# Patient Record
Sex: MALE | Race: WHITE | Employment: OTHER | URBAN - METROPOLITAN AREA
[De-identification: names, ages, dates, MRNs, and addresses within clinical notes are randomized per-mention and may not be internally consistent; named-entity substitution may affect disease eponyms.]

---

## 2021-12-17 ENCOUNTER — APPOINTMENT (OUTPATIENT)
Dept: GENERAL RADIOLOGY | Age: 46
DRG: 720 | End: 2021-12-17
Attending: EMERGENCY MEDICINE
Payer: MEDICAID

## 2021-12-17 ENCOUNTER — HOSPITAL ENCOUNTER (INPATIENT)
Age: 46
LOS: 24 days | Discharge: HOME HEALTH | DRG: 720 | End: 2022-01-10
Attending: STUDENT IN AN ORGANIZED HEALTH CARE EDUCATION/TRAINING PROGRAM | Admitting: HOSPITALIST
Payer: MEDICAID

## 2021-12-17 ENCOUNTER — APPOINTMENT (OUTPATIENT)
Dept: CT IMAGING | Age: 46
DRG: 720 | End: 2021-12-17
Attending: STUDENT IN AN ORGANIZED HEALTH CARE EDUCATION/TRAINING PROGRAM
Payer: MEDICAID

## 2021-12-17 DIAGNOSIS — R74.8 ELEVATED ALKALINE PHOSPHATASE LEVEL: ICD-10-CM

## 2021-12-17 DIAGNOSIS — J69.0 ASPIRATION PNEUMONIA, UNSPECIFIED ASPIRATION PNEUMONIA TYPE, UNSPECIFIED LATERALITY, UNSPECIFIED PART OF LUNG (HCC): ICD-10-CM

## 2021-12-17 DIAGNOSIS — A41.9 SEPSIS, DUE TO UNSPECIFIED ORGANISM, UNSPECIFIED WHETHER ACUTE ORGAN DYSFUNCTION PRESENT (HCC): Primary | ICD-10-CM

## 2021-12-17 DIAGNOSIS — M79.89 LEG SWELLING: ICD-10-CM

## 2021-12-17 DIAGNOSIS — R13.10 DYSPHAGIA, UNSPECIFIED TYPE: ICD-10-CM

## 2021-12-17 DIAGNOSIS — I82.412 DEEP VEIN THROMBOSIS (DVT) OF FEMORAL VEIN OF LEFT LOWER EXTREMITY, UNSPECIFIED CHRONICITY (HCC): ICD-10-CM

## 2021-12-17 DIAGNOSIS — J18.9 PNEUMONIA DUE TO INFECTIOUS ORGANISM, UNSPECIFIED LATERALITY, UNSPECIFIED PART OF LUNG: ICD-10-CM

## 2021-12-17 LAB
ALBUMIN SERPL-MCNC: 3.6 G/DL (ref 3.5–5)
ALBUMIN/GLOB SERPL: 0.8 {RATIO} (ref 1.1–2.2)
ALP SERPL-CCNC: 456 U/L (ref 45–117)
ALT SERPL-CCNC: 22 U/L (ref 12–78)
ANION GAP SERPL CALC-SCNC: 9 MMOL/L (ref 5–15)
APPEARANCE UR: CLEAR
AST SERPL W P-5'-P-CCNC: 28 U/L (ref 15–37)
ATRIAL RATE: 108 BPM
BACTERIA URNS QL MICRO: NEGATIVE /HPF
BASOPHILS # BLD: 0.1 K/UL (ref 0–0.1)
BASOPHILS NFR BLD: 1 % (ref 0–1)
BILIRUB SERPL-MCNC: 0.3 MG/DL (ref 0.2–1)
BILIRUB UR QL: NEGATIVE
BNP SERPL-MCNC: 31 PG/ML
BUN SERPL-MCNC: 11 MG/DL (ref 6–20)
BUN/CREAT SERPL: 13 (ref 12–20)
CA-I BLD-MCNC: 8.8 MG/DL (ref 8.5–10.1)
CALCULATED P AXIS, ECG09: 5 DEGREES
CALCULATED R AXIS, ECG10: 134 DEGREES
CALCULATED T AXIS, ECG11: 6 DEGREES
CHLORIDE SERPL-SCNC: 106 MMOL/L (ref 97–108)
CO2 SERPL-SCNC: 24 MMOL/L (ref 21–32)
COLOR UR: YELLOW
COVID-19 RAPID TEST, COVR: NOT DETECTED
CREAT SERPL-MCNC: 0.84 MG/DL (ref 0.7–1.3)
DIAGNOSIS, 93000: NORMAL
DIFFERENTIAL METHOD BLD: ABNORMAL
EOSINOPHIL # BLD: 0.2 K/UL (ref 0–0.4)
EOSINOPHIL NFR BLD: 2 % (ref 0–7)
ERYTHROCYTE [DISTWIDTH] IN BLOOD BY AUTOMATED COUNT: 13.7 % (ref 11.5–14.5)
GLOBULIN SER CALC-MCNC: 4.6 G/DL (ref 2–4)
GLUCOSE SERPL-MCNC: 104 MG/DL (ref 65–100)
GLUCOSE UR STRIP.AUTO-MCNC: NEGATIVE MG/DL
HCT VFR BLD AUTO: 39.7 % (ref 36.6–50.3)
HGB BLD-MCNC: 12.7 G/DL (ref 12.1–17)
HGB UR QL STRIP: NEGATIVE
IMM GRANULOCYTES # BLD AUTO: 0 K/UL (ref 0–0.04)
IMM GRANULOCYTES NFR BLD AUTO: 0 % (ref 0–0.5)
KETONES UR QL STRIP.AUTO: 80 MG/DL
LACTATE SERPL-SCNC: 1.8 MMOL/L (ref 0.4–2)
LEUKOCYTE ESTERASE UR QL STRIP.AUTO: NEGATIVE
LIPASE SERPL-CCNC: 138 U/L (ref 73–393)
LYMPHOCYTES # BLD: 0.9 K/UL (ref 0.8–3.5)
LYMPHOCYTES NFR BLD: 9 % (ref 12–49)
MCH RBC QN AUTO: 30.4 PG (ref 26–34)
MCHC RBC AUTO-ENTMCNC: 32 G/DL (ref 30–36.5)
MCV RBC AUTO: 95 FL (ref 80–99)
MONOCYTES # BLD: 0.8 K/UL (ref 0–1)
MONOCYTES NFR BLD: 7 % (ref 5–13)
MUCOUS THREADS URNS QL MICRO: ABNORMAL /LPF
NEUTS SEG # BLD: 8.3 K/UL (ref 1.8–8)
NEUTS SEG NFR BLD: 81 % (ref 32–75)
NITRITE UR QL STRIP.AUTO: NEGATIVE
NRBC # BLD: 0 K/UL (ref 0–0.01)
NRBC BLD-RTO: 0 PER 100 WBC
P-R INTERVAL, ECG05: 142 MS
PH UR STRIP: 7 [PH] (ref 5–8)
PLATELET # BLD AUTO: 351 K/UL (ref 150–400)
PMV BLD AUTO: 9.8 FL (ref 8.9–12.9)
POTASSIUM SERPL-SCNC: 3.9 MMOL/L (ref 3.5–5.1)
PROCALCITONIN SERPL-MCNC: <0.05 NG/ML
PROT SERPL-MCNC: 8.2 G/DL (ref 6.4–8.2)
PROT UR STRIP-MCNC: 100 MG/DL
Q-T INTERVAL, ECG07: 324 MS
QRS DURATION, ECG06: 88 MS
QTC CALCULATION (BEZET), ECG08: 434 MS
RBC # BLD AUTO: 4.18 M/UL (ref 4.1–5.7)
RBC #/AREA URNS HPF: ABNORMAL /HPF (ref 0–5)
SARS-COV-2, COV2: NORMAL
SODIUM SERPL-SCNC: 139 MMOL/L (ref 136–145)
SP GR UR REFRACTOMETRY: >1.03 (ref 1–1.03)
SPECIMEN SOURCE: NORMAL
TROPONIN-HIGH SENSITIVITY: 7 NG/L (ref 0–76)
UA: UC IF INDICATED,UAUC: ABNORMAL
UROBILINOGEN UR QL STRIP.AUTO: 4 EU/DL (ref 0.1–1)
VENTRICULAR RATE, ECG03: 108 BPM
WBC # BLD AUTO: 10.3 K/UL (ref 4.1–11.1)
WBC URNS QL MICRO: ABNORMAL /HPF (ref 0–4)

## 2021-12-17 PROCEDURE — 71045 X-RAY EXAM CHEST 1 VIEW: CPT

## 2021-12-17 PROCEDURE — 85025 COMPLETE CBC W/AUTO DIFF WBC: CPT

## 2021-12-17 PROCEDURE — 96375 TX/PRO/DX INJ NEW DRUG ADDON: CPT

## 2021-12-17 PROCEDURE — 65270000029 HC RM PRIVATE

## 2021-12-17 PROCEDURE — 74177 CT ABD & PELVIS W/CONTRAST: CPT

## 2021-12-17 PROCEDURE — 84484 ASSAY OF TROPONIN QUANT: CPT

## 2021-12-17 PROCEDURE — 74011000258 HC RX REV CODE- 258: Performed by: HOSPITALIST

## 2021-12-17 PROCEDURE — 74011250636 HC RX REV CODE- 250/636: Performed by: STUDENT IN AN ORGANIZED HEALTH CARE EDUCATION/TRAINING PROGRAM

## 2021-12-17 PROCEDURE — 99285 EMERGENCY DEPT VISIT HI MDM: CPT

## 2021-12-17 PROCEDURE — 96374 THER/PROPH/DIAG INJ IV PUSH: CPT

## 2021-12-17 PROCEDURE — 93005 ELECTROCARDIOGRAM TRACING: CPT

## 2021-12-17 PROCEDURE — 94761 N-INVAS EAR/PLS OXIMETRY MLT: CPT

## 2021-12-17 PROCEDURE — 74011000250 HC RX REV CODE- 250: Performed by: STUDENT IN AN ORGANIZED HEALTH CARE EDUCATION/TRAINING PROGRAM

## 2021-12-17 PROCEDURE — 81001 URINALYSIS AUTO W/SCOPE: CPT

## 2021-12-17 PROCEDURE — 84145 PROCALCITONIN (PCT): CPT

## 2021-12-17 PROCEDURE — 83880 ASSAY OF NATRIURETIC PEPTIDE: CPT

## 2021-12-17 PROCEDURE — 74018 RADEX ABDOMEN 1 VIEW: CPT

## 2021-12-17 PROCEDURE — 74011250636 HC RX REV CODE- 250/636: Performed by: EMERGENCY MEDICINE

## 2021-12-17 PROCEDURE — 83605 ASSAY OF LACTIC ACID: CPT

## 2021-12-17 PROCEDURE — 83690 ASSAY OF LIPASE: CPT

## 2021-12-17 PROCEDURE — 87635 SARS-COV-2 COVID-19 AMP PRB: CPT

## 2021-12-17 PROCEDURE — 36415 COLL VENOUS BLD VENIPUNCTURE: CPT

## 2021-12-17 PROCEDURE — 74011000636 HC RX REV CODE- 636: Performed by: STUDENT IN AN ORGANIZED HEALTH CARE EDUCATION/TRAINING PROGRAM

## 2021-12-17 PROCEDURE — 80053 COMPREHEN METABOLIC PANEL: CPT

## 2021-12-17 PROCEDURE — 74011250636 HC RX REV CODE- 250/636: Performed by: HOSPITALIST

## 2021-12-17 RX ORDER — PSYLLIUM HUSK 3.4 G
1 POWDER IN PACKET (EA) ORAL DAILY
COMMUNITY

## 2021-12-17 RX ORDER — ACETAMINOPHEN 325 MG/1
650 TABLET ORAL
Status: DISCONTINUED | OUTPATIENT
Start: 2021-12-17 | End: 2022-01-10 | Stop reason: HOSPADM

## 2021-12-17 RX ORDER — KETOTIFEN FUMARATE 0.35 MG/ML
1 SOLUTION/ DROPS OPHTHALMIC 2 TIMES DAILY
Status: DISCONTINUED | OUTPATIENT
Start: 2021-12-18 | End: 2022-01-10 | Stop reason: HOSPADM

## 2021-12-17 RX ORDER — FOLIC ACID 1 MG/1
1 TABLET ORAL DAILY
COMMUNITY

## 2021-12-17 RX ORDER — SODIUM CHLORIDE 0.9 % (FLUSH) 0.9 %
5-40 SYRINGE (ML) INJECTION EVERY 8 HOURS
Status: DISCONTINUED | OUTPATIENT
Start: 2021-12-17 | End: 2021-12-25

## 2021-12-17 RX ORDER — ENOXAPARIN SODIUM 100 MG/ML
40 INJECTION SUBCUTANEOUS EVERY 24 HOURS
Status: DISCONTINUED | OUTPATIENT
Start: 2021-12-17 | End: 2021-12-20

## 2021-12-17 RX ORDER — LACTULOSE 10 G/15ML
10 SOLUTION ORAL; RECTAL DAILY
COMMUNITY

## 2021-12-17 RX ORDER — SODIUM CHLORIDE 9 MG/ML
1000 INJECTION, SOLUTION INTRAVENOUS CONTINUOUS
Status: DISCONTINUED | OUTPATIENT
Start: 2021-12-17 | End: 2021-12-29

## 2021-12-17 RX ORDER — FOLIC ACID 1 MG/1
1 TABLET ORAL DAILY
Status: DISCONTINUED | OUTPATIENT
Start: 2021-12-18 | End: 2022-01-10 | Stop reason: HOSPADM

## 2021-12-17 RX ORDER — ALBUTEROL SULFATE 90 UG/1
2 AEROSOL, METERED RESPIRATORY (INHALATION)
Status: DISCONTINUED | OUTPATIENT
Start: 2021-12-17 | End: 2022-01-10 | Stop reason: HOSPADM

## 2021-12-17 RX ORDER — PHENYTOIN SODIUM 100 MG/1
230 CAPSULE, EXTENDED RELEASE ORAL DAILY
COMMUNITY

## 2021-12-17 RX ORDER — CETIRIZINE HCL 10 MG
10 TABLET ORAL DAILY
Status: DISCONTINUED | OUTPATIENT
Start: 2021-12-18 | End: 2022-01-10 | Stop reason: HOSPADM

## 2021-12-17 RX ORDER — LEVETIRACETAM 500 MG/1
1000 TABLET ORAL 2 TIMES DAILY
Status: DISCONTINUED | OUTPATIENT
Start: 2021-12-17 | End: 2021-12-18

## 2021-12-17 RX ORDER — LAMOTRIGINE 100 MG/1
300 TABLET ORAL 2 TIMES DAILY
Status: DISCONTINUED | OUTPATIENT
Start: 2021-12-18 | End: 2022-01-10 | Stop reason: HOSPADM

## 2021-12-17 RX ORDER — LEVETIRACETAM 1000 MG/1
1000 TABLET ORAL 2 TIMES DAILY
COMMUNITY

## 2021-12-17 RX ORDER — LAMOTRIGINE 150 MG/1
300 TABLET ORAL 2 TIMES DAILY
COMMUNITY

## 2021-12-17 RX ORDER — SODIUM CHLORIDE 9 MG/ML
125 INJECTION, SOLUTION INTRAVENOUS CONTINUOUS
Status: DISPENSED | OUTPATIENT
Start: 2021-12-17 | End: 2021-12-18

## 2021-12-17 RX ORDER — KETOTIFEN FUMARATE 0.35 MG/ML
1 SOLUTION/ DROPS OPHTHALMIC 2 TIMES DAILY
COMMUNITY

## 2021-12-17 RX ORDER — LORAZEPAM 2 MG/ML
1 INJECTION INTRAMUSCULAR
Status: COMPLETED | OUTPATIENT
Start: 2021-12-17 | End: 2021-12-17

## 2021-12-17 RX ORDER — CETIRIZINE HCL 10 MG
10 TABLET ORAL DAILY
COMMUNITY

## 2021-12-17 RX ORDER — SODIUM CHLORIDE 0.9 % (FLUSH) 0.9 %
5-40 SYRINGE (ML) INJECTION AS NEEDED
Status: DISCONTINUED | OUTPATIENT
Start: 2021-12-17 | End: 2022-01-01

## 2021-12-17 RX ORDER — ONDANSETRON 2 MG/ML
4 INJECTION INTRAMUSCULAR; INTRAVENOUS ONCE
Status: COMPLETED | OUTPATIENT
Start: 2021-12-17 | End: 2021-12-17

## 2021-12-17 RX ADMIN — SODIUM CHLORIDE 1000 ML: 9 INJECTION, SOLUTION INTRAVENOUS at 19:52

## 2021-12-17 RX ADMIN — PIPERACILLIN AND TAZOBACTAM 3.38 G: 3; .375 INJECTION, POWDER, LYOPHILIZED, FOR SOLUTION INTRAVENOUS at 21:32

## 2021-12-17 RX ADMIN — CEFTRIAXONE SODIUM 1 G: 1 INJECTION, POWDER, FOR SOLUTION INTRAMUSCULAR; INTRAVENOUS at 19:35

## 2021-12-17 RX ADMIN — SODIUM CHLORIDE 125 ML/HR: 9 INJECTION, SOLUTION INTRAVENOUS at 21:32

## 2021-12-17 RX ADMIN — IOPAMIDOL 100 ML: 755 INJECTION, SOLUTION INTRAVENOUS at 17:11

## 2021-12-17 RX ADMIN — Medication 10 ML: at 21:54

## 2021-12-17 RX ADMIN — ONDANSETRON 4 MG: 2 INJECTION INTRAMUSCULAR; INTRAVENOUS at 11:05

## 2021-12-17 RX ADMIN — SODIUM CHLORIDE 1000 ML: 9 INJECTION, SOLUTION INTRAVENOUS at 11:06

## 2021-12-17 RX ADMIN — ENOXAPARIN SODIUM 40 MG: 100 INJECTION SUBCUTANEOUS at 21:35

## 2021-12-17 RX ADMIN — LORAZEPAM 1 MG: 2 INJECTION INTRAMUSCULAR; INTRAVENOUS at 16:07

## 2021-12-17 RX ADMIN — AZITHROMYCIN MONOHYDRATE 500 MG: 500 INJECTION, POWDER, LYOPHILIZED, FOR SOLUTION INTRAVENOUS at 19:51

## 2021-12-17 RX ADMIN — SODIUM CHLORIDE 1000 ML: 9 INJECTION, SOLUTION INTRAVENOUS at 19:53

## 2021-12-17 NOTE — ED NOTES
Pt's  Maisha Davis [phone]] and care giver William Vogel [phone]] are his primary care givers. Cesilia Troncoso brought pt's VPS. Writer taped it to the head of bed, in a bag, to keep safe.

## 2021-12-17 NOTE — ED TRIAGE NOTES
Not eating and drinking x 4 days, also no bm x 4 days, per ems pt is \"basically at baseline\" but usually able to speak some, contracted

## 2021-12-17 NOTE — ED PROVIDER NOTES
EMERGENCY DEPARTMENT HISTORY AND PHYSICAL EXAM      Date: 12/17/2021  Patient Name: Jalil Rios      History of Presenting Illness     Chief Complaint   Patient presents with    Lethargy       History Provided By: Caregiver    HPI: Jalil Rios, 55 y.o. male with PMH of seizure disorder who comes from a group home with concerns for vomiting. Patient was transferred recently to this group home and they report over the last 4 days he has not been eating and drinking. He has not been having bowel movement this morning which he had a bowel movement after getting an enema. Reportedly per staff, patient vomited several times. Per staff, baseline function and is a few words and may follow simple commands. Has some contractures of extremities distally at baseline. There are no other complaints, changes, or physical findings at this time. PCP: Unknown, Provider, MD        Past History     Past Medical History:  Seizure disorder    Past Surgical History:  None    Family History:  No family history on file. Social History:  Social History     Tobacco Use    Smoking status: Not on file    Smokeless tobacco: Not on file   Substance Use Topics    Alcohol use: Not on file    Drug use: Not on file       Allergies:  No Known Allergies      Review of Systems     Review of Systems   Unable to perform ROS: Psychiatric disorder       Physical Exam     Physical Exam  Vitals and nursing note reviewed. Constitutional:       General: He is not in acute distress. Appearance: He is not toxic-appearing. HENT:      Head: Normocephalic and atraumatic. Nose: No congestion or rhinorrhea. Mouth/Throat:      Mouth: Mucous membranes are dry. Pharynx: Oropharynx is clear. Eyes:      Extraocular Movements: Extraocular movements intact. Conjunctiva/sclera: Conjunctivae normal.   Cardiovascular:      Rate and Rhythm: Regular rhythm. Tachycardia present.    Pulmonary:      Effort: Pulmonary effort is normal.      Breath sounds: Rhonchi present. Abdominal:      Palpations: Abdomen is soft. Tenderness: There is no abdominal tenderness. Skin:     General: Skin is warm and dry. Neurological:      Mental Status: He is alert. Mental status is at baseline. Lab and Diagnostic Study Results     Labs -     Recent Results (from the past 12 hour(s))   EKG, 12 LEAD, INITIAL    Collection Time: 12/17/21 10:55 AM   Result Value Ref Range    Ventricular Rate 108 BPM    Atrial Rate 108 BPM    P-R Interval 142 ms    QRS Duration 88 ms    Q-T Interval 324 ms    QTC Calculation (Bezet) 434 ms    Calculated P Axis 5 degrees    Calculated R Axis 134 degrees    Calculated T Axis 6 degrees    Diagnosis       Sinus tachycardia  Right axis deviation  Nonspecific anterior T wave changes  Abnormal ECG  No previous ECGs available  Confirmed by Sujatha Farley MD, Kathy Gaines ([id number]) on 12/17/2021 12:20:34 PM     CBC WITH AUTOMATED DIFF    Collection Time: 12/17/21 10:58 AM   Result Value Ref Range    WBC 10.3 4.1 - 11.1 K/uL    RBC 4.18 4.10 - 5.70 M/uL    HGB 12.7 12.1 - 17.0 g/dL    HCT 39.7 36.6 - 50.3 %    MCV 95.0 80.0 - 99.0 FL    MCH 30.4 26.0 - 34.0 PG    MCHC 32.0 30.0 - 36.5 g/dL    RDW 13.7 11.5 - 14.5 %    PLATELET 357 985 - 221 K/uL    MPV 9.8 8.9 - 12.9 FL    NRBC 0.0 0.0  WBC    ABSOLUTE NRBC 0.00 0.00 - 0.01 K/uL    NEUTROPHILS 81 (H) 32 - 75 %    LYMPHOCYTES 9 (L) 12 - 49 %    MONOCYTES 7 5 - 13 %    EOSINOPHILS 2 0 - 7 %    BASOPHILS 1 0 - 1 %    IMMATURE GRANULOCYTES 0 0 - 0.5 %    ABS. NEUTROPHILS 8.3 (H) 1.8 - 8.0 K/UL    ABS. LYMPHOCYTES 0.9 0.8 - 3.5 K/UL    ABS. MONOCYTES 0.8 0.0 - 1.0 K/UL    ABS. EOSINOPHILS 0.2 0.0 - 0.4 K/UL    ABS. BASOPHILS 0.1 0.0 - 0.1 K/UL    ABS. IMM.  GRANS. 0.0 0.00 - 0.04 K/UL    DF AUTOMATED     LACTIC ACID    Collection Time: 12/17/21 10:58 AM   Result Value Ref Range    Lactic acid 1.8 0.4 - 2.0 mmol/L   PROCALCITONIN    Collection Time: 12/17/21 11:30 AM   Result Value Ref Range    Procalcitonin <0.05 (H) 0 ng/mL   TROPONIN-HIGH SENSITIVITY    Collection Time: 12/17/21 11:30 AM   Result Value Ref Range    Troponin-High Sensitivity 7 0 - 76 ng/L   LIPASE    Collection Time: 12/17/21 12:10 PM   Result Value Ref Range    Lipase 138 73 - 105 U/L   METABOLIC PANEL, COMPREHENSIVE    Collection Time: 12/17/21 12:10 PM   Result Value Ref Range    Sodium 139 136 - 145 mmol/L    Potassium 3.9 3.5 - 5.1 mmol/L    Chloride 106 97 - 108 mmol/L    CO2 24 21 - 32 mmol/L    Anion gap 9 5 - 15 mmol/L    Glucose 104 (H) 65 - 100 mg/dL    BUN 11 6 - 20 mg/dL    Creatinine 0.84 0.70 - 1.30 mg/dL    BUN/Creatinine ratio 13 12 - 20      GFR est AA >60 >60 ml/min/1.73m2    GFR est non-AA >60 >60 ml/min/1.73m2    Calcium 8.8 8.5 - 10.1 mg/dL    Bilirubin, total 0.3 0.2 - 1.0 mg/dL    AST (SGOT) 28 15 - 37 U/L    ALT (SGPT) 22 12 - 78 U/L    Alk.  phosphatase 456 (H) 45 - 117 U/L    Protein, total 8.2 6.4 - 8.2 g/dL    Albumin 3.6 3.5 - 5.0 g/dL    Globulin 4.6 (H) 2.0 - 4.0 g/dL    A-G Ratio 0.8 (L) 1.1 - 2.2     NT-PRO BNP    Collection Time: 12/17/21 12:10 PM   Result Value Ref Range    NT pro-BNP 31 <125 pg/mL   URINALYSIS W/ REFLEX CULTURE    Collection Time: 12/17/21  3:18 PM    Specimen: Urine   Result Value Ref Range    Color Yellow      Appearance Clear Clear      Specific gravity >1.030 (H) 1.003 - 1.030    pH (UA) 7.0 5.0 - 8.0      Protein 100 (A) Negative mg/dL    Glucose Negative Negative mg/dL    Ketone 80 (A) Negative mg/dL    Bilirubin Negative Negative      Blood Negative Negative      Urobilinogen 4.0 (H) 0.1 - 1.0 EU/dL    Nitrites Negative Negative      Leukocyte Esterase Negative Negative      UA:UC IF INDICATED Culture not indicated by UA result Culture not indicated by UA result      WBC 0-4 0 - 4 /hpf    RBC 0-5 0 - 5 /hpf    Bacteria Negative Negative /hpf    Mucus Trace /lpf   SARS-COV-2    Collection Time: 12/17/21  4:20 PM   Result Value Ref Range    SARS-CoV-2 Please find results under separate order     COVID-19 RAPID TEST    Collection Time: 12/17/21  4:20 PM   Result Value Ref Range    Specimen source Nasopharyngeal      COVID-19 rapid test Not Detected Not Detected         Radiologic Studies -   [unfilled]  CT Results  (Last 48 hours)               12/17/21 1722  CT ABD PELV W CONT Final result    Impression:      1. Small focus of intraluminal gas within the urinary bladder. Please correlate   for recent instrumentation or other causes of intraluminal gas, including an   infectious process. 2. Somewhat reticular nodular opacities in the lung bases, suboptimally   evaluated due to respiratory motion. Some of these have what appears to be a   tree and bud morphology, suggesting either chronic aspiration or an infectious   or inflammatory small airways disease. Otherwise a fibrotic process should be   considered. This would be better characterized with dedicated chest CT as   clinically warranted. 3. Underdistention versus circumferential wall thickening of the distal   esophagus. 4. Probable left trochanteric bursitis with sterility of the fluid   indeterminate. 5. Probable myositis ossificans surrounding the left hip. Please correlate for   history of prior trauma. 6. Enlarged lymph nodes in the upper abdomen, nonspecific. Comparison with   outside imaging would be helpful, if available, to determine longer term   stability. Otherwise short interval follow-up in 3 months is recommended. Narrative:  Exam: CT ABD PELV W CONT       TECHNIQUE: Multiple transaxial CT images of the abdomen and pelvis were obtained   following the uneventful administration of 100 mL Isovue 370 intravenous   contrast. Coronal and sagittal reformatted images were provided.        Dose reduction: All CT scans at this facility are performed using dose reduction   optimization techniques as appropriate to a performed exam including the   following: Automated exposure control, adjustments of the mA and/or kV according   to patient size, or use of iterative reconstruction technique. COMPARISON: Chest and abdominal radiographs performed the same date       HISTORY: Abdominal pain       FINDINGS:       Lower thorax: The lung bases are somewhat suboptimally evaluated due to the   degree of respiratory motion. Within exam limitations there are reticular and   somewhat nodular opacities within the lung bases, some of which could have a   tree-in-bud morphology. Under distention versus wall thickening of the distal   esophagus. Abdomen and pelvis:   Liver: Subcentimeter well-circumscribed hypodensities are too small to further   characterize. Gallbladder: Unremarkable. Biliary system: Nondilated. Pancreas: Unremarkable. Spleen: Unremarkable. Adrenal glands: Normal.   Kidneys and ureters: The kidneys enhance symmetrically. There is a   well-circumscribed subcentimeter hypodensity in the lower pole of the left   kidney that is too small to further characterize. No hydronephrosis or   hydroureter. Urinary bladder: Decompressed, limiting further evaluation. There is a small   focus of intraluminal gas within the urinary bladder. Please correlate for   recent instrumentation or potentially infectious process. Reproductive organs: Unremarkable. Bowel: No findings of bowel obstruction. The appendix appears normal.       Peritoneum: No pneumoperitoneum. No free fluid. Lymph nodes: Prominent nonspecific lymph nodes adjacent to the esophageal hiatus   in the upper abdomen measuring up to 11 mm (axial series 201, image 47, for   example). Aorta and other vessels: The aorta is within normal limits. The systemic and   portal venous systems are opacified. Proximal splanchnic vessels appear patent. Bones: No acute or aggressive osseous abnormalities are evident. Superficial soft tissues:  There is a fluid collection lateral to the left hip   adjacent to the greater trochanter measuring up to approximately 4.7 x 1.6 x 9.5   cm extending cephalad into the gluteus musculature. It has peripheral   hyperdensity and could represent trochanteric bursitis with sterility   indeterminate. This is either a bilobed communicating fluid collection or 2   adjacent small fluid collections. Otherwise there is what appears to be myositis   ossificans surrounding the left hip. CXR Results  (Last 48 hours)               12/17/21 1150  XR CHEST SNGL V Final result    Impression:  Patchy opacities within the lungs, nonspecific, but leading differential   considerations include infection or pulmonary edema. Superimposed acute disease   on an underlying chronic interstitial process is also possible. Radiographic   follow-up is recommended to exclude other causes of opacification. Narrative:  Examination: XR CHEST SNGL V        History: weakness       Comparison: None available. FINDINGS:       Single frontal portable view of the chest. Somewhat suboptimal evaluation   related to patient positioning and portable technique. Lung volumes are   symmetric. Hazy and reticular opacities in the lungs, most pronounced in the   left mid and lower lung as well as the right lower lung. No radiographically   evident pneumothorax. Small layering effusion on the left could contribute to   the above described opacification. The cardiac silhouette is within normal   limits for technique. Generator pack overlies and obscures portions of the left   chest. No acute osseous abnormalities are evident. Please note that portions of   the lung apices are obscured by overlying structures. Gaseous distention of   bowel in the upper abdomen is better evaluated on the concurrently performed   abdominal radiograph. Medical Decision Making and ED Course   - I am the first and primary provider for this patient AND AM THE PRIMARY PROVIDER OF RECORD.     - I reviewed the vital signs, available nursing notes, past medical history, past surgical history, family history and social history. - Initial assessment performed. The patients presenting problems have been discussed, and the staff are in agreement with the care plan formulated and outlined with them. I have encouraged them to ask questions as they arise throughout their visit. Vital Signs-Reviewed the patient's vital signs. Patient Vitals for the past 24 hrs:   Temp Pulse Resp BP SpO2   12/17/21 1927  (!) 112 20 111/75 97 %   12/17/21 1846 100.2 °F (37.9 °C) (!) 114 25     12/17/21 1614 (!) 103.1 °F (39.5 °C)       12/17/21 1106     94 %   12/17/21 1043  (!) 105 16 103/68        Records Reviewed: Nursing Notes    Provider Notes (Medical Decision Making):     Patient is presenting from group home with decreased activity, decreased eating and couple of episodes of vomiting. In the ED, patient was noted to be tachycardic. His exam was showed mild rhonchi while possible transmitted upper airway sounds. Not be tachycardic and dry. Thus, it appears rehydrated and water. Did CBC, chemistry, urinalysis, and x-ray. Showed some concerns for pneumonia on x-ray. Given his vomiting, I did obtain CT which did not show an acute finding. Patient spiked a fever in the ED and became more tachycardic after IV fluids. Thus, will consider sepsis secondary to pneumonia patient will need to be admitted to the hospital for treatment of sepsis. Disposition     Disposition: Condition stable  Admitted to Floor Medical Floor the case was discussed with the admitting physician Dr. Zuluaga Falling    Admitted    Diagnosis     Clinical Impression:   1. Sepsis, due to unspecified organism, unspecified whether acute organ dysfunction present ([facility] [address]    2. Pneumonia due to infectious organism, unspecified laterality, unspecified part of lung        Attestations:     Jaya Rogel MD    Please note that this dictation was completed with [employer] computer voice recognition software. Quite often unanticipated grammatical, syntax, homophones, and other interpretive errors are inadvertently transcribed by the computer software. Please disregard these errors. Please excuse any errors that have escaped final proofreading. Thank you.

## 2021-12-18 LAB
ALBUMIN SERPL-MCNC: 2.6 G/DL (ref 3.5–5)
ALBUMIN/GLOB SERPL: 0.7 {RATIO} (ref 1.1–2.2)
ALP SERPL-CCNC: 306 U/L (ref 45–117)
ALT SERPL-CCNC: 18 U/L (ref 12–78)
ANION GAP SERPL CALC-SCNC: 6 MMOL/L (ref 5–15)
AST SERPL W P-5'-P-CCNC: 29 U/L (ref 15–37)
BASOPHILS # BLD: 0.1 K/UL (ref 0–0.1)
BASOPHILS NFR BLD: 1 % (ref 0–1)
BILIRUB SERPL-MCNC: 0.3 MG/DL (ref 0.2–1)
BUN SERPL-MCNC: 11 MG/DL (ref 6–20)
BUN/CREAT SERPL: 13 (ref 12–20)
CA-I BLD-MCNC: 7.9 MG/DL (ref 8.5–10.1)
CHLORIDE SERPL-SCNC: 109 MMOL/L (ref 97–108)
CO2 SERPL-SCNC: 25 MMOL/L (ref 21–32)
CREAT SERPL-MCNC: 0.83 MG/DL (ref 0.7–1.3)
DIFFERENTIAL METHOD BLD: ABNORMAL
EOSINOPHIL # BLD: 0.1 K/UL (ref 0–0.4)
EOSINOPHIL NFR BLD: 1 % (ref 0–7)
ERYTHROCYTE [DISTWIDTH] IN BLOOD BY AUTOMATED COUNT: 13.2 % (ref 11.5–14.5)
GLOBULIN SER CALC-MCNC: 3.6 G/DL (ref 2–4)
GLUCOSE BLD STRIP.AUTO-MCNC: 124 MG/DL (ref 65–117)
GLUCOSE SERPL-MCNC: 119 MG/DL (ref 65–100)
HCT VFR BLD AUTO: 33.8 % (ref 36.6–50.3)
HGB BLD-MCNC: 10.3 G/DL (ref 12.1–17)
IMM GRANULOCYTES # BLD AUTO: 0 K/UL (ref 0–0.04)
IMM GRANULOCYTES NFR BLD AUTO: 0 % (ref 0–0.5)
LYMPHOCYTES # BLD: 1.2 K/UL (ref 0.8–3.5)
LYMPHOCYTES NFR BLD: 11 % (ref 12–49)
MCH RBC QN AUTO: 30.2 PG (ref 26–34)
MCHC RBC AUTO-ENTMCNC: 30.5 G/DL (ref 30–36.5)
MCV RBC AUTO: 99.1 FL (ref 80–99)
MONOCYTES # BLD: 1.2 K/UL (ref 0–1)
MONOCYTES NFR BLD: 10 % (ref 5–13)
NEUTS SEG # BLD: 8.9 K/UL (ref 1.8–8)
NEUTS SEG NFR BLD: 77 % (ref 32–75)
NRBC # BLD: 0 K/UL (ref 0–0.01)
NRBC BLD-RTO: 0 PER 100 WBC
PERFORMED BY, TECHID: ABNORMAL
PLATELET # BLD AUTO: 265 K/UL (ref 150–400)
PMV BLD AUTO: 9.8 FL (ref 8.9–12.9)
POTASSIUM SERPL-SCNC: 3.5 MMOL/L (ref 3.5–5.1)
PROT SERPL-MCNC: 6.2 G/DL (ref 6.4–8.2)
RBC # BLD AUTO: 3.41 M/UL (ref 4.1–5.7)
SODIUM SERPL-SCNC: 140 MMOL/L (ref 136–145)
WBC # BLD AUTO: 11.5 K/UL (ref 4.1–11.1)

## 2021-12-18 PROCEDURE — 74011250637 HC RX REV CODE- 250/637: Performed by: HOSPITALIST

## 2021-12-18 PROCEDURE — 74011000250 HC RX REV CODE- 250: Performed by: HOSPITALIST

## 2021-12-18 PROCEDURE — 36415 COLL VENOUS BLD VENIPUNCTURE: CPT

## 2021-12-18 PROCEDURE — 74011250636 HC RX REV CODE- 250/636: Performed by: HOSPITALIST

## 2021-12-18 PROCEDURE — 74011250636 HC RX REV CODE- 250/636: Performed by: INTERNAL MEDICINE

## 2021-12-18 PROCEDURE — 77010033678 HC OXYGEN DAILY

## 2021-12-18 PROCEDURE — 80053 COMPREHEN METABOLIC PANEL: CPT

## 2021-12-18 PROCEDURE — 92526 ORAL FUNCTION THERAPY: CPT

## 2021-12-18 PROCEDURE — 99222 1ST HOSP IP/OBS MODERATE 55: CPT | Performed by: INTERNAL MEDICINE

## 2021-12-18 PROCEDURE — 82962 GLUCOSE BLOOD TEST: CPT

## 2021-12-18 PROCEDURE — 92610 EVALUATE SWALLOWING FUNCTION: CPT

## 2021-12-18 PROCEDURE — 65610000006 HC RM INTENSIVE CARE

## 2021-12-18 PROCEDURE — 74011000258 HC RX REV CODE- 258: Performed by: HOSPITALIST

## 2021-12-18 PROCEDURE — 87040 BLOOD CULTURE FOR BACTERIA: CPT

## 2021-12-18 PROCEDURE — 85025 COMPLETE CBC W/AUTO DIFF WBC: CPT

## 2021-12-18 RX ORDER — ONDANSETRON 2 MG/ML
4 INJECTION INTRAMUSCULAR; INTRAVENOUS
Status: DISCONTINUED | OUTPATIENT
Start: 2021-12-18 | End: 2022-01-10 | Stop reason: HOSPADM

## 2021-12-18 RX ORDER — PHENYTOIN 125 MG/5ML
100 SUSPENSION ORAL DAILY
Status: DISCONTINUED | OUTPATIENT
Start: 2021-12-18 | End: 2022-01-02

## 2021-12-18 RX ORDER — LEVETIRACETAM 10 MG/ML
1000 INJECTION INTRAVASCULAR 2 TIMES DAILY
Status: DISCONTINUED | OUTPATIENT
Start: 2021-12-18 | End: 2022-01-07

## 2021-12-18 RX ORDER — ACETAMINOPHEN 650 MG/1
650 SUPPOSITORY RECTAL
Status: DISCONTINUED | OUTPATIENT
Start: 2021-12-18 | End: 2022-01-10 | Stop reason: HOSPADM

## 2021-12-18 RX ORDER — SODIUM CHLORIDE, SODIUM LACTATE, POTASSIUM CHLORIDE, CALCIUM CHLORIDE 600; 310; 30; 20 MG/100ML; MG/100ML; MG/100ML; MG/100ML
125 INJECTION, SOLUTION INTRAVENOUS CONTINUOUS
Status: DISPENSED | OUTPATIENT
Start: 2021-12-18 | End: 2021-12-18

## 2021-12-18 RX ORDER — PHENYTOIN 125 MG/5ML
130 SUSPENSION ORAL
Status: DISCONTINUED | OUTPATIENT
Start: 2021-12-18 | End: 2022-01-10 | Stop reason: HOSPADM

## 2021-12-18 RX ORDER — NOREPINEPHRINE BITARTRATE/D5W 8 MG/250ML
.5-16 PLASTIC BAG, INJECTION (ML) INTRAVENOUS
Status: DISCONTINUED | OUTPATIENT
Start: 2021-12-18 | End: 2021-12-22

## 2021-12-18 RX ADMIN — PHENYTOIN 130 MG: 125 SUSPENSION ORAL at 21:18

## 2021-12-18 RX ADMIN — FOLIC ACID 1 MG: 1 TABLET ORAL at 08:35

## 2021-12-18 RX ADMIN — Medication 10 ML: at 22:00

## 2021-12-18 RX ADMIN — CETIRIZINE HYDROCHLORIDE 10 MG: 10 TABLET, FILM COATED ORAL at 08:57

## 2021-12-18 RX ADMIN — KETOTIFEN FUMARATE 1 DROP: 0.35 SOLUTION/ DROPS OPHTHALMIC at 10:58

## 2021-12-18 RX ADMIN — PIPERACILLIN AND TAZOBACTAM 3.38 G: 3; .375 INJECTION, POWDER, LYOPHILIZED, FOR SOLUTION INTRAVENOUS at 13:18

## 2021-12-18 RX ADMIN — PIPERACILLIN AND TAZOBACTAM 3.38 G: 3; .375 INJECTION, POWDER, LYOPHILIZED, FOR SOLUTION INTRAVENOUS at 21:14

## 2021-12-18 RX ADMIN — ONDANSETRON 4 MG: 2 INJECTION INTRAMUSCULAR; INTRAVENOUS at 01:59

## 2021-12-18 RX ADMIN — SODIUM CHLORIDE, POTASSIUM CHLORIDE, SODIUM LACTATE AND CALCIUM CHLORIDE 125 ML/HR: 600; 310; 30; 20 INJECTION, SOLUTION INTRAVENOUS at 02:07

## 2021-12-18 RX ADMIN — SODIUM CHLORIDE, POTASSIUM CHLORIDE, SODIUM LACTATE AND CALCIUM CHLORIDE 125 ML/HR: 600; 310; 30; 20 INJECTION, SOLUTION INTRAVENOUS at 06:26

## 2021-12-18 RX ADMIN — LAMOTRIGINE 300 MG: 100 TABLET ORAL at 08:35

## 2021-12-18 RX ADMIN — VANCOMYCIN HYDROCHLORIDE 1000 MG: 1 INJECTION, POWDER, LYOPHILIZED, FOR SOLUTION INTRAVENOUS at 23:44

## 2021-12-18 RX ADMIN — ACETAMINOPHEN 650 MG: 650 SUPPOSITORY RECTAL at 01:59

## 2021-12-18 RX ADMIN — ACETAMINOPHEN 650 MG: 650 SUPPOSITORY RECTAL at 15:45

## 2021-12-18 RX ADMIN — ENOXAPARIN SODIUM 40 MG: 100 INJECTION SUBCUTANEOUS at 21:30

## 2021-12-18 RX ADMIN — VANCOMYCIN HYDROCHLORIDE 1000 MG: 1 INJECTION, POWDER, LYOPHILIZED, FOR SOLUTION INTRAVENOUS at 11:09

## 2021-12-18 RX ADMIN — PHENYTOIN 100 MG: 125 SUSPENSION ORAL at 11:09

## 2021-12-18 RX ADMIN — PSYLLIUM HUSK 1 PACKET: 3.4 POWDER ORAL at 11:17

## 2021-12-18 RX ADMIN — PIPERACILLIN AND TAZOBACTAM 3.38 G: 3; .375 INJECTION, POWDER, LYOPHILIZED, FOR SOLUTION INTRAVENOUS at 06:26

## 2021-12-18 RX ADMIN — KETOTIFEN FUMARATE 1 DROP: 0.35 SOLUTION/ DROPS OPHTHALMIC at 21:13

## 2021-12-18 RX ADMIN — LAMOTRIGINE 300 MG: 100 TABLET ORAL at 21:20

## 2021-12-18 RX ADMIN — LEVETIRACETAM 1000 MG: 10 INJECTION, SOLUTION INTRAVENOUS at 08:51

## 2021-12-18 RX ADMIN — LEVETIRACETAM 1000 MG: 10 INJECTION, SOLUTION INTRAVENOUS at 21:09

## 2021-12-18 NOTE — CONSULTS
Infectious Disease Consult Note    Reason for Consult: Sepsis due to aspiration PNA   Date of Consultation: December 18, 2021  Date of Admission: 12/17/2021  Referring Physician: Hospitalist     HPI: 48-y.o WM, admitted with sepsis due to aspiration PNA for which ID has been consulted. He is a long-term resident of a group home, has underlying intellectual disability, quadriplegia w contracted ext and seizure d/o. Per caregivers he was not eating and drinking 4 days PTP. He is unable to provide any history, history obtained from chart documentations. He was noted to be febrile on assessment the ED with T max of 103.1, was also tachycardic and hypotensive. He is maintaining O2 sats on 3 L. COVID Ag test is neg. Admission CXR revealed patchy opacities in both. CT abdo/pel negative for an acute intra-abdominal process revealed probable left hip myositis and intra-abdominal adenopathy. UA is unremarkable,  Today's labs showed leukocytosis 11.5, up from 10.3 on initial labs, Procal <0.05, . He is on Zosyn monotherapy, s/p Azithromycin in the ED. Review of Systems: Unobtainable, decreased responsiveness     Past Medical History:  Past Medical History:   Diagnosis Date    Intellectual disability     Quadriplegia, unspecified (HCC)     Flexion contractures upper and lower extremities    Seizure disorder [facility])        Past surgical history   History reviewed. No pertinent surgical history. Social History   Social History     Tobacco Use    Smoking status: Never Smoker    Smokeless tobacco: Never Used   Substance Use Topics    Alcohol use: Never    Drug use: Never        Family history   Family History   Family history unknown: Yes          Allergies:  No Known Allergies      Medications:  No current facility-administered medications on file prior to encounter.      Current Outpatient Medications on File Prior to Encounter   Medication Sig Dispense Refill    cetirizine (ZYRTEC) 10 mg tablet Take 10 mg by mouth daily.  phenytoin ER (Dilantin) 100 mg ER capsule Take 230 mg by mouth daily. Schedule at noon      folic acid (FOLVITE) 1 mg tablet Take 1 mg by mouth daily.  ketotifen (ZADITOR) 0.025 % (0.035 %) ophthalmic solution Administer 1 Drop to both eyes two (2) times a day.  lactulose (CHRONULAC) 10 gram/15 mL solution Take 10 g by mouth daily.  lamoTRIgine (LaMICtal) 150 mg tablet Take 300 mg by mouth two (2) times a day.  levETIRAcetam 1,000 mg tablet Take 1,000 mg by mouth two (2) times a day.  psyllium husk-aspartame (Natural Fiber Supplemnt,asprt,) 3.4 gram pwpk packet Take 1 Packet by mouth daily.              Physical Exam:    Vitals:   Patient Vitals for the past 24 hrs:   Temp Pulse Resp BP SpO2   12/18/21 0700 99 °F (37.2 °C)       12/18/21 0610 99.4 °F (37.4 °C) 89 22 (!) 91/54 97 %   12/18/21 0538  (!) 110 21 93/62 96 %   12/18/21 0418 (!) 101.7 °F (38.7 °C)   101/60 94 %   12/18/21 0115 (!) 102.5 °F (39.2 °C) (!) 106 22 99/60 94 %   12/17/21 2217 99 °F (37.2 °C) (!) 105  106/61 94 %   12/17/21 2117     92 %   12/17/21 1927  (!) 112 20 111/75 97 %   12/17/21 1846 100.2 °F (37.9 °C) (!) 114 25     12/17/21 1614 (!) 103.1 °F (39.5 °C)       12/17/21 1106     94 %   12/17/21 1043  (!) 105 16 103/68    ·   · GEN: NAD, Blank stare   · HEENT: NCAT, PERRLA  · HEART: S1, S2+, RRR, No murmur   · Lungs: Decreased at the bases, no wheeze/rhonchi   · Abdomen: soft, ND, NT, +BS   · Genitourinary: no genital discharge, no bueno  · Extremities: Contracted exts   · Skin: no chronic wounds or ulcers       Labs:   Recent Labs     12/18/21  0503 12/17/21  1058   WBC 11.5* 10.3   HGB 10.3* 12.7   HCT 33.8* 39.7    351     Recent Labs     12/18/21  0503 12/17/21  1210   BUN 11 11   CREA 0.83 0.84       Microbiology Data: None     Imaging:   CXR 12/17: Patchy opacities within the lungs, nonspecific, but leading differential  considerations include infection or pulmonary edema. Superimposed acute disease  on an underlying chronic interstitial process is also possible. Radiographic  follow-up is recommended to exclude other causes of opacification. Assessment / Plan:     1. Sepsis due to aspiration PNA w b/l patchy infiltrates on CXR       Febrile, tachycardic and hypotensive on presentation, not on pressor support       Mild leukocytosis of 11.5, COVID Ag test neg, UA unremarkable       No chronic wounds or ulcers appreciated       Continue on Zosyn, Vanc added, check blood Cx       Routine labs in the morning     2. Poor oral intake for a few days prior to presentation       No acute process on CT abdo/pel, adenopathy noted, unclear significance       Benign abdominal exam     3. Elevated ALP: sig unclear, trending down on todays labs     4.  Intellectual disability, quadriplegia w contracted ext      Cara Up MD     12/18/2021

## 2021-12-18 NOTE — ED NOTES
Bedside report given to Song Gabriel RN. ER tech instructed to feed patient soft foods like apple sauce. Song Gabriel shown pt  s vms that is taped to bed. Reymundo Hay from [facility] also aware it is taped to bed. Care givers went home.

## 2021-12-18 NOTE — PROGRESS NOTES
Progress Note    Patient: Nancy Arias MRN: [id number]  SSN: [id number]    YOB: 1975  Age: 55 y.o. Sex: male      Admit Date: 12/17/2021    LOS: 1 day     Subjective:     55 y.o. male with flexion contractures and bedridden status requiring total care but with oral intake with soft diet, intellectual developmental delay, seizure disorder brought to the emergency room by the caregivers from the group home as patient was not eating and drinking for 4 days. He did not also have any bowel movement, was given enema with not much success. He started having vomiting, several times but unable to get more information from them. He is found with a temperature on arrival to the emergency room and hypertension tachycardia meeting sepsis criteria. Chest x-ray with suspected aspiration pneumonia, CT of the abdomen pelvis done was showing some nonspecific abnormalities but no significant intra-abdominal pathology. Spoke to mother and discussed plan with her    Review of Systems:  Unable to deteremine s/t mental status      Objective:     Vitals:    12/18/21 1000 12/18/21 1100 12/18/21 1200 12/18/21 1300   BP: 94/62 109/78 110/79 (!) 117/59   Pulse: 99 98 92 98   Resp: 20 17 22 25   Temp:  99.6 °F (37.6 °C)     SpO2: 93% 92% 98% 96%   Weight:       Height:            Intake and Output:  Current Shift: No intake/output data recorded. Last three shifts: No intake/output data recorded. Physical Exam:   General : Open eyes to the commands but unable to stay awake. Well-built,  HEENT : PERRLA, dry oral mucosa, atraumatic normocephalic, Normal ear and nose. Neck : Supple, no JVD, no masses noted, no carotid bruit. Lungs : Breath sounds with moderate air entry bilaterally, rhonchi noted, not using accessory muscles to breathe. CVS : Rhythm rate regular, S1+, S2+, tachycardia noted. No murmur or gallop. Abdomen : Soft, nontender, no  bowel sounds active.   Extremities : Upper extremities wrist flexion contractures, lower extremities difficult to move. .  Musculoskeletal : Increased muscle tone power with contracture abnormalities. Skin : Dry, poor skin turgor. no pathological rash. Lymphatic : No cervical lymphadenopathy. Neurological : Unable to evaluate   psychiatric :  unable to evaluate.         Lab/Data Review:  Recent Results (from the past 24 hour(s))   URINALYSIS W/ REFLEX CULTURE    Collection Time: 12/17/21  3:18 PM    Specimen: Urine   Result Value Ref Range    Color Yellow      Appearance Clear Clear      Specific gravity >1.030 (H) 1.003 - 1.030    pH (UA) 7.0 5.0 - 8.0      Protein 100 (A) Negative mg/dL    Glucose Negative Negative mg/dL    Ketone 80 (A) Negative mg/dL    Bilirubin Negative Negative      Blood Negative Negative      Urobilinogen 4.0 (H) 0.1 - 1.0 EU/dL    Nitrites Negative Negative      Leukocyte Esterase Negative Negative      UA:UC IF INDICATED Culture not indicated by UA result Culture not indicated by UA result      WBC 0-4 0 - 4 /hpf    RBC 0-5 0 - 5 /hpf    Bacteria Negative Negative /hpf    Mucus Trace /lpf   SARS-COV-2    Collection Time: 12/17/21  4:20 PM   Result Value Ref Range    SARS-CoV-2 Please find results under separate order     COVID-19 RAPID TEST    Collection Time: 12/17/21  4:20 PM   Result Value Ref Range    Specimen source Nasopharyngeal      COVID-19 rapid test Not Detected Not Detected     METABOLIC PANEL, COMPREHENSIVE    Collection Time: 12/18/21  5:03 AM   Result Value Ref Range    Sodium 140 136 - 145 mmol/L    Potassium 3.5 3.5 - 5.1 mmol/L    Chloride 109 (H) 97 - 108 mmol/L    CO2 25 21 - 32 mmol/L    Anion gap 6 5 - 15 mmol/L    Glucose 119 (H) 65 - 100 mg/dL    BUN 11 6 - 20 mg/dL    Creatinine 0.83 0.70 - 1.30 mg/dL    BUN/Creatinine ratio 13 12 - 20      GFR est AA >60 >60 ml/min/1.73m2    GFR est non-AA >60 >60 ml/min/1.73m2    Calcium 7.9 (L) 8.5 - 10.1 mg/dL    Bilirubin, total 0.3 0.2 - 1.0 mg/dL    AST (SGOT) 29 15 - 37 U/L ALT (SGPT) 18 12 - 78 U/L    Alk. phosphatase 306 (H) 45 - 117 U/L    Protein, total 6.2 (L) 6.4 - 8.2 g/dL    Albumin 2.6 (L) 3.5 - 5.0 g/dL    Globulin 3.6 2.0 - 4.0 g/dL    A-G Ratio 0.7 (L) 1.1 - 2.2     CBC WITH AUTOMATED DIFF    Collection Time: 12/18/21  5:03 AM   Result Value Ref Range    WBC 11.5 (H) 4.1 - 11.1 K/uL    RBC 3.41 (L) 4.10 - 5.70 M/uL    HGB 10.3 (L) 12.1 - 17.0 g/dL    HCT 33.8 (L) 36.6 - 50.3 %    MCV 99.1 (H) 80.0 - 99.0 FL    MCH 30.2 26.0 - 34.0 PG    MCHC 30.5 30.0 - 36.5 g/dL    RDW 13.2 11.5 - 14.5 %    PLATELET 509 222 - 896 K/uL    MPV 9.8 8.9 - 12.9 FL    NRBC 0.0 0.0  WBC    ABSOLUTE NRBC 0.00 0.00 - 0.01 K/uL    NEUTROPHILS 77 (H) 32 - 75 %    LYMPHOCYTES 11 (L) 12 - 49 %    MONOCYTES 10 5 - 13 %    EOSINOPHILS 1 0 - 7 %    BASOPHILS 1 0 - 1 %    IMMATURE GRANULOCYTES 0 0 - 0.5 %    ABS. NEUTROPHILS 8.9 (H) 1.8 - 8.0 K/UL    ABS. LYMPHOCYTES 1.2 0.8 - 3.5 K/UL    ABS. MONOCYTES 1.2 (H) 0.0 - 1.0 K/UL    ABS. EOSINOPHILS 0.1 0.0 - 0.4 K/UL    ABS. BASOPHILS 0.1 0.0 - 0.1 K/UL    ABS. IMM. GRANS. 0.0 0.00 - 0.04 K/UL    DF AUTOMATED           Assessment and plan:      (1) acute hypoxic respiratory failure: 3L NC.  Wean to keep saturation     (2) sepsis: IVF, vanc and zosyn    (3) Aspiration pneumonia : speech, follow speech recommendations    (4) seizure disorder: resume home dose, most liekly had seizure    (5) intellectual disability and quadriplegia: complicates all aspects ofc are    DVT ppx: lovenox     DISPO: will eventually go home once room air and seen by neuro    Spoke to mother    Signed By: Melba Delacruz MD     December 18, 2021

## 2021-12-18 NOTE — PROGRESS NOTES
Problem: Dysphagia (Adult)  Goal: *Acute Goals and Plan of Care (Insert Text)  Description: Speech Therapy Goals  Initiated 12/18/2021  -Patient will participate in modified barium swallow study within 5 day(s), if medically necessary. [ ] Not met  [ ]  MET   [ ] Progressing  [ ] Catherineclyde Tompkins  -Patient will tolerate clear liquid diet with nectar thick consistency without signs/symptoms of aspiration given minimal cues within 3 day(s). [ ] Not met  [ ]  MET   [ ] Progressing  [ ] Catherinezoraidaa Ruse  -Patient will demonstrate understanding of swallow safety precautions and aspiration precautions, diet recs with moderate cues within 5  day(s). [ ] Not met  [ ]  MET   [ ] Progressing  [ ] Discontinue   Outcome: Not Progressing Towards Goal    SPEECH [address] EVALUATION  Patient: Cole Richardson (49 y.o. male)  Date: 12/18/2021  Primary Diagnosis: Aspiration pneumonia ([facility] [address] [J69.0]        Precautions: Aspiration Precaution       ASSESSMENT :  Based on the objective data described below, the patient presents with mild oropharyngeal dysphagia. The patient was positioned upright, NC 2L, with noted contractures of the body. He is nonverbal, but alert and responsive to name and focusing attention to the SLP and food and liquid items. The nurse reported that the client recently moved from a [address] [place] [address] in [place], [place] to a new [address] [place] [address] in [address] this past Tuesday. Client's diet has been Easy to Chew (chopped) diet with extra gravy for moisture and thin liquids via straw. However, since he moved, staff reports the client has been refusing to eat. The patient noted to have full dentition. Due to the patients contractures, use of a straw is the best option to provide liquids. Breakfast was at bedside, but he refused to open his mouth. The SLP provided iced toothette within the cheeks and ice chips to stimulate the lips for possible PO intake/assist with opening mouth.  The SLP offerred ice chips, applesauce, ice water, and nectar orange juice. He refused to open his mouth for ice chips and applesauce, but accepted liquids via straw. Noted cough response from ice water. However, accepted 4oz nectar OJ via straw without difficulty. Good HLE via digital palpation. He maintained 96 O2. Unable to assess patient's ability to [facility] or manipulate a bolus. Nurse was present for a portion of the evaluation/treatment because she had to get blood from the patient for labwork. Patient will benefit from skilled intervention to address the above impairments. Patients rehabilitation potential is considered to be Good     PLAN :  Recommendations and Planned Interventions:  Downgrade diet to mildly thick clear liquid diet via straw. Staff to feed the client all meals. SLP to follow and trial upgraded PO textures. Frequency/Duration: Patient will be followed by speech-language pathology daily to address goals. Discharge Recommendations: To Be Determined     SUBJECTIVE:   Patient nonverbal, but alert and aware of surroundings. OBJECTIVE:     CXR Results  (Last 48 hours)                 12/17/21 1150  XR CHEST SNGL V Final result    Impression:  Patchy opacities within the lungs, nonspecific, but leading differential   considerations include infection or pulmonary edema. Superimposed acute disease   on an underlying chronic interstitial process is also possible. Radiographic   follow-up is recommended to exclude other causes of opacification. Narrative:  Examination: XR CHEST SNGL V        History: weakness       Comparison: None available. FINDINGS:       Single frontal portable view of the chest. Somewhat suboptimal evaluation   related to patient positioning and portable technique. Lung volumes are   symmetric. Hazy and reticular opacities in the lungs, most pronounced in the   left mid and lower lung as well as the right lower lung. No radiographically   evident pneumothorax. Small layering effusion on the left could contribute to   the above described opacification. The cardiac silhouette is within normal   limits for technique. Generator pack overlies and obscures portions of the left   chest. No acute osseous abnormalities are evident. Please note that portions of   the lung apices are obscured by overlying structures. Gaseous distention of   bowel in the upper abdomen is better evaluated on the concurrently performed   abdominal radiograph. CT Results  (Last 48 hours)                 12/17/21 1722  CT ABD PELV W CONT Final result    Impression:      1. Small focus of intraluminal gas within the urinary bladder. Please correlate   for recent instrumentation or other causes of intraluminal gas, including an   infectious process. 2. Somewhat reticular nodular opacities in the lung bases, suboptimally   evaluated due to respiratory motion. Some of these have what appears to be a   tree and bud morphology, suggesting either chronic aspiration or an infectious   or inflammatory small airways disease. Otherwise a fibrotic process should be   considered. This would be better characterized with dedicated chest CT as   clinically warranted. 3. Underdistention versus circumferential wall thickening of the distal   esophagus. 4. Probable left trochanteric bursitis with sterility of the fluid   indeterminate. 5. Probable myositis ossificans surrounding the left hip. Please correlate for   history of prior trauma. 6. Enlarged lymph nodes in the upper abdomen, nonspecific. Comparison with   outside imaging would be helpful, if available, to determine longer term   stability. Otherwise short interval follow-up in 3 months is recommended.        Narrative:  Exam: CT ABD PELV W CONT       TECHNIQUE: Multiple transaxial CT images of the abdomen and pelvis were obtained   following the uneventful administration of 100 mL Isovue 370 intravenous   contrast. Coronal and sagittal reformatted images were provided. Dose reduction: All CT scans at this facility are performed using dose reduction   optimization techniques as appropriate to a performed exam including the   following: Automated exposure control, adjustments of the mA and/or kV according   to patient size, or use of iterative reconstruction technique. COMPARISON: Chest and abdominal radiographs performed the same date       HISTORY: Abdominal pain       FINDINGS:       Lower thorax: The lung bases are somewhat suboptimally evaluated due to the   degree of respiratory motion. Within exam limitations there are reticular and   somewhat nodular opacities within the lung bases, some of which could have a   tree-in-bud morphology. Under distention versus wall thickening of the distal   esophagus. Abdomen and pelvis:   Liver: Subcentimeter well-circumscribed hypodensities are too small to further   characterize. Gallbladder: Unremarkable. Biliary system: Nondilated. Pancreas: Unremarkable. Spleen: Unremarkable. Adrenal glands: Normal.   Kidneys and ureters: The kidneys enhance symmetrically. There is a   well-circumscribed subcentimeter hypodensity in the lower pole of the left   kidney that is too small to further characterize. No hydronephrosis or   hydroureter. Urinary bladder: Decompressed, limiting further evaluation. There is a small   focus of intraluminal gas within the urinary bladder. Please correlate for   recent instrumentation or potentially infectious process. Reproductive organs: Unremarkable. Bowel: No findings of bowel obstruction. The appendix appears normal.       Peritoneum: No pneumoperitoneum. No free fluid. Lymph nodes: Prominent nonspecific lymph nodes adjacent to the esophageal hiatus   in the upper abdomen measuring up to 11 mm (axial series 201, image 47, for   example). Aorta and other vessels: The aorta is within normal limits.  The systemic and   portal venous systems are opacified. Proximal splanchnic vessels appear patent. Bones: No acute or aggressive osseous abnormalities are evident. Superficial soft tissues: There is a fluid collection lateral to the left hip   adjacent to the greater trochanter measuring up to approximately 4.7 x 1.6 x 9.5   cm extending cephalad into the gluteus musculature. It has peripheral   hyperdensity and could represent trochanteric bursitis with sterility   indeterminate. This is either a bilobed communicating fluid collection or 2   adjacent small fluid collections. Otherwise there is what appears to be myositis   ossificans surrounding the left hip. Past Medical History:   Diagnosis Date    Intellectual disability     Quadriplegia, unspecified (HCC)     Flexion contractures upper and lower extremities    Seizure disorder ([facility] [address]    History reviewed. No pertinent surgical history. Prior Level of Function/Home Situation: Dependent for all ADLs/IADLs  Home Situation  Home Environment: Group home  One/Two Story Residence: Other (Comment)  Living Alone: No  Support Systems: Adult Group Home,Parent(s)  Patient Expects to be Discharged to[de-identified] Group home  Current DME Used/Available at Home: Other (comment) (unknown)  Diet prior to admission: Easy to Chew/thin   Current Diet:  Mildly thick clear liquids   Cognitive and Communication Status:  Neurologic State: Alert  Orientation Level: Unable to verbalize  Cognition: No command following        Pain:  Pain Scale 1: Numeric (0 - 10)  Pain Intensity 1: 0       After treatment:   Patient left in no apparent distress in bed, Call bell within reach, and Nursing notified    COMMUNICATION/EDUCATION:   Patient was educated regarding purpose of SLP assessment, POC, diet recs and sw safety precautions. Patient demonstrated Vladislaven Kraft understanding as evidenced by facial expressions and nonverbal cues.     The patient's plan of care including recommendations, planned interventions, and recommended diet changes were discussed with: Registered nurse and Physician. Patient is unable to participate in goal setting and plan of care.     Thank you for this referral.  Olive Laboy M.S. CCC-SLP

## 2021-12-18 NOTE — PROGRESS NOTES
Pt noted tachycardic,febrile,increased oxygyen demand. S/s persist despite tx per MD orders to include,fluids,tylenol,abx. Message left for  ID consult Dr. Elyse Lockhart. Pt transferred to the ICU at this time. Report given to Kenyatta Cantu. Also pt's only belonging;VPS was given to receiving nurse. Failed attempt x2 to notify care takers. Unable to leave a VM.

## 2021-12-18 NOTE — CONSULTS
Neurology Consult Note    HPI  Mr. Horacio Otero is a 55 y.o.  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 Pressure review, patient lives in a group home where his caretakers reported that he had not been eating or drinking like he usually does for a few days and has not been having any bowel movements. He reportedly has also had some vomiting as well. In the ED, patient was febrile, hypertensive, tachycardic and met sepsis criteria for which IV antibiotics were started. Chest x-ray revealed likely aspiration pneumonia. Neurology consulted for encephalopathy and possible seizure activity that may have brought on patient's current condition. No seizure activity was witnessed at his group home where during his hospitalization however. Home AEDs: Levetiracetam 1000 BID, Phenytoin 100/130, Lamotrigine 300 BID    Patient lethargic but awakens with minor vocal stimulation. Patient does not produce speak but does moan and doesn't follow any central peripheral commands. Patient has flexion contractures in the bilateral upper extremities and bilateral lower extremities. Patient seems to have a VNS generator in the left upper chest.      IMPRESSION  Mr. Horacio Otero is a 55 y.o.  male with the above history presented with decreased PO intake, lack of bowel movements and vomiting. In the ED, patient was found to be septic with chest x-ray concerning for aspiration pneumonia. Patient was started on IV antibiotics and admitted. Neurology consulted for possible seizure activity that may have initially brought on all of his symptoms. No seizure activity was witnessed at his group home where during his hospitalization however. Etiology of patient's presentation may have indeed resulted from an unwitnessed seizure episode leading to aspiration pneumonia and subsequent sepsis.   However, given patient's medical history he likely has degree of intractable epilepsy so will hold off on changing his seizure medications at the current time. Will consider increasing seizure medications if patient is seen to have any breakthrough seizure activity on an inpatient basis. We will continue with home AEDs for now. Patient also has VNS in place. RECOMMENDATIONS        Possible Breakthrough Seizure  Intractable Epilepsy W/VNS In Place  Intellectual Disability  Quadriplegia  Associated: Sepsis d/t Aspiration PNA  -Q6Hr NeuroChecks  -Seizure Precautions  -Seizure Prophylaxis: Levetiracetam 1000 BID, Phenytoin 100/130, Lamotrigine 300 BID  --> We will consider adjusting AEDs if patient has witnessed seizures in the hospital  -STAT IV Lorazepam 2 mg with any clinical seizure activity lasting > 3 minutes and contact Neurology for further recommendations  -Management of metabolic/infectious derangements to referring teams       Review of Systems  Unable to fully ascertain d/t mental status    Physical/Neurological Exam  Cardiovascular: tachycardic at times  Pulmonary: On supplemental oxygen  Skin: warm and dry      Patient lethargic but awakens with minor vocal stimulation; does not follow any central peripheral commands  Patient moans in response to vocal and physical stimulation  Unable to test for language  Pupils react to light bilaterally; EOM Intact   Blink to threat inconsistent bilaterally  No facial droop   Motor: At least 2/5 throughout to pain stimulation  Flexion contractures throughout in the bilateral upper and lower extremities  No abnormal movements   Nor increased tone throughout    Past Medical History:   Diagnosis Date    Intellectual disability     Quadriplegia, unspecified (HCC)     Flexion contractures upper and lower extremities    Seizure disorder ([facility] [address]       History reviewed. No pertinent surgical history.   No Known Allergies  Family History   Family history unknown: Yes        Social Connections:  Frequency of Communication with Friends and Family: Not on file    Frequency of Social Gatherings with Friends and Family: Not on file    Attends Yazdanism Services: Not on file    Active Member of Clubs or Organizations: Not on file    Attends Club or Organization Meetings: Not on file    Marital Status: Not on file         Medications  Current Outpatient Medications   Medication Instructions    cetirizine (ZYRTEC) 10 mg, Oral, DAILY    folic acid (FOLVITE) 1 mg, Oral, DAILY    ketotifen (ZADITOR) 0.025 % (0.035 %) ophthalmic solution 1 Drop, Both Eyes, 2 TIMES DAILY    lactulose (CHRONULAC) 10 g, Oral, DAILY    lamoTRIgine (LAMICTAL) 300 mg, Oral, 2 TIMES DAILY    levETIRAcetam 1,000 mg, Oral, 2 TIMES DAILY    phenytoin ER (DILANTIN) 230 mg, Oral, DAILY, Schedule at noon     psyllium husk-aspartame (Natural Fiber Supplemnt,asprt,) 3.4 gram pwpk packet 1 Packet, Oral, DAILY       Current Facility-Administered Medications   Medication Dose Route Frequency    levETIRAcetam in saline (iso-os) (KEPPRA) infusion 1,000 mg  1,000 mg IntraVENous BID    acetaminophen (TYLENOL) suppository 650 mg  650 mg Rectal Q4H PRN    ondansetron (ZOFRAN) injection 4 mg  4 mg IntraVENous Q6H PRN    NOREPINephrine (LEVOPHED) 8 mg in 5% dextrose 250mL (32 mcg/mL) infusion  0.5-16 mcg/min IntraVENous TITRATE    Vancomycin - Pharmacy to Dose   Other Rx Dosing/Monitoring    vancomycin (VANCOCIN) 1,000 mg in 0.9% sodium chloride 250 mL (VIAL-MATE)  1,000 mg IntraVENous Q12H    phenytoin (DILANTIN) 100 mg/4 mL oral suspension 100 mg  100 mg Oral DAILY    phenytoin (DILANTIN) 100 mg/4 mL oral suspension 130 mg  130 mg Oral QHS    [START ON 12/20/2021] Vancomycin - Please draw trough prior to dose 12/20 @ 1100   Other ONCE    0.9% sodium chloride infusion 1,000 mL  1,000 mL IntraVENous CONTINUOUS    0.9% sodium chloride infusion 1,000 mL  1,000 mL IntraVENous CONTINUOUS    cetirizine (ZYRTEC) tablet 10 mg  10 mg Oral DAILY    folic acid (FOLVITE) tablet 1 mg  1 mg Oral DAILY    ketotifen (ZADITOR) 0.025 % (0.035 %) ophthalmic solution 1 Drop  1 Drop Both Eyes BID    lactulose (CHRONULAC) 10 gram/15 mL solution 15 mL  10 g Oral DAILY    lamoTRIgine (LaMICtal) tablet 300 mg  300 mg Oral BID    psyllium husk-aspartame (METAMUCIL FIBER) packet 1 Packet  1 Packet Oral DAILY    sodium chloride (NS) flush 5-40 mL  5-40 mL IntraVENous Q8H    sodium chloride (NS) flush 5-40 mL  5-40 mL IntraVENous PRN    acetaminophen (TYLENOL) tablet 650 mg  650 mg Oral Q4H PRN    albuterol (PROVENTIL HFA, VENTOLIN HFA, PROAIR HFA) inhaler 2 Puff  2 Puff Inhalation Q4H PRN    enoxaparin (LOVENOX) injection 40 mg  40 mg SubCUTAneous Q24H    piperacillin-tazobactam (ZOSYN) 3.375 g in 0.9% sodium chloride (MBP/ADV) 100 mL MBP  3.375 g IntraVENous Q8H        Objective  Temp:  [99 °F (37.2 °C)-103.1 °F (39.5 °C)]   Pulse (Heart Rate):  []   BP: ()/(50-79)   Resp Rate:  [16-25]   O2 Sat (%):  [90 %-98 %]   Weight:  [59 kg (130 lb)]     Intake/Output Summary (Last 24 hours) at 12/18/2021 1801  Last data filed at 12/18/2021 1530  Gross per 24 hour   Intake    Output 300 ml   Net -300 ml     Wt Readings from Last 3 Encounters:   12/17/21 59 kg (130 lb)        Labs  Recent Results (from the past 24 hour(s))   METABOLIC PANEL, COMPREHENSIVE    Collection Time: 12/18/21  5:03 AM   Result Value Ref Range    Sodium 140 136 - 145 mmol/L    Potassium 3.5 3.5 - 5.1 mmol/L    Chloride 109 (H) 97 - 108 mmol/L    CO2 25 21 - 32 mmol/L    Anion gap 6 5 - 15 mmol/L    Glucose 119 (H) 65 - 100 mg/dL    BUN 11 6 - 20 mg/dL    Creatinine 0.83 0.70 - 1.30 mg/dL    BUN/Creatinine ratio 13 12 - 20      GFR est AA >60 >60 ml/min/1.73m2    GFR est non-AA >60 >60 ml/min/1.73m2    Calcium 7.9 (L) 8.5 - 10.1 mg/dL    Bilirubin, total 0.3 0.2 - 1.0 mg/dL    AST (SGOT) 29 15 - 37 U/L    ALT (SGPT) 18 12 - 78 U/L    Alk.  phosphatase 306 (H) 45 - 117 U/L    Protein, total 6.2 (L) 6.4 - 8.2 g/dL    Albumin 2.6 (L) 3.5 - 5.0 g/dL    Globulin 3.6 2.0 - 4.0 g/dL    A-G Ratio 0.7 (L) 1.1 - 2.2     CBC WITH AUTOMATED DIFF    Collection Time: 12/18/21  5:03 AM   Result Value Ref Range    WBC 11.5 (H) 4.1 - 11.1 K/uL    RBC 3.41 (L) 4.10 - 5.70 M/uL    HGB 10.3 (L) 12.1 - 17.0 g/dL    HCT 33.8 (L) 36.6 - 50.3 %    MCV 99.1 (H) 80.0 - 99.0 FL    MCH 30.2 26.0 - 34.0 PG    MCHC 30.5 30.0 - 36.5 g/dL    RDW 13.2 11.5 - 14.5 %    PLATELET 098 348 - 489 K/uL    MPV 9.8 8.9 - 12.9 FL    NRBC 0.0 0.0  WBC    ABSOLUTE NRBC 0.00 0.00 - 0.01 K/uL    NEUTROPHILS 77 (H) 32 - 75 %    LYMPHOCYTES 11 (L) 12 - 49 %    MONOCYTES 10 5 - 13 %    EOSINOPHILS 1 0 - 7 %    BASOPHILS 1 0 - 1 %    IMMATURE GRANULOCYTES 0 0 - 0.5 %    ABS. NEUTROPHILS 8.9 (H) 1.8 - 8.0 K/UL    ABS. LYMPHOCYTES 1.2 0.8 - 3.5 K/UL    ABS. MONOCYTES 1.2 (H) 0.0 - 1.0 K/UL    ABS. EOSINOPHILS 0.1 0.0 - 0.4 K/UL    ABS. BASOPHILS 0.1 0.0 - 0.1 K/UL    ABS. IMM. GRANS. 0.0 0.00 - 0.04 K/UL    DF AUTOMATED            Significant Diagnostic Studies  All images independently visualized    CT ABD PELV W CONT   Final Result      1. Small focus of intraluminal gas within the urinary bladder. Please correlate   for recent instrumentation or other causes of intraluminal gas, including an   infectious process. 2. Somewhat reticular nodular opacities in the lung bases, suboptimally   evaluated due to respiratory motion. Some of these have what appears to be a   tree and bud morphology, suggesting either chronic aspiration or an infectious   or inflammatory small airways disease. Otherwise a fibrotic process should be   considered. This would be better characterized with dedicated chest CT as   clinically warranted. 3. Underdistention versus circumferential wall thickening of the distal   esophagus. 4. Probable left trochanteric bursitis with sterility of the fluid   indeterminate.    5. Probable myositis ossificans surrounding the left hip. Please correlate for   history of prior trauma. 6. Enlarged lymph nodes in the upper abdomen, nonspecific. Comparison with   outside imaging would be helpful, if available, to determine longer term   stability. Otherwise short interval follow-up in 3 months is recommended. XR CHEST SNGL V   Final Result   Patchy opacities within the lungs, nonspecific, but leading differential   considerations include infection or pulmonary edema. Superimposed acute disease   on an underlying chronic interstitial process is also possible. Radiographic   follow-up is recommended to exclude other causes of opacification. XR ABD (KUB)   Final Result   Mildly prominent gas-filled loops of bowel within the central abdomen with   overall nonobstructive bowel gas pattern. This document has been prepared by the Dragon voice recognition system, typographical errors may have occurred.  Attempts have been made to correct errors, however inadvertent errors may persist.

## 2021-12-18 NOTE — H&P
History and Physical              Subjective :   Chief Complaint : No oral intake for 4 days, Found with temperature and suspected aspiration pneumonia with work-up    Source of information : From the records, patient unable to provide any information. History of present illness:   55 y.o. male with flexion contractures and bedridden status requiring total care but with oral intake with soft diet, intellectual developmental delay, seizure disorder brought to the emergency room by the caregivers from the group home as patient was not eating and drinking for 4 days. He did not also have any bowel movement, was given enema with not much success. He started having vomiting, several times but unable to get more information from them. He is found with a temperature on arrival to the emergency room and hypertension tachycardia meeting sepsis criteria. Chest x-ray with suspected aspiration pneumonia, CT of the abdomen pelvis done was showing some nonspecific abnormalities but no significant intra-abdominal pathology. Past Medical History:   Diagnosis Date    Intellectual disability     Quadriplegia, unspecified (HCC)     Flexion contractures upper and lower extremities    Seizure disorder ([facility] [address]      History reviewed. No pertinent surgical history. Family History   Family history unknown: Yes      Social History     Tobacco Use    Smoking status: Never Smoker    Smokeless tobacco: Never Used   Substance Use Topics    Alcohol use: Never       Prior to Admission medications    Medication Sig Start Date End Date Taking? Authorizing Provider   cetirizine (ZYRTEC) 10 mg tablet Take 10 mg by mouth daily. Yes Provider, Historical   phenytoin ER (Dilantin) 100 mg ER capsule Take 230 mg by mouth daily. Schedule at noon   Yes Provider, Historical   folic acid (FOLVITE) 1 mg tablet Take 1 mg by mouth daily.    Yes Provider, Historical   ketotifen (ZADITOR) 0.025 % (0.035 %) ophthalmic solution Administer 1 Drop to both eyes two (2) times a day. Yes Provider, Historical   lactulose (CHRONULAC) 10 gram/15 mL solution Take 10 g by mouth daily. Yes Provider, Historical   lamoTRIgine (LaMICtal) 150 mg tablet Take 300 mg by mouth two (2) times a day. Yes Provider, Historical   levETIRAcetam 1,000 mg tablet Take 1,000 mg by mouth two (2) times a day. Yes Provider, Historical   psyllium husk-aspartame (Natural Fiber Supplemnt,asprt,) 3.4 gram pwpk packet Take 1 Packet by mouth daily. Yes Provider, Historical     No Known Allergies          Review of Systems: Unable to get any information from the patient      Vitals:     Patient Vitals for the past 12 hrs:   Temp Pulse Resp BP SpO2   12/17/21 1927  (!) 112 20 111/75 97 %   12/17/21 1846 100.2 °F (37.9 °C) (!) 114 25     12/17/21 1614 (!) 103.1 °F (39.5 °C)       12/17/21 1106     94 %   12/17/21 1043  (!) 105 16 103/68        Physical Exam:   General : Open eyes to the commands but unable to stay awake. Well-built,  HEENT : PERRLA, dry oral mucosa, atraumatic normocephalic, Normal ear and nose. Neck : Supple, no JVD, no masses noted, no carotid bruit. Lungs : Breath sounds with moderate air entry bilaterally, rhonchi noted, not using accessory muscles to breathe. CVS : Rhythm rate regular, S1+, S2+, tachycardia noted. No murmur or gallop. Abdomen : Soft, nontender, no  bowel sounds active. Extremities : Upper extremities wrist flexion contractures, lower extremities difficult to move. .  Musculoskeletal : Increased muscle tone power with contracture abnormalities. Skin : Dry, poor skin turgor. no pathological rash. Lymphatic : No cervical lymphadenopathy. Neurological : Unable to evaluate   psychiatric :  unable to evaluate.          Data Review:   Recent Results (from the past 24 hour(s))   EKG, 12 LEAD, INITIAL    Collection Time: 12/17/21 10:55 AM   Result Value Ref Range    Ventricular Rate 108 BPM    Atrial Rate 108 BPM    P-R Interval 142 ms QRS Duration 88 ms    Q-T Interval 324 ms    QTC Calculation (Bezet) 434 ms    Calculated P Axis 5 degrees    Calculated R Axis 134 degrees    Calculated T Axis 6 degrees    Diagnosis       Sinus tachycardia  Right axis deviation  Nonspecific anterior T wave changes  Abnormal ECG  No previous ECGs available  Confirmed by Eva Brooks MD, Slim Barry ([id number]) on 12/17/2021 12:20:34 PM     CBC WITH AUTOMATED DIFF    Collection Time: 12/17/21 10:58 AM   Result Value Ref Range    WBC 10.3 4.1 - 11.1 K/uL    RBC 4.18 4.10 - 5.70 M/uL    HGB 12.7 12.1 - 17.0 g/dL    HCT 39.7 36.6 - 50.3 %    MCV 95.0 80.0 - 99.0 FL    MCH 30.4 26.0 - 34.0 PG    MCHC 32.0 30.0 - 36.5 g/dL    RDW 13.7 11.5 - 14.5 %    PLATELET 879 643 - 663 K/uL    MPV 9.8 8.9 - 12.9 FL    NRBC 0.0 0.0  WBC    ABSOLUTE NRBC 0.00 0.00 - 0.01 K/uL    NEUTROPHILS 81 (H) 32 - 75 %    LYMPHOCYTES 9 (L) 12 - 49 %    MONOCYTES 7 5 - 13 %    EOSINOPHILS 2 0 - 7 %    BASOPHILS 1 0 - 1 %    IMMATURE GRANULOCYTES 0 0 - 0.5 %    ABS. NEUTROPHILS 8.3 (H) 1.8 - 8.0 K/UL    ABS. LYMPHOCYTES 0.9 0.8 - 3.5 K/UL    ABS. MONOCYTES 0.8 0.0 - 1.0 K/UL    ABS. EOSINOPHILS 0.2 0.0 - 0.4 K/UL    ABS. BASOPHILS 0.1 0.0 - 0.1 K/UL    ABS. IMM.  GRANS. 0.0 0.00 - 0.04 K/UL    DF AUTOMATED     LACTIC ACID    Collection Time: 12/17/21 10:58 AM   Result Value Ref Range    Lactic acid 1.8 0.4 - 2.0 mmol/L   PROCALCITONIN    Collection Time: 12/17/21 11:30 AM   Result Value Ref Range    Procalcitonin <0.05 (H) 0 ng/mL   TROPONIN-HIGH SENSITIVITY    Collection Time: 12/17/21 11:30 AM   Result Value Ref Range    Troponin-High Sensitivity 7 0 - 76 ng/L   LIPASE    Collection Time: 12/17/21 12:10 PM   Result Value Ref Range    Lipase 138 73 - 209 U/L   METABOLIC PANEL, COMPREHENSIVE    Collection Time: 12/17/21 12:10 PM   Result Value Ref Range    Sodium 139 136 - 145 mmol/L    Potassium 3.9 3.5 - 5.1 mmol/L    Chloride 106 97 - 108 mmol/L    CO2 24 21 - 32 mmol/L    Anion gap 9 5 - 15 mmol/L Glucose 104 (H) 65 - 100 mg/dL    BUN 11 6 - 20 mg/dL    Creatinine 0.84 0.70 - 1.30 mg/dL    BUN/Creatinine ratio 13 12 - 20      GFR est AA >60 >60 ml/min/1.73m2    GFR est non-AA >60 >60 ml/min/1.73m2    Calcium 8.8 8.5 - 10.1 mg/dL    Bilirubin, total 0.3 0.2 - 1.0 mg/dL    AST (SGOT) 28 15 - 37 U/L    ALT (SGPT) 22 12 - 78 U/L    Alk. phosphatase 456 (H) 45 - 117 U/L    Protein, total 8.2 6.4 - 8.2 g/dL    Albumin 3.6 3.5 - 5.0 g/dL    Globulin 4.6 (H) 2.0 - 4.0 g/dL    A-G Ratio 0.8 (L) 1.1 - 2.2     NT-PRO BNP    Collection Time: 12/17/21 12:10 PM   Result Value Ref Range    NT pro-BNP 31 <125 pg/mL   URINALYSIS W/ REFLEX CULTURE    Collection Time: 12/17/21  3:18 PM    Specimen: Urine   Result Value Ref Range    Color Yellow      Appearance Clear Clear      Specific gravity >1.030 (H) 1.003 - 1.030    pH (UA) 7.0 5.0 - 8.0      Protein 100 (A) Negative mg/dL    Glucose Negative Negative mg/dL    Ketone 80 (A) Negative mg/dL    Bilirubin Negative Negative      Blood Negative Negative      Urobilinogen 4.0 (H) 0.1 - 1.0 EU/dL    Nitrites Negative Negative      Leukocyte Esterase Negative Negative      UA:UC IF INDICATED Culture not indicated by UA result Culture not indicated by UA result      WBC 0-4 0 - 4 /hpf    RBC 0-5 0 - 5 /hpf    Bacteria Negative Negative /hpf    Mucus Trace /lpf   SARS-COV-2    Collection Time: 12/17/21  4:20 PM   Result Value Ref Range    SARS-CoV-2 Please find results under separate order     COVID-19 RAPID TEST    Collection Time: 12/17/21  4:20 PM   Result Value Ref Range    Specimen source Nasopharyngeal      COVID-19 rapid test Not Detected Not Detected         Radiologic Studies :   CT Results  (Last 48 hours)               12/17/21 1722  CT ABD PELV W CONT Final result    Impression:      1. Small focus of intraluminal gas within the urinary bladder. Please correlate   for recent instrumentation or other causes of intraluminal gas, including an   infectious process.    2. Somewhat reticular nodular opacities in the lung bases, suboptimally   evaluated due to respiratory motion. Some of these have what appears to be a   tree and bud morphology, suggesting either chronic aspiration or an infectious   or inflammatory small airways disease. Otherwise a fibrotic process should be   considered. This would be better characterized with dedicated chest CT as   clinically warranted. 3. Underdistention versus circumferential wall thickening of the distal   esophagus. 4. Probable left trochanteric bursitis with sterility of the fluid   indeterminate. 5. Probable myositis ossificans surrounding the left hip. Please correlate for   history of prior trauma. 6. Enlarged lymph nodes in the upper abdomen, nonspecific. Comparison with   outside imaging would be helpful, if available, to determine longer term   stability. Otherwise short interval follow-up in 3 months is recommended. Narrative:  Exam: CT ABD PELV W CONT       TECHNIQUE: Multiple transaxial CT images of the abdomen and pelvis were obtained   following the uneventful administration of 100 mL Isovue 370 intravenous   contrast. Coronal and sagittal reformatted images were provided. Dose reduction: All CT scans at this facility are performed using dose reduction   optimization techniques as appropriate to a performed exam including the   following: Automated exposure control, adjustments of the mA and/or kV according   to patient size, or use of iterative reconstruction technique. COMPARISON: Chest and abdominal radiographs performed the same date       HISTORY: Abdominal pain       FINDINGS:       Lower thorax: The lung bases are somewhat suboptimally evaluated due to the   degree of respiratory motion. Within exam limitations there are reticular and   somewhat nodular opacities within the lung bases, some of which could have a   tree-in-bud morphology.  Under distention versus wall thickening of the distal esophagus. Abdomen and pelvis:   Liver: Subcentimeter well-circumscribed hypodensities are too small to further   characterize. Gallbladder: Unremarkable. Biliary system: Nondilated. Pancreas: Unremarkable. Spleen: Unremarkable. Adrenal glands: Normal.   Kidneys and ureters: The kidneys enhance symmetrically. There is a   well-circumscribed subcentimeter hypodensity in the lower pole of the left   kidney that is too small to further characterize. No hydronephrosis or   hydroureter. Urinary bladder: Decompressed, limiting further evaluation. There is a small   focus of intraluminal gas within the urinary bladder. Please correlate for   recent instrumentation or potentially infectious process. Reproductive organs: Unremarkable. Bowel: No findings of bowel obstruction. The appendix appears normal.       Peritoneum: No pneumoperitoneum. No free fluid. Lymph nodes: Prominent nonspecific lymph nodes adjacent to the esophageal hiatus   in the upper abdomen measuring up to 11 mm (axial series 201, image 47, for   example). Aorta and other vessels: The aorta is within normal limits. The systemic and   portal venous systems are opacified. Proximal splanchnic vessels appear patent. Bones: No acute or aggressive osseous abnormalities are evident. Superficial soft tissues: There is a fluid collection lateral to the left hip   adjacent to the greater trochanter measuring up to approximately 4.7 x 1.6 x 9.5   cm extending cephalad into the gluteus musculature. It has peripheral   hyperdensity and could represent trochanteric bursitis with sterility   indeterminate. This is either a bilobed communicating fluid collection or 2   adjacent small fluid collections. Otherwise there is what appears to be myositis   ossificans surrounding the left hip.                CXR Results  (Last 48 hours)               12/17/21 1150  XR CHEST SNGL V Final result    Impression:  Patchy opacities within the lungs, nonspecific, but leading differential   considerations include infection or pulmonary edema. Superimposed acute disease   on an underlying chronic interstitial process is also possible. Radiographic   follow-up is recommended to exclude other causes of opacification. Narrative:  Examination: XR CHEST SNGL V        History: weakness       Comparison: None available. FINDINGS:       Single frontal portable view of the chest. Somewhat suboptimal evaluation   related to patient positioning and portable technique. Lung volumes are   symmetric. Hazy and reticular opacities in the lungs, most pronounced in the   left mid and lower lung as well as the right lower lung. No radiographically   evident pneumothorax. Small layering effusion on the left could contribute to   the above described opacification. The cardiac silhouette is within normal   limits for technique. Generator pack overlies and obscures portions of the left   chest. No acute osseous abnormalities are evident. Please note that portions of   the lung apices are obscured by overlying structures. Gaseous distention of   bowel in the upper abdomen is better evaluated on the concurrently performed   abdominal radiograph. Assessment and Plan :     Sepsis syndrome: Most likely source is pulmonary suspected aspiration pneumonia    Aspiration pneumonia: This patient needed a soft diet, with vomiting I am suspecting more of aspiration rather than community-acquired and patient is from institution. We will keep him on Zosyn and follow-up closely    Seizure disorder: On medications but unable to take by mouth at this time. What ever we can change to IV we will do that and monitor closely    Intellectual disability severe with developmental delay, patient at baseline follows simple commands sometimes and nonverbal status.     Quadriplegia secondary to muscular skeletal deformities needing total care    Admitted to medical telemetry, full CODE STATUS, home medications reviewed from the [facility]. Has power of  on the file, but no advance medical directives and patient cannot make decisions. As per the information he is a full [facility] STATUS from the [facility]. CC : Unknown, Provider, MD  Signed By: Jaime Johns MD     December 17, 2021      This dictation was done by dragon, computer voice recognition software. Often unanticipated grammatical, syntax, [place] phones and other interpretive errors are inadvertently transcribed. Please excuse errors that have escaped final proofreading.

## 2021-12-18 NOTE — PROGRESS NOTES
Day #1 of Vancomycin  Consult provided for this 55 y.o. male for indication of suspected aspiration pneumonia, sepsis.   Antibiotic regimen:  Vancomycin + Zosyn  Concomitant nephrotoxic drugs: NE  Frequency of BMP: Not scheduled    Recent Labs     21  0503 21  1210 21  1058   WBC 11.5*  --  10.3   CREA 0.83 0.84  --    BUN 11 11  --      Est CrCl: ~75-80 ml/min  Temp (24hrs), Av.7 °F (38.2 °C), Min:99 °F (37.2 °C), Max:103.1 °F (39.5 °C)    Cultures:   None    MRSA Swab: Pending    Target range: AUC/STANLEY 400-600    Assessment/Plan:   Febrile, tachycardic, hypotensive, tachypnea  Will initiate therapy at 1000 mg q12h for a predicted AUC of 534  Will check a trough prior to dose  @ 1100  Tentative antimicrobial stop date  (7 day duration)

## 2021-12-18 NOTE — PROGRESS NOTES
Reason for Admission:  Aspiration PNA                     RUR Score:      10%               Plan for utilizing home health:      From a group home, [address] [place] [address]    PCP: First and Last name:  Unknown, Provider, MD     Name of Practice:    Are you a current patient: Yes/No: Patient does not have PCP At this time   Approximate date of last visit:    Can you participate in a virtual visit with your PCP: no                    Current Advanced Directive/Advance Care Plan: Full Code      Healthcare Decision Maker: Mother, Cristal Barclay, [phone]  Click here to [address] including selection of the [address] Relationship (ie \"Primary\")                             Transition of Care Plan:                  Return to group home    CM spoke with patient's mother, Ms. Josue Altman, via phone to complete DCP assessment. Ms. Josue Altman explained that patient has recently moved to a group home in the area, [address] [place] [address], address on chart confirmed, from a group home in [place]. Ms. Josue Altman lives in [place] and wanted patient closer to her. Patient is total care and receives all his care from the group home. Patient currently does not have an established PCP since he recently moved to new group home this week, however Ms. Josue Altman states that there is an appointment for one, however she does not have the information at this time. CM requested group home contact, she stated that Ms. Priscila Davis or Ms. Andradelio Sada would be the best contact but she does not have their number available to her at this time however she would provide when able. CM continues to follow, will obtain [facility] information and update [facility] when able.

## 2021-12-19 LAB
GLUCOSE BLD STRIP.AUTO-MCNC: 111 MG/DL (ref 65–117)
GLUCOSE BLD STRIP.AUTO-MCNC: 119 MG/DL (ref 65–117)
GLUCOSE BLD STRIP.AUTO-MCNC: 126 MG/DL (ref 65–117)
GLUCOSE BLD STRIP.AUTO-MCNC: 86 MG/DL (ref 65–117)
GLUCOSE BLD STRIP.AUTO-MCNC: 98 MG/DL (ref 65–117)
PERFORMED BY, TECHID: ABNORMAL
PERFORMED BY, TECHID: ABNORMAL
PERFORMED BY, TECHID: NORMAL

## 2021-12-19 PROCEDURE — 77010033678 HC OXYGEN DAILY

## 2021-12-19 PROCEDURE — 65610000006 HC RM INTENSIVE CARE

## 2021-12-19 PROCEDURE — 82962 GLUCOSE BLOOD TEST: CPT

## 2021-12-19 PROCEDURE — 74011250637 HC RX REV CODE- 250/637: Performed by: HOSPITALIST

## 2021-12-19 PROCEDURE — 74011250636 HC RX REV CODE- 250/636: Performed by: HOSPITALIST

## 2021-12-19 PROCEDURE — 74011250636 HC RX REV CODE- 250/636: Performed by: INTERNAL MEDICINE

## 2021-12-19 PROCEDURE — 99232 SBSQ HOSP IP/OBS MODERATE 35: CPT | Performed by: INTERNAL MEDICINE

## 2021-12-19 PROCEDURE — 74011000250 HC RX REV CODE- 250: Performed by: HOSPITALIST

## 2021-12-19 PROCEDURE — 92526 ORAL FUNCTION THERAPY: CPT

## 2021-12-19 PROCEDURE — 74011000258 HC RX REV CODE- 258: Performed by: HOSPITALIST

## 2021-12-19 RX ORDER — SODIUM CHLORIDE 9 MG/ML
75 INJECTION, SOLUTION INTRAVENOUS CONTINUOUS
Status: DISCONTINUED | OUTPATIENT
Start: 2021-12-19 | End: 2022-01-07

## 2021-12-19 RX ADMIN — PIPERACILLIN AND TAZOBACTAM 3.38 G: 3; .375 INJECTION, POWDER, LYOPHILIZED, FOR SOLUTION INTRAVENOUS at 21:09

## 2021-12-19 RX ADMIN — LACTULOSE 15 ML: 10 SOLUTION ORAL at 11:18

## 2021-12-19 RX ADMIN — LEVETIRACETAM 1000 MG: 10 INJECTION, SOLUTION INTRAVENOUS at 09:46

## 2021-12-19 RX ADMIN — Medication 10 ML: at 13:47

## 2021-12-19 RX ADMIN — SODIUM CHLORIDE 1000 ML: 9 INJECTION, SOLUTION INTRAVENOUS at 08:55

## 2021-12-19 RX ADMIN — LAMOTRIGINE 300 MG: 100 TABLET ORAL at 21:56

## 2021-12-19 RX ADMIN — LEVETIRACETAM 1000 MG: 10 INJECTION, SOLUTION INTRAVENOUS at 21:16

## 2021-12-19 RX ADMIN — PIPERACILLIN AND TAZOBACTAM 3.38 G: 3; .375 INJECTION, POWDER, LYOPHILIZED, FOR SOLUTION INTRAVENOUS at 13:47

## 2021-12-19 RX ADMIN — SODIUM CHLORIDE 75 ML/HR: 9 INJECTION, SOLUTION INTRAVENOUS at 10:26

## 2021-12-19 RX ADMIN — ACETAMINOPHEN 650 MG: 325 TABLET ORAL at 21:55

## 2021-12-19 RX ADMIN — ACETAMINOPHEN 650 MG: 650 SUPPOSITORY RECTAL at 16:02

## 2021-12-19 RX ADMIN — Medication 5 ML: at 21:10

## 2021-12-19 RX ADMIN — KETOTIFEN FUMARATE 1 DROP: 0.35 SOLUTION/ DROPS OPHTHALMIC at 10:29

## 2021-12-19 RX ADMIN — Medication 6 MCG/MIN: at 08:54

## 2021-12-19 RX ADMIN — ONDANSETRON 4 MG: 2 INJECTION INTRAMUSCULAR; INTRAVENOUS at 21:17

## 2021-12-19 RX ADMIN — SODIUM CHLORIDE 75 ML/HR: 9 INJECTION, SOLUTION INTRAVENOUS at 21:10

## 2021-12-19 RX ADMIN — PSYLLIUM HUSK 1 PACKET: 3.4 POWDER ORAL at 09:51

## 2021-12-19 RX ADMIN — KETOTIFEN FUMARATE 1 DROP: 0.35 SOLUTION/ DROPS OPHTHALMIC at 21:54

## 2021-12-19 RX ADMIN — VANCOMYCIN HYDROCHLORIDE 1000 MG: 1 INJECTION, POWDER, LYOPHILIZED, FOR SOLUTION INTRAVENOUS at 10:29

## 2021-12-19 RX ADMIN — SODIUM CHLORIDE 500 ML: 9 INJECTION, SOLUTION INTRAVENOUS at 18:00

## 2021-12-19 RX ADMIN — CETIRIZINE HYDROCHLORIDE 10 MG: 10 TABLET, FILM COATED ORAL at 09:51

## 2021-12-19 RX ADMIN — FOLIC ACID 1 MG: 1 TABLET ORAL at 09:51

## 2021-12-19 RX ADMIN — ENOXAPARIN SODIUM 40 MG: 100 INJECTION SUBCUTANEOUS at 21:21

## 2021-12-19 RX ADMIN — LAMOTRIGINE 300 MG: 100 TABLET ORAL at 09:51

## 2021-12-19 RX ADMIN — PHENYTOIN 100 MG: 125 SUSPENSION ORAL at 09:51

## 2021-12-19 NOTE — PROGRESS NOTES
Progress Note    Patient: Eugene Cox MRN: [id number]  SSN: [id number]    YOB: 1975  Age: 55 y.o. Sex: male      Admit Date: 12/17/2021    LOS: 2 days     Subjective:     55 y.o. male with flexion contractures and bedridden status requiring total care but with oral intake with soft diet, intellectual developmental delay, seizure disorder brought to the emergency room by the caregivers from the group home as patient was not eating and drinking for 4 days. He did not also have any bowel movement, was given enema with not much success. He started having vomiting, several times but unable to get more information from them. He is found with a temperature on arrival to the emergency room and hypertension tachycardia meeting sepsis criteria. Chest x-ray with suspected aspiration pneumonia, CT of the abdomen pelvis done was showing some nonspecific abnormalities but no significant intra-abdominal pathology. Spoke to mother and discussed plan with her  Had a seizure episode      Review of Systems:  Unable to deteremine s/t mental status      Objective:     Vitals:    12/19/21 1100 12/19/21 1200 12/19/21 1300 12/19/21 1400   BP: 98/60 (!) 96/57 (!) 83/57 (!) 93/56   Pulse: 87 86 95 94   Resp:       Temp:  99 °F (37.2 °C)     SpO2:       Weight:       Height:            Intake and Output:  Current Shift: 12/19 0701 - 12/19 1900  In: 2214.5 [I.V.:2214.5]  Out: 1800 [Urine:1800]  Last three shifts: 12/17 1901 - 12/19 0700  In: 540 [P.O.:540]  Out: 300 [Urine:300]      Physical Exam:   General : Open eyes to the commands but unable to stay awake. Well-built,  HEENT : PERRLA, dry oral mucosa, atraumatic normocephalic, Normal ear and nose. Neck : Supple, no JVD, no masses noted, no carotid bruit. Lungs : Breath sounds with moderate air entry bilaterally, rhonchi noted, not using accessory muscles to breathe. CVS : Rhythm rate regular, S1+, S2+, tachycardia noted.   No murmur or gallop. Abdomen : Soft, nontender, no  bowel sounds active. Extremities : Upper extremities wrist flexion contractures, lower extremities difficult to move. .  Musculoskeletal : Increased muscle tone power with contracture abnormalities. Skin : Dry, poor skin turgor. no pathological rash. Lymphatic : No cervical lymphadenopathy. Neurological : Unable to evaluate   psychiatric :  unable to evaluate. Lab/Data Review:  Recent Results (from the past 24 hour(s))   GLUCOSE, POC    Collection Time: 12/18/21  9:40 PM   Result Value Ref Range    Glucose (POC) 124 (H) 65 - 117 mg/dL    Performed by [address] [place] [address], POC    Collection Time: 12/19/21  6:15 AM   Result Value Ref Range    Glucose (POC) 119 (H) 65 - 117 mg/dL    Performed by Hailee Braun [address], POC    Collection Time: 12/19/21  7:38 AM   Result Value Ref Range    Glucose (POC) 111 65 - 117 mg/dL    Performed by Jaylin Jones    GLUCOSE, POC    Collection Time: 12/19/21 11:18 AM   Result Value Ref Range    Glucose (POC) 98 65 - 117 mg/dL    Performed by Jaylin Jones          Assessment and plan:      (1) acute hypoxic respiratory failure: 3L NC.  Wean to keep saturation     (2) septic shock: on levophed, weaned : IVF, vanc and zosyn    (3) Aspiration pneumonia : speech, follow speech recommendations    (4) seizure disorder: resume home dose, most liekly had seizure, another epoisode while inpatient,.     (5) intellectual disability and quadriplegia: complicates all aspects ofc are    DVT ppx: lovenox     DISPO: will eventually go home once room air and seen by neuro    Spoke to mother    Signed By: Audi Mock MD     December 19, 2021

## 2021-12-19 NOTE — PROGRESS NOTES
Seizure noted at 1000 lasting 10-15  seconds, Keppra running at time of seizure, vitals stable, mother at bedside. 1010:  Resting comfortable in bed, responding to verbal stimulus, no distress, vitals stable.  No seizure activity noted

## 2021-12-19 NOTE — PROGRESS NOTES
Infectious Disease Progress Note               Subjective:   Stable, more alert, nonverbal, mother at bedside, concerned about leg swelling, notes a h/o DVT   Remains afebrile and off pressor support   Objective:   Physical Exam:     Visit Vitals  BP (!) 93/56   Pulse 92   Temp 99 °F (37.2 °C)   Resp 21   Ht 5' (1.524 m)   Wt 130 lb (59 kg)   SpO2 96%   BMI 25.39 kg/m²    O2 Flow Rate (L/min): 3 l/min O2 Device: Nasal cannula    Temp (24hrs), Av.6 °F (37.6 °C), Min:99 °F (37.2 °C), Max:100.2 °F (37.9 °C)     07 - 1900  In: 2214.5 [I.V.:2214.5]  Out: 1800 [Urine:1800]   1901 -  0700  In: 540 [P.O.:540]  Out: 300 [Urine:300]    General: NAD, alert, non-verbal   HEENT: ALEJO, Moist mucosa   Lungs: CTA b/l, decreased at the bases, no wheeze/rhonchi   Heart: S1S2+, RRR, no murmur  Abdo: Soft, NT, ND, +BS   Exts: Contracted exts, some leg swelling   Skin: No wounds, No rashes or lesions    Data Review:       Recent Days:  Recent Labs     21  0503 21  1058   WBC 11.5* 10.3   HGB 10.3* 12.7   HCT 33.8* 39.7    351     Recent Labs     21  0503 21  1210   BUN 11 11   CREA 0.83 0.84       No results found for: CRPQN, CRP, CRPM       Microbiology     Results     Procedure Component Value Units Date/Time    CULTURE, BLOOD [[id number]] Collected: 21 1100    Order Status: Completed Specimen: Blood Updated: 21 1338     Special Requests: No Special Requests        Culture result: No growth after 22 hours       MRSA SCREEN - PCR (NASAL) [[id number]]     Order Status: Sent     CULTURE, SPUTUM/BRONCH/OTH [[id number]]     Order Status: Sent Specimen: Sputum     COVID-19 RAPID TEST [[id number]] Collected: 21 1620    Order Status: Completed Specimen: Nasopharyngeal Updated: 21 1740     Specimen source Nasopharyngeal        COVID-19 rapid test Not Detected        Comment: Rapid Abbott ID Now   Rapid NAAT:  The specimen is NEGATIVE for SARS-CoV-2, the novel coronavirus associated with COVID-19. Negative results should be treated as presumptive and, if inconsistent with clinical signs and symptoms or necessary for patient management, should be tested with an alternative molecular assay. Negative results do not preclude SARS-CoV-2 infection and should not be used as the sole basis for patient management decisions. This test has been authorized by the FDA under   an Emergency Use Authorization (EUA) for use by authorized laboratories. Fact sheet for Healthcare Providers: ConventionUpdate.co.nz Fact sheet for Patients: ConventionUpdate.co.nz   Methodology: Isothermal Nucleic Acid Amplification                  Diagnostics   CXR Results  (Last 48 hours)    None             Assessment/Plan     1. Sepsis due to aspiration PNA w b/l patchy infiltrates on CXR       Afebrile, routine labs not done today       Blood Cx from 12/18 is pending       Continue on empiric Vanc and Zosyn       Routine labs and CXR in the morning      2. Poor oral intake for a few days PTP: Being followed by speech therapist      3. H/o seizure d/o, started at 10 months old per mother at bedside      No witnessed seizures since admit, anti-epileptics adjusted by neurology      4. Intellectual disability, quadriplegic w contracted ext    5.  B/l leg swelling, mother concerned, reports a h/o DVT, venous doppler ordered per request     Venkat Forte MD    12/19/2021

## 2021-12-19 NOTE — PROGRESS NOTES
Multiple attempts made to 5th floor to call report, unable to give report at this time, nurse not returning call and or no answer; Will continue to try.

## 2021-12-20 ENCOUNTER — APPOINTMENT (OUTPATIENT)
Dept: NON INVASIVE DIAGNOSTICS | Age: 46
DRG: 720 | End: 2021-12-20
Attending: INTERNAL MEDICINE
Payer: MEDICAID

## 2021-12-20 ENCOUNTER — APPOINTMENT (OUTPATIENT)
Dept: GENERAL RADIOLOGY | Age: 46
DRG: 720 | End: 2021-12-20
Attending: INTERNAL MEDICINE
Payer: MEDICAID

## 2021-12-20 LAB
BASOPHILS # BLD: 0 K/UL (ref 0–0.1)
BASOPHILS NFR BLD: 1 % (ref 0–1)
CRP SERPL-MCNC: 6.44 MG/DL (ref 0–0.6)
DATE LAST DOSE: ABNORMAL
DATE LAST DOSE: NORMAL
DIFFERENTIAL METHOD BLD: ABNORMAL
EOSINOPHIL # BLD: 0.6 K/UL (ref 0–0.4)
EOSINOPHIL NFR BLD: 9 % (ref 0–7)
ERYTHROCYTE [DISTWIDTH] IN BLOOD BY AUTOMATED COUNT: 12.8 % (ref 11.5–14.5)
GLUCOSE BLD STRIP.AUTO-MCNC: 112 MG/DL (ref 65–117)
GLUCOSE BLD STRIP.AUTO-MCNC: 86 MG/DL (ref 65–117)
GLUCOSE BLD STRIP.AUTO-MCNC: 93 MG/DL (ref 65–117)
HCT VFR BLD AUTO: 31.1 % (ref 36.6–50.3)
HGB BLD-MCNC: 10 G/DL (ref 12.1–17)
IMM GRANULOCYTES # BLD AUTO: 0 K/UL (ref 0–0.04)
IMM GRANULOCYTES NFR BLD AUTO: 0 % (ref 0–0.5)
LYMPHOCYTES # BLD: 1.5 K/UL (ref 0.8–3.5)
LYMPHOCYTES NFR BLD: 22 % (ref 12–49)
MCH RBC QN AUTO: 31 PG (ref 26–34)
MCHC RBC AUTO-ENTMCNC: 32.2 G/DL (ref 30–36.5)
MCV RBC AUTO: 96.3 FL (ref 80–99)
MONOCYTES # BLD: 0.6 K/UL (ref 0–1)
MONOCYTES NFR BLD: 9 % (ref 5–13)
NEUTS SEG # BLD: 3.9 K/UL (ref 1.8–8)
NEUTS SEG NFR BLD: 59 % (ref 32–75)
NRBC # BLD: 0 K/UL (ref 0–0.01)
NRBC BLD-RTO: 0 PER 100 WBC
PERFORMED BY, TECHID: NORMAL
PHENYTOIN SERPL-MCNC: 5.6 UG/ML (ref 10–20)
PLATELET # BLD AUTO: 265 K/UL (ref 150–400)
PMV BLD AUTO: 9.6 FL (ref 8.9–12.9)
RBC # BLD AUTO: 3.23 M/UL (ref 4.1–5.7)
REPORTED DOSE,DOSE: ABNORMAL UNITS
REPORTED DOSE,DOSE: NORMAL UNITS
VANCOMYCIN SERPL-MCNC: 11 UG/ML
WBC # BLD AUTO: 6.6 K/UL (ref 4.1–11.1)

## 2021-12-20 PROCEDURE — 74011250636 HC RX REV CODE- 250/636: Performed by: STUDENT IN AN ORGANIZED HEALTH CARE EDUCATION/TRAINING PROGRAM

## 2021-12-20 PROCEDURE — 74011250637 HC RX REV CODE- 250/637: Performed by: HOSPITALIST

## 2021-12-20 PROCEDURE — 65270000029 HC RM PRIVATE

## 2021-12-20 PROCEDURE — 74011000258 HC RX REV CODE- 258: Performed by: HOSPITALIST

## 2021-12-20 PROCEDURE — 74011250636 HC RX REV CODE- 250/636: Performed by: HOSPITALIST

## 2021-12-20 PROCEDURE — 80202 ASSAY OF VANCOMYCIN: CPT

## 2021-12-20 PROCEDURE — 92526 ORAL FUNCTION THERAPY: CPT

## 2021-12-20 PROCEDURE — 85025 COMPLETE CBC W/AUTO DIFF WBC: CPT

## 2021-12-20 PROCEDURE — 86140 C-REACTIVE PROTEIN: CPT

## 2021-12-20 PROCEDURE — 82962 GLUCOSE BLOOD TEST: CPT

## 2021-12-20 PROCEDURE — 80185 ASSAY OF PHENYTOIN TOTAL: CPT

## 2021-12-20 PROCEDURE — 36415 COLL VENOUS BLD VENIPUNCTURE: CPT

## 2021-12-20 PROCEDURE — 93970 EXTREMITY STUDY: CPT

## 2021-12-20 PROCEDURE — 71045 X-RAY EXAM CHEST 1 VIEW: CPT

## 2021-12-20 PROCEDURE — 74011250636 HC RX REV CODE- 250/636: Performed by: INTERNAL MEDICINE

## 2021-12-20 PROCEDURE — 99232 SBSQ HOSP IP/OBS MODERATE 35: CPT | Performed by: INTERNAL MEDICINE

## 2021-12-20 RX ORDER — ENOXAPARIN SODIUM 100 MG/ML
60 INJECTION SUBCUTANEOUS EVERY 12 HOURS
Status: DISCONTINUED | OUTPATIENT
Start: 2021-12-20 | End: 2021-12-20

## 2021-12-20 RX ADMIN — PHENYTOIN 100 MG: 125 SUSPENSION ORAL at 10:46

## 2021-12-20 RX ADMIN — KETOTIFEN FUMARATE 1 DROP: 0.35 SOLUTION/ DROPS OPHTHALMIC at 10:47

## 2021-12-20 RX ADMIN — PHENYTOIN 130 MG: 125 SUSPENSION ORAL at 02:26

## 2021-12-20 RX ADMIN — LEVETIRACETAM 1000 MG: 10 INJECTION, SOLUTION INTRAVENOUS at 10:45

## 2021-12-20 RX ADMIN — KETOTIFEN FUMARATE 1 DROP: 0.35 SOLUTION/ DROPS OPHTHALMIC at 22:00

## 2021-12-20 RX ADMIN — APIXABAN 5 MG: 5 TABLET, FILM COATED ORAL at 21:55

## 2021-12-20 RX ADMIN — LEVETIRACETAM 1000 MG: 10 INJECTION, SOLUTION INTRAVENOUS at 21:56

## 2021-12-20 RX ADMIN — PIPERACILLIN AND TAZOBACTAM 3.38 G: 3; .375 INJECTION, POWDER, LYOPHILIZED, FOR SOLUTION INTRAVENOUS at 21:57

## 2021-12-20 RX ADMIN — SODIUM CHLORIDE 1000 ML: 9 INJECTION, SOLUTION INTRAVENOUS at 17:00

## 2021-12-20 RX ADMIN — Medication 10 ML: at 22:01

## 2021-12-20 RX ADMIN — VANCOMYCIN HYDROCHLORIDE 1000 MG: 1 INJECTION, POWDER, LYOPHILIZED, FOR SOLUTION INTRAVENOUS at 02:13

## 2021-12-20 RX ADMIN — LAMOTRIGINE 300 MG: 100 TABLET ORAL at 21:56

## 2021-12-20 RX ADMIN — SODIUM CHLORIDE 1000 ML: 9 INJECTION, SOLUTION INTRAVENOUS at 16:52

## 2021-12-20 RX ADMIN — PIPERACILLIN AND TAZOBACTAM 3.38 G: 3; .375 INJECTION, POWDER, LYOPHILIZED, FOR SOLUTION INTRAVENOUS at 05:03

## 2021-12-20 RX ADMIN — PIPERACILLIN AND TAZOBACTAM 3.38 G: 3; .375 INJECTION, POWDER, LYOPHILIZED, FOR SOLUTION INTRAVENOUS at 15:03

## 2021-12-20 RX ADMIN — Medication 10 ML: at 05:11

## 2021-12-20 RX ADMIN — Medication 10 ML: at 15:03

## 2021-12-20 RX ADMIN — VANCOMYCIN HYDROCHLORIDE 1000 MG: 1 INJECTION, POWDER, LYOPHILIZED, FOR SOLUTION INTRAVENOUS at 12:32

## 2021-12-20 RX ADMIN — PHENYTOIN 130 MG: 125 SUSPENSION ORAL at 22:00

## 2021-12-20 RX ADMIN — ENOXAPARIN SODIUM 60 MG: 60 INJECTION SUBCUTANEOUS at 12:32

## 2021-12-20 RX ADMIN — LAMOTRIGINE 300 MG: 100 TABLET ORAL at 10:46

## 2021-12-20 NOTE — PROGRESS NOTES
Day # 3 of Vancomycin    Consult provided for this 55 y.o. male for indication of suspected aspiration pneumonia, sepsis.   Antibiotic regimen:  Vancomycin + Zosyn  Concomitant nephrotoxic drugs: NE  Frequency of BMP: Not scheduled    Recent Labs     21  0500 21  0503   WBC 6.6 11.5*   CREA  --  0.83   BUN  --  11     Est CrCl: ~75-80 ml/min  Temp (24hrs), Av.6 °F (37.6 °C), Min:98.7 °F (37.1 °C), Max:100.4 °F (38 °C)    Cultures:   None    MRSA Swab: Pending    Target range: AUC/STANLEY 400-600   Trough level today is at 11.0    Assessment/Plan:   Febrile, tachycardic, hypotensive, tachypnea  Will continue with same  therapy at 1000 mg q12h for a predicted AUC of 425  Will check a trough prior to dose  @ 1100  Tentative antimicrobial stop date  (7 day duration)

## 2021-12-20 NOTE — PROGRESS NOTES
Infectious Disease Progress Note               Subjective:   Pt family bedside, doing well, denies new complaints, remains off pressor support, no febrile episodes. His Doppler of left leg positive for DVT.  He remains in the ICU awaiting bed assignment for transfer to the floors   Objective:   Physical Exam:     Visit Vitals  BP (!) 89/58 (BP 1 Location: Left lower arm, BP Patient Position: At rest)   Pulse 86   Temp 99.2 °F (37.3 °C)   Resp 16   Ht 5' (1.524 m)   Wt 130 lb (59 kg)   SpO2 93%   BMI 25.39 kg/m²    O2 Flow Rate (L/min): 2 l/min O2 Device: Nasal cannula    Temp (24hrs), Av.6 °F (37.6 °C), Min:98.7 °F (37.1 °C), Max:100.4 °F (38 °C)    701 - 1900  In: -   Out: 2700 [Urine:2700]   1901 -  0700  In: 2659.7 [I.V.:2659.7]  Out: 3450 [[facility] Health[facility]:4753]    General: NAD, alert, non-verbal   HEENT: ALEJO, Moist mucosa, left sclera erythema   Lungs: CTA b/l, decreased at the bases, no wheeze/rhonchi   Heart: S1S2+, RRR, no murmur  Abdo: Soft, NT, ND, +BS   Exts: Contracted exts, some leg swelling   Skin: No wounds, No rashes or lesions    Data Review:       Recent Days:  Recent Labs     21  0500 21  0503   WBC 6.6 11.5*   HGB 10.0* 10.3*   HCT 31.1* 33.8*    265     Recent Labs     21  0503   BUN 11   CREA 0.83       Lab Results   Component Value Date/Time    C-Reactive protein 6.44 (H) 2021 05:00 AM          Microbiology     Results     Procedure Component Value Units Date/Time    CULTURE, BLOOD [[id number]] Collected: 21 1100    Order Status: Completed Specimen: Blood Updated: 21 09     Special Requests: No Special Requests        Culture result: No growth 2 days       MRSA SCREEN - PCR (NASAL) [[id number]]     Order Status: Sent     CULTURE, SPUTUM/BRONCH/OTH [[id number]] Collected: 21 2115    Order Status: Canceled Specimen: Sputum     COVID-19 RAPID TEST [[id number]] Collected: 21 1620    Order Status: Completed Specimen: Nasopharyngeal Updated: 12/17/21 2100     Specimen source Nasopharyngeal        COVID-19 rapid test Not Detected        Comment: Rapid Abbott ID Now   Rapid NAAT:  The specimen is NEGATIVE for SARS-CoV-2, the novel coronavirus associated with COVID-19. Negative results should be treated as presumptive and, if inconsistent with clinical signs and symptoms or necessary for patient management, should be tested with an alternative molecular assay. Negative results do not preclude SARS-CoV-2 infection and should not be used as the sole basis for patient management decisions. This test has been authorized by the FDA under   an Emergency Use Authorization (EUA) for use by authorized laboratories. Fact sheet for Healthcare Providers: ConventionTherapeutic Monitoring Servicesdate.co.nz Fact sheet for Patients: imagoo.co.nz   Methodology: Isothermal Nucleic Acid Amplification                  Diagnostics   CXR Results  (Last 48 hours)               12/20/21 0210  XR CHEST PORT Final result    Impression:  No significant interval change. Narrative:  Chest, frontal view, 12/20/2021       History: Aspiration pneumonia. Comparison: Including chest 2/17/2021. Findings: Electronic device at the left hemithorax has a lead extending to the   left thoracic inlet. The cardiac silhouette is stable. The lungs are   underexpanded. Diffuse reticular nodular opacities in the lungs are not   significantly changed. No pleural effusion or pneumothorax identified. Left   costophrenic angle is incompletely imaged. The osseous structures are stable. Assessment/Plan     1.  Sepsis due to aspiration PNA w b/l patchy infiltrates on CXR       Afebrile, maintaining O2 sats on 2L which can easily be weaned off       Unchanged Diffuse reticular nodular opacities in the lungs on CXR       WBC normalized on todays labs, CRP 6.44      Continue on Zosyn, will d/c Vanc since no isolation of MRSA from Cxs       Routine labs in the morning      2. Poor oral intake for a few days PTP: Being followed by speech therapist      3. H/o seizure d/o, started at 10 months old per mother at bedside      No witnessed seizures since admit, anti-epileptics adjusted by neurology      4. Intellectual disability, quadriplegic w contracted ext    5. Acute left leg DVT, primary, Dr Eros Ceron, notified, started on Eliquis       H/o VT per mother, but does not appear to have been on anticoagulation as outpt    6.  Left sclera erythema: Start on Ketotifen eye drop, will monitor for improvement     Neida Salamanca MD    12/20/2021

## 2021-12-20 NOTE — CONSULTS
Neurology Consult Note    HPI  Mr. Beata Nielsen is a 55 y.o.  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 Pressure review, patient lives in a group home where his caretakers reported that he had not been eating or drinking like he usually does for a few days and has not been having any bowel movements. He reportedly has also had some vomiting as well. In the ED, patient was febrile, hypertensive, tachycardic and met sepsis criteria for which IV antibiotics were started. Chest x-ray revealed likely aspiration pneumonia. Neurology consulted for encephalopathy and possible seizure activity that may have brought on patient's current condition. No seizure activity was witnessed at his group home where during his hospitalization however. Home AEDs: Levetiracetam 1000 BID, Phenytoin 100/130, Lamotrigine 300 BID    Patient lethargic but awakens with minor vocal stimulation. Patient does not produce speak but does moan and doesn't follow any central peripheral commands. Patient has flexion contractures in the bilateral upper extremities and bilateral lower extremities. Patient seems to have a VNS generator in the left upper chest.      IMPRESSION  Mr. Beata Nielsen is a 55 y.o.  male with the above history presented with decreased PO intake, lack of bowel movements and vomiting. In the ED, patient was found to be septic with chest x-ray concerning for aspiration pneumonia. Patient was started on IV antibiotics and admitted. Neurology consulted for possible seizure activity that may have initially brought on all of his symptoms. No seizure activity was witnessed at his group home where during his hospitalization however. Etiology of patient's presentation may have indeed resulted from an unwitnessed seizure episode leading to aspiration pneumonia and subsequent sepsis.   However, given patient's medical history he likely has degree of intractable epilepsy so will hold off on changing his seizure medications at the current time. Will consider increasing seizure medications if patient is seen to have any breakthrough seizure activity on an inpatient basis. We will continue with home AEDs for now. Patient also has VNS in place. RECOMMENDATIONS        Possible Breakthrough Seizure  Intractable Epilepsy W/VNS In Place  Intellectual Disability  Quadriplegia  Associated: Sepsis d/t Aspiration PNA  -Q6Hr NeuroChecks  -Seizure Precautions  -Seizure Prophylaxis: Levetiracetam 1000 BID, Phenytoin 100/130, Lamotrigine 300 BID  -- will check phenytoin levels  --> We will consider adjusting AEDs if patient has witnessed seizures in the hospital  -STAT IV Lorazepam 2 mg with any clinical seizure activity lasting > 3 minutes and contact Neurology for further recommendations  -Management of metabolic/infectious derangements to referring teams    Please call back with questions. Review of Systems  Unable to fully ascertain d/t mental status    Physical/Neurological Exam  Cardiovascular: tachycardic at times  Pulmonary: On supplemental oxygen  Skin: warm and dry      Patient lethargic but awakens with minor vocal stimulation; does not follow any central peripheral commands  Patient moans in response to vocal and physical stimulation  Unable to test for language  Pupils react to light bilaterally; EOM Intact   Blink to threat inconsistent bilaterally  No facial droop   Motor: At least 2/5 throughout to pain stimulation  Flexion contractures throughout in the bilateral upper and lower extremities  No abnormal movements   Nor increased tone throughout    Past Medical History:   Diagnosis Date    Intellectual disability     Quadriplegia, unspecified (HCC)     Flexion contractures upper and lower extremities    Seizure disorder ([facility] [address]       History reviewed. No pertinent surgical history.   No Known Allergies  Family History   Family history unknown: Yes        Social Connections:     Frequency of Communication with Friends and Family: Not on file    Frequency of Social Gatherings with Friends and Family: Not on file    Attends Church Services: Not on file    Active Member of Clubs or Organizations: Not on file    Attends Club or Organization Meetings: Not on file    Marital Status: Not on file         Medications  Current Outpatient Medications   Medication Instructions    cetirizine (ZYRTEC) 10 mg, Oral, DAILY    folic acid (FOLVITE) 1 mg, Oral, DAILY    ketotifen (ZADITOR) 0.025 % (0.035 %) ophthalmic solution 1 Drop, Both Eyes, 2 TIMES DAILY    lactulose (CHRONULAC) 10 g, Oral, DAILY    lamoTRIgine (LAMICTAL) 300 mg, Oral, 2 TIMES DAILY    levETIRAcetam 1,000 mg, Oral, 2 TIMES DAILY    phenytoin ER (DILANTIN) 230 mg, Oral, DAILY, Schedule at noon     psyllium husk-aspartame (Natural Fiber Supplemnt,asprt,) 3.4 gram pwpk packet 1 Packet, Oral, DAILY       Current Facility-Administered Medications   Medication Dose Route Frequency    0.9% sodium chloride infusion  75 mL/hr IntraVENous CONTINUOUS    levETIRAcetam in saline (iso-os) (KEPPRA) infusion 1,000 mg  1,000 mg IntraVENous BID    acetaminophen (TYLENOL) suppository 650 mg  650 mg Rectal Q4H PRN    ondansetron (ZOFRAN) injection 4 mg  4 mg IntraVENous Q6H PRN    NOREPINephrine (LEVOPHED) 8 mg in 5% dextrose 250mL (32 mcg/mL) infusion  0.5-16 mcg/min IntraVENous TITRATE    Vancomycin - Pharmacy to Dose   Other Rx Dosing/Monitoring    vancomycin (VANCOCIN) 1,000 mg in 0.9% sodium chloride 250 mL (VIAL-MATE)  1,000 mg IntraVENous Q12H    phenytoin (DILANTIN) 100 mg/4 mL oral suspension 100 mg  100 mg Oral DAILY    phenytoin (DILANTIN) 100 mg/4 mL oral suspension 130 mg  130 mg Oral QHS    Vancomycin - Please draw trough prior to dose 12/20 @ 1100   Other ONCE    0.9% sodium chloride infusion 1,000 mL  1,000 mL IntraVENous CONTINUOUS    0.9% sodium chloride infusion 1,000 mL  1,000 mL IntraVENous CONTINUOUS    cetirizine (ZYRTEC) tablet 10 mg  10 mg Oral DAILY    folic acid (FOLVITE) tablet 1 mg  1 mg Oral DAILY    ketotifen (ZADITOR) 0.025 % (0.035 %) ophthalmic solution 1 Drop  1 Drop Both Eyes BID    lactulose (CHRONULAC) 10 gram/15 mL solution 15 mL  10 g Oral DAILY    lamoTRIgine (LaMICtal) tablet 300 mg  300 mg Oral BID    psyllium husk-aspartame (METAMUCIL FIBER) packet 1 Packet  1 Packet Oral DAILY    sodium chloride (NS) flush 5-40 mL  5-40 mL IntraVENous Q8H    sodium chloride (NS) flush 5-40 mL  5-40 mL IntraVENous PRN    acetaminophen (TYLENOL) tablet 650 mg  650 mg Oral Q4H PRN    albuterol (PROVENTIL HFA, VENTOLIN HFA, PROAIR HFA) inhaler 2 Puff  2 Puff Inhalation Q4H PRN    enoxaparin (LOVENOX) injection 40 mg  40 mg SubCUTAneous Q24H    piperacillin-tazobactam (ZOSYN) 3.375 g in 0.9% sodium chloride (MBP/ADV) 100 mL MBP  3.375 g IntraVENous Q8H        Objective  Temp:  [99 °F (37.2 °C)-103.1 °F (39.5 °C)]   Pulse (Heart Rate):  []   BP: ()/(47-86)   Resp Rate:  [16-26]   O2 Sat (%):  [87 %-98 %]   Weight:  [59 kg (130 lb)]     Intake/Output Summary (Last 24 hours) at 12/20/2021 0909  Last data filed at 12/20/2021 9278  Gross per 24 hour   Intake 2659.7 ml   Output 4450 ml   Net -1790.3 ml     Wt Readings from Last 3 Encounters:   12/17/21 59 kg (130 lb)        Labs  Recent Results (from the past 24 hour(s))   GLUCOSE, POC    Collection Time: 12/19/21 11:18 AM   Result Value Ref Range    Glucose (POC) 98 65 - 117 mg/dL    Performed by Peggy Ferraro    GLUCOSE, POC    Collection Time: 12/19/21  4:52 PM   Result Value Ref Range    Glucose (POC) 126 (H) 65 - 117 mg/dL    Performed by Peggy Ferraro    GLUCOSE, POC    Collection Time: 12/19/21  9:33 PM   Result Value Ref Range    Glucose (POC) 86 65 - 117 mg/dL    Performed by Teresa Unger    CBC WITH AUTOMATED DIFF    Collection Time: 12/20/21  5:00 AM   Result Value Ref Range    WBC 6.6 4.1 - 11.1 K/uL    RBC 3.23 (L) 4.10 - 5.70 M/uL    HGB 10.0 (L) 12.1 - 17.0 g/dL    HCT 31.1 (L) 36.6 - 50.3 %    MCV 96.3 80.0 - 99.0 FL    MCH 31.0 26.0 - 34.0 PG    MCHC 32.2 30.0 - 36.5 g/dL    RDW 12.8 11.5 - 14.5 %    PLATELET 597 835 - 625 K/uL    MPV 9.6 8.9 - 12.9 FL    NRBC 0.0 0.0  WBC    ABSOLUTE NRBC 0.00 0.00 - 0.01 K/uL    NEUTROPHILS 59 32 - 75 %    LYMPHOCYTES 22 12 - 49 %    MONOCYTES 9 5 - 13 %    EOSINOPHILS 9 (H) 0 - 7 %    BASOPHILS 1 0 - 1 %    IMMATURE GRANULOCYTES 0 0 - 0.5 %    ABS. NEUTROPHILS 3.9 1.8 - 8.0 K/UL    ABS. LYMPHOCYTES 1.5 0.8 - 3.5 K/UL    ABS. MONOCYTES 0.6 0.0 - 1.0 K/UL    ABS. EOSINOPHILS 0.6 (H) 0.0 - 0.4 K/UL    ABS. BASOPHILS 0.0 0.0 - 0.1 K/UL    ABS. IMM. GRANS. 0.0 0.00 - 0.04 K/UL    DF AUTOMATED     C REACTIVE PROTEIN, QT    Collection Time: 12/20/21  5:00 AM   Result Value Ref Range    C-Reactive protein 6.44 (H) 0.00 - 0.60 mg/dL   GLUCOSE, POC    Collection Time: 12/20/21  7:09 AM   Result Value Ref Range    Glucose (POC) 93 65 - 117 mg/dL    Performed by Esau Miller           Significant Diagnostic Studies  All images independently visualized    XR CHEST PORT   Final Result   No significant interval change. CT ABD PELV W CONT   Final Result      1. Small focus of intraluminal gas within the urinary bladder. Please correlate   for recent instrumentation or other causes of intraluminal gas, including an   infectious process. 2. Somewhat reticular nodular opacities in the lung bases, suboptimally   evaluated due to respiratory motion. Some of these have what appears to be a   tree and bud morphology, suggesting either chronic aspiration or an infectious   or inflammatory small airways disease. Otherwise a fibrotic process should be   considered. This would be better characterized with dedicated chest CT as   clinically warranted.    3. Underdistention versus circumferential wall thickening of the distal esophagus. 4. Probable left trochanteric bursitis with sterility of the fluid   indeterminate. 5. Probable myositis ossificans surrounding the left hip. Please correlate for   history of prior trauma. 6. Enlarged lymph nodes in the upper abdomen, nonspecific. Comparison with   outside imaging would be helpful, if available, to determine longer term   stability. Otherwise short interval follow-up in 3 months is recommended. XR CHEST SNGL V   Final Result   Patchy opacities within the lungs, nonspecific, but leading differential   considerations include infection or pulmonary edema. Superimposed acute disease   on an underlying chronic interstitial process is also possible. Radiographic   follow-up is recommended to exclude other causes of opacification. XR ABD (KUB)   Final Result   Mildly prominent gas-filled loops of bowel within the central abdomen with   overall nonobstructive bowel gas pattern. DUPLEX LOWER EXT VENOUS BILAT    (Results Pending)         This document has been prepared by the Dragon voice recognition system, typographical errors may have occurred.  Attempts have been made to correct errors, however inadvertent errors may persist.

## 2021-12-20 NOTE — PROGRESS NOTES
Problem: Dysphagia (Adult)  Goal: *Acute Goals and Plan of Care (Insert Text)  Description: Speech Therapy Goals  Initiated 12/18/2021  -Patient will participate in modified barium swallow study within 5 day(s), if medically necessary. [ ] Not met  [ ]  MET   [ ] Progressing  [ ] Sweetie Orourke  -Patient will tolerate clear liquid diet with nectar thick consistency without signs/symptoms of aspiration given minimal cues within 3 day(s). [ ] Not met  [ ]  MET   [ ] Progressing  [ ] Sweetie Orourke  -Patient will demonstrate understanding of swallow safety precautions and aspiration precautions, diet recs with moderate cues within 5  day(s). [ ] Not met  [ ]  MET   [ ] Progressing  [ ] Discontinue   Outcome: Sai Libel TREATMENT  Patient: Roberto Smith (49 y.o. male)  Date: 12/19/2021  Diagnosis: Aspiration pneumonia ([facility] [address] [J69.0] <principal problem not specified>       Precautions: Aspiration Precaution      ASSESSMENT:  The patient was positioned upright by SLP and nurse, NC 2L @ 92, with mom present. Nurse reported he hasn't been drinking the fluids that has been coming and mom reported he had a hard time taking his meds because he didn't want to take them. Mom mentioned that he takes his pills whole in chocolate pudding, the SLP shared the info with his nurse. The SLP provided iced toothette swabs within the cheeks to stimulate the lips for possible PO intake/assist with opening mouth. The SLP asked him if he wanted orange juice and he looked up as if he wanted orange juice. The SLP made the OJ a mildly thick consistency and he drank 4oz via straw without overt s/sx aspiration. X1 throat clearance was noted. Good HLE via digital palpation. The patient has been receiving the cherry Ray packs and is refusing. However, the SLP requested the Ensure High Protein from the kitchen and later had to go get the Ensure from the kitchen.  However, while waiting on the Ensure Ms. Marks asked if we could try some of the coke she brought for him. The SLP trialed the coke via straw and he was able to take x6 sips (3 consecutive sips each) before refusing more trials. No overt s/sx aspiration noted. The nurse came back to change the patient and the SLP went to check on the Ensure. Once returning the patient had been coughing, which may had been triggered by having to have his head lowered for changing. The patient has a strong cough. Coughs had subsided. The SLP brought back the Ensure. He accepted about 6oz of the Ensure without overt s/sx aspiration. He consistently will take 3 consecutive swallows (audible swallows) and SLP will have him break to clear an additional swallow. He refused remainder. He had no signs of distress. However, vitals were taken and he was noted to have a fever of 100.4. The patient was due to move to another room, but the MD agreed for him to stay in ICU because his BP was also running low. PLAN:  Recommendations and Planned Interventions:  Continue mildly thick clear liquid diet via straw. Staff to feed the client all meals. SLP to follow and trial upgraded PO textures when appropriate. Patient would benefit from [facility], but unsure of chair and contractures. Frequency/Duration: Patient will be followed by speech-language pathology daily to address goals. Discharge Recommendations: To Be Determined     SUBJECTIVE:   Patient stated n/a.     OBJECTIVE:   Cognitive and Communication Status:  Neurologic State: Beaulah Bilis open spontaneously  Orientation Level: Unable to verbalize  Cognition: No command following,Decreased attention/concentration         Pain:  Pain Scale 1: Adult Nonverbal Pain Scale          After treatment:   Patient left in no apparent distress in bed, Call bell within reach, Nursing notified, and Caregiver / family present    COMMUNICATION/EDUCATION:   Patient was educated regarding purpose of SLP assessment, POC, diet recs and sw safety precautions. Patient demonstrated [address] understanding as evidenced by facial expressions and nonverbal cues. The patient's plan of care including recommendations, planned interventions, and recommended diet changes were discussed with: Registered nurse and patient's mother.     Felecia Colon-Ball  Time Calculation: 60 mins

## 2021-12-20 NOTE — PROGRESS NOTES
CM reviewed clinical chart. Discharge plan is to return to his group home. CM team will continue to follow.

## 2021-12-20 NOTE — PROGRESS NOTES
TRANSFER - OUT REPORT:    Verbal report given to [place] (name) on Cole Richardson  being transferred to [address] [place] [address] room 526 door(unit) for routine progression of care       Report consisted of patients Situation, Background, Assessment and   Recommendations(SBAR). Information from the following report(s) SBAR, Intake/Output, MAR, Recent Results, Med Rec Status, Cardiac Rhythm NSR and Pre Procedure Checklist was reviewed with the receiving nurse. Lines:   Peripheral IV 12/17/21 Anterior;Proximal;Right Forearm (Active)   Site Assessment Clean, dry, & intact 12/20/21 0539   Phlebitis Assessment 0 12/19/21 1600   Infiltration Assessment 0 12/19/21 1600   Dressing Status Clean, dry, & intact 12/20/21 0539   Dressing Type Tape;Transparent 12/19/21 1600   Hub Color/Line Status Pink;Capped 12/19/21 1600   Action Taken Dressing reinforced 12/20/21 0539   Alcohol Cap Used No 12/20/21 0539       Peripheral IV 12/18/21 Distal;Posterior;Right Forearm (Active)   Site Assessment Clean, dry, & intact 12/19/21 1600   Phlebitis Assessment 0 12/19/21 1600   Infiltration Assessment 0 12/19/21 1600   Dressing Status Clean, dry, & intact 12/19/21 1600   Dressing Type Tape;Transparent 12/19/21 1600   Hub Color/Line Status Blue; Infusing 12/19/21 1600   Alcohol Cap Used Yes 12/18/21 1100       Peripheral IV 12/19/21 Left;Posterior Wrist (Active)        Opportunity for questions and clarification was provided.

## 2021-12-20 NOTE — PROGRESS NOTES
SPEECH LANGUAGE PATHOLOGY DYSPHAGIA TREATMENT  Patient: Tyrese Zuniga (89 y.o. male)  Date: 12/20/2021  Diagnosis: Aspiration pneumonia ([facility] [J69.0] <principal problem not specified>       Precautions: aspiration      ASSESSMENT:  Nurse and clinician repositioned patient. Legs did not appear fully contracted and was able to extend legs out in front of patient. Patient w/ natural head down and forward posture. He remains largely nonverbal. Needs encouragement to accept foods and liquids. Administered mildly thick ensure via straw. Patient w/ slow pull via straw. Prolonged oral transit w/ swallow delay. HLE and protraction adequate to palpation. Strong swallow to cervical ausculation. Trace oral residuals after the swallow. No clinical indicators of penetration or aspiration. Patient accepted puree ( applesauce ) trials from mother w/ improved bolus acceptance and showing hunger cues. Trace oral residual continues. No clinical indicators of penetration, aspiration observed. Patient able to take meds crushed in applesauce w/o difficulty. Prefers to drink mildly thick liquids via straw. Due to increased alertness and bolus control, recommend diet advancement. PLAN:  Recommendations and Planned Interventions:  Full liquid diet, mildly thick liquids. Straws ok. Patient likes ice cream. Please provide magic cup. Continue w/ ensure. Assist w/ intake. Feed only when alert. MBS as indicated. Patient continues to benefit from skilled intervention to address the above impairments. Continue treatment per established plan of care. Frequency/Duration: Patient will be followed by speech-language pathology 5 times a week to address goals. Discharge Recommendations:  Group home     SUBJECTIVE:   Patient seen at bedside. Mother and nurse present in room. Nurse presenting medications and concerned about swallowing and alertness.      OBJECTIVE:     CXR Results  (Last 48 hours)                 12/20/21 0210 XR CHEST PORT Final result    Impression:  No significant interval change. Narrative:  Chest, frontal view, 12/20/2021       History: Aspiration pneumonia. Comparison: Including chest 2/17/2021. Findings: Electronic device at the left hemithorax has a lead extending to the   left thoracic inlet. The cardiac silhouette is stable. The lungs are   underexpanded. Diffuse reticular nodular opacities in the lungs are not   significantly changed. No pleural effusion or pneumothorax identified. Left   costophrenic angle is incompletely imaged. The osseous structures are stable. CT Results  (Last 48 hours)      None           Cognitive and Communication Status:  Neurologic State: Drowsy,Sleeping  Orientation Level: Disoriented X4  Cognition: Decreased command following     Pain:  Pain Scale 1: Numeric (0 - 10)  Pain Intensity 1: 0       After treatment:   Patient left in no apparent distress in bed, Nursing notified and Caregiver / family present    COMMUNICATION/EDUCATION:   Patient was educated regarding his deficit(s) of dysphagia, swallow safety precautions, diet recs and POC. He demonstrated Guarded understanding as evidenced by mentation. The patient's plan of care including recommendations, planned interventions, and recommended diet changes were discussed with: Registered nurse. Layla Mena, SLP M.S. CCC-SLP  Time Calculation: 14 mins           Problem: Dysphagia (Adult)  Goal: *Acute Goals and Plan of Care (Insert Text)  Description: Speech Therapy Goals  Initiated 12/18/2021  -Patient will participate in modified barium swallow study within 5 day(s), if medically necessary. [ ] Not met  [ ]  MET   [ ] Progressing  [ ] Lena Howard  -Patient will tolerate clear liquid diet with nectar thick consistency without signs/symptoms of aspiration given minimal cues within 3 day(s).          [ ] Not met  [ Rafael Meyer  MET   [ ] Progressing  [ ] Lena Howard  -Patient will demonstrate understanding of swallow safety precautions and aspiration precautions, diet recs with moderate cues within 5  day(s).         [ ] Not met  [ ]  MET   [ ] Progressing  [ ] Discontinue   Outcome: Progressing Towards Goal

## 2021-12-20 NOTE — PROGRESS NOTES
2 pERSON SKIN CHECK DONE Justin Bhakta LPN. REDNESS/ RASH NOTED ON RUE.  NO PRESSURE INJURIES NOTED ON ADMISSION

## 2021-12-21 ENCOUNTER — APPOINTMENT (OUTPATIENT)
Dept: CT IMAGING | Age: 46
DRG: 720 | End: 2021-12-21
Attending: HOSPITALIST
Payer: MEDICAID

## 2021-12-21 PROBLEM — J96.91 RESPIRATORY FAILURE WITH HYPOXIA (HCC): Status: ACTIVE | Noted: 2021-12-21

## 2021-12-21 PROCEDURE — 65270000029 HC RM PRIVATE

## 2021-12-21 PROCEDURE — 74011250636 HC RX REV CODE- 250/636: Performed by: HOSPITALIST

## 2021-12-21 PROCEDURE — 94760 N-INVAS EAR/PLS OXIMETRY 1: CPT

## 2021-12-21 PROCEDURE — 74011000258 HC RX REV CODE- 258: Performed by: HOSPITALIST

## 2021-12-21 PROCEDURE — 74011000636 HC RX REV CODE- 636: Performed by: INTERNAL MEDICINE

## 2021-12-21 PROCEDURE — 74011250637 HC RX REV CODE- 250/637: Performed by: HOSPITALIST

## 2021-12-21 PROCEDURE — 99232 SBSQ HOSP IP/OBS MODERATE 35: CPT | Performed by: INTERNAL MEDICINE

## 2021-12-21 PROCEDURE — 71275 CT ANGIOGRAPHY CHEST: CPT

## 2021-12-21 RX ADMIN — APIXABAN 5 MG: 5 TABLET, FILM COATED ORAL at 09:19

## 2021-12-21 RX ADMIN — Medication 10 ML: at 16:58

## 2021-12-21 RX ADMIN — PHENYTOIN 130 MG: 125 SUSPENSION ORAL at 21:35

## 2021-12-21 RX ADMIN — Medication 10 ML: at 21:36

## 2021-12-21 RX ADMIN — PHENYTOIN 100 MG: 125 SUSPENSION ORAL at 10:34

## 2021-12-21 RX ADMIN — IOPAMIDOL 100 ML: 755 INJECTION, SOLUTION INTRAVENOUS at 15:39

## 2021-12-21 RX ADMIN — LACTULOSE 15 ML: 10 SOLUTION ORAL at 09:19

## 2021-12-21 RX ADMIN — APIXABAN 5 MG: 5 TABLET, FILM COATED ORAL at 21:34

## 2021-12-21 RX ADMIN — PIPERACILLIN AND TAZOBACTAM 3.38 G: 3; .375 INJECTION, POWDER, LYOPHILIZED, FOR SOLUTION INTRAVENOUS at 16:58

## 2021-12-21 RX ADMIN — LEVETIRACETAM 1000 MG: 10 INJECTION, SOLUTION INTRAVENOUS at 09:18

## 2021-12-21 RX ADMIN — LAMOTRIGINE 300 MG: 100 TABLET ORAL at 21:35

## 2021-12-21 RX ADMIN — Medication 10 ML: at 05:41

## 2021-12-21 RX ADMIN — PIPERACILLIN AND TAZOBACTAM 3.38 G: 3; .375 INJECTION, POWDER, LYOPHILIZED, FOR SOLUTION INTRAVENOUS at 21:36

## 2021-12-21 RX ADMIN — PSYLLIUM HUSK 1 PACKET: 3.4 POWDER ORAL at 09:19

## 2021-12-21 RX ADMIN — KETOTIFEN FUMARATE 1 DROP: 0.35 SOLUTION/ DROPS OPHTHALMIC at 23:42

## 2021-12-21 RX ADMIN — LEVETIRACETAM 1000 MG: 10 INJECTION, SOLUTION INTRAVENOUS at 21:35

## 2021-12-21 RX ADMIN — PIPERACILLIN AND TAZOBACTAM 3.38 G: 3; .375 INJECTION, POWDER, LYOPHILIZED, FOR SOLUTION INTRAVENOUS at 05:40

## 2021-12-21 RX ADMIN — FOLIC ACID 1 MG: 1 TABLET ORAL at 09:19

## 2021-12-21 RX ADMIN — LAMOTRIGINE 300 MG: 100 TABLET ORAL at 09:19

## 2021-12-21 RX ADMIN — CETIRIZINE HYDROCHLORIDE 10 MG: 10 TABLET, FILM COATED ORAL at 09:00

## 2021-12-21 RX ADMIN — KETOTIFEN FUMARATE 1 DROP: 0.35 SOLUTION/ DROPS OPHTHALMIC at 09:19

## 2021-12-21 NOTE — PROGRESS NOTES
Patient's mother Mey Molina) followed up about heparin a drip. Spoke with Dr. Rocael Portillo about the mother's concern. Provider stated he has started the patient on eliquis.

## 2021-12-21 NOTE — PROGRESS NOTES
Comprehensive Nutrition Assessment    Type and Reason for Visit: Initial      Nutrition Recommendations/Plan:   Diet advancement per SLP  Supplements as ordered- Ensure HP TID, magic cup with meals  Monitor weights, intake, diet adv, labs and skin changes  Document intake % and BM in I/O's        Nutrition Assessment:  Admit after not eating or drinking x 4 days with reported constipation and vomiting. Febrile, chest xray showing suspected aspiration PNA. Speech following with  progression of diet noted as alertness and bolus control improve. Decrease po reported. Additional supplementation in place. Likely will see improvement as condition improves. Labs: 12/20 GLUC 112, HGB 10.0, CRP 6.4412/18 , K 3.5, BUN 11, CRE . 69PLAS: IVF, Zosyn, folic acid, lactulose, lamictal, Dilantin, metamucil         Malnutrition Assessment:  Malnutrition Status:  Mild malnutrition    Context:  Acute illness     Findings of the 6 clinical characteristics of malnutrition:   Energy Intake:  1 - 75% or less of est energy req for 7 or more days  Weight Loss:  No significant weight loss     Body Fat Loss:  No significant body fat loss,     Muscle Mass Loss:  No significant muscle mass loss,    Fluid Accumulation:  No significant fluid accumulation,          Estimated Daily Nutrient Needs:  Energy (kcal): 1500kcl (25kcal/kg); Weight Used for Energy Requirements: Current  Protein (g): 60g (1.0g/kg); Weight Used for Protein Requirements: Current  Fluid (ml/day): 1500ml; Method Used for Fluid Requirements: 1 ml/kcal      Nutrition Related Findings:  NFPE without acute findings. Last BM loose 12/21, No n/v. No edema. +dysphagia      Wounds:    None       Current Nutrition Therapies:  ADULT ORAL NUTRITION SUPPLEMENT Breakfast, Lunch, Dinner; Other Supplement;  Ensure Plus  ADULT DIET Full Liquid; Mildly Thick (Nectar)    Anthropometric Measures:  · Height:  5' (152.4 cm)  · Current Body Wt:  59 kg (130 lb 1.1 oz)   · Admission Body Wt:  130 lb 1.1 oz    · Usual Body Wt:        · Ideal Body Wt:  106 lbs:  122.7 %   · Adjusted Body Weight:  146.3; Weight Adjustment for: Quadriplegia   · Adjusted BMI:  28.6    · BMI Category: Overweight (BMI 25.0-29. 9)       Nutrition Diagnosis:   · Swallowing difficulty related to swallowing difficulty as evidenced by intake 0-25%      Nutrition Interventions:   Food and/or Nutrient Delivery: Continue current diet,Continue oral nutrition supplement  Nutrition Education and Counseling: No recommendations at this time  Coordination of Nutrition Care: Continue to monitor while inpatient    Goals:  pt will consume >50% meals and supplement w/o s/sx aspiration, weight maintenance, regular BM's.        Nutrition Monitoring and Evaluation:   Behavioral-Environmental Outcomes: None identified  Food/Nutrient Intake Outcomes: Diet advancement/tolerance,Food and nutrient intake,Supplement intake  Physical Signs/Symptoms Outcomes: Chewing or swallowing,Weight    Discharge Planning:    No discharge needs at this time     Electronically signed by Sofia Madrigal RD on 12/21/2021 at 12:04 PM    Contact: [phone]

## 2021-12-21 NOTE — PROGRESS NOTES
Problem: Pressure Injury - Risk of  Goal: *Prevention of pressure injury  Description: Document Martin Scale and appropriate interventions in the flowsheet. 12/21/2021 0207 by Mikel Guerrier RN  Outcome: Progressing Towards Goal  Note: Pressure Injury Interventions:  Sensory Interventions: Assess changes in LOC    Moisture Interventions: Absorbent underpads    Activity Interventions: PT/OT evaluation    Mobility Interventions: Turn and reposition approx. every two hours(pillow and wedges)    Nutrition Interventions: Document food/fluid/supplement intake    Friction and Shear Interventions: Apply protective barrier, creams and emollients             12/21/2021 0641 by Mikel Guerrier RN  Outcome: Progressing Towards Goal  Note: Pressure Injury Interventions:  Sensory Interventions: Assess changes in LOC    Moisture Interventions: Absorbent underpads    Activity Interventions: PT/OT evaluation    Mobility Interventions: Turn and reposition approx.  every two hours(pillow and wedges)    Nutrition Interventions: Document food/fluid/supplement intake    Friction and Shear Interventions: Apply protective barrier, creams and emollients                Problem: Patient Education: Go to Patient Education Activity  Goal: Patient/Family Education  Outcome: Progressing Towards Goal

## 2021-12-21 NOTE — CONSULTS
Phenytoin levels subtherapeutic possibly from med-med interaction. Will not change dose at this time unless patient has breakthrough seizures.

## 2021-12-21 NOTE — PROGRESS NOTES
Problem: Pressure Injury - Risk of  Goal: *Prevention of pressure injury  Description: Document Martin Scale and appropriate interventions in the flowsheet. Outcome: Progressing Towards Goal  Note: Pressure Injury Interventions:  Sensory Interventions: Assess changes in LOC    Moisture Interventions: Absorbent underpads    Activity Interventions: PT/OT evaluation    Mobility Interventions: Turn and reposition approx. every two hours(pillow and wedges)    Nutrition Interventions: Document food/fluid/supplement intake    Friction and Shear Interventions: Apply protective barrier, creams and emollients                Problem: Falls - Risk of  Goal: *Absence of Falls  Description: Document Jossy Fall Risk and appropriate interventions in the flowsheet. Outcome: Progressing Towards Goal  Note: Fall Risk Interventions:       Mentation Interventions: Evaluate medications/consider consulting pharmacy    Medication Interventions: Evaluate medications/consider consulting pharmacy    Elimination Interventions:  Toileting schedule/hourly rounds

## 2021-12-21 NOTE — PROGRESS NOTES
Infectious Disease Progress Note           Subjective:   Stable mother at bedside, no acute events since last seen, no acute events since last seen, erythematous rash noted on right antecubital fossa and groin folds. Had a long discussion w his mom at bedside and he is in favor of G tube placement but will like to discuss pros and cons w GI before proceeding   Objective:   Physical Exam:     Visit Vitals  /73   Pulse 90   Temp 98.6 °F (37 °C)   Resp 19   Ht 5' (1.524 m)   Wt 130 lb (59 kg)   SpO2 93%   BMI 25.39 kg/m²    O2 Flow Rate (L/min): 0 l/min O2 Device: None (Room air)    Temp (24hrs), Av.5 °F (36.9 °C), Min:98.3 °F (36.8 °C), Max:98.8 °F (37.1 °C)    No intake/output data recorded.  1901 -  0700  In: -   Out: 3701 [Urine:3700]    General: NAD, alert, non-verbal   HEENT: ALEJO, Moist mucosa, left sclera erythema   Lungs: CTA b/l, decreased at the bases, no wheeze/rhonchi   Heart: S1S2+, RRR, no murmur  Abdo: Soft, NT, ND, +BS   Exts: Contracted exts, some leg swelling   Skin: No wounds, No rashes or lesions    Data Review:       Recent Days:  Recent Labs     21  0500   WBC 6.6   HGB 10.0*   HCT 31.1*        No results for input(s): BUN, CREA in the last 72 hours.     Lab Results   Component Value Date/Time    C-Reactive protein 6.44 (H) 2021 05:00 AM          Microbiology     Results     Procedure Component Value Units Date/Time    MRSA SCREEN - PCR (NASAL) [[id number]] Collected: 21 1100    Order Status: Canceled Specimen: Swab     CULTURE, BLOOD [[id number]] Collected: 21 1100    Order Status: Completed Specimen: Blood Updated: 21 0904     Special Requests: No Special Requests        Culture result: No growth 2 days       CULTURE, SPUTUM/BRONCH/OTH [[id number]] Collected: 21    Order Status: Canceled Specimen: Sputum     COVID-19 RAPID TEST [[id number]] Collected: 21 1620    Order Status: Completed Specimen: Nasopharyngeal Updated: 12/17/21 1740     Specimen source Nasopharyngeal        COVID-19 rapid test Not Detected        Comment: Rapid Abbott ID Now   Rapid NAAT:  The specimen is NEGATIVE for SARS-CoV-2, the novel coronavirus associated with COVID-19. Negative results should be treated as presumptive and, if inconsistent with clinical signs and symptoms or necessary for patient management, should be tested with an alternative molecular assay. Negative results do not preclude SARS-CoV-2 infection and should not be used as the sole basis for patient management decisions. This test has been authorized by the FDA under   an Emergency Use Authorization (EUA) for use by authorized laboratories. Fact sheet for Healthcare Providers: [employer] Fact sheet for Patients: [employer]   Methodology: Isothermal Nucleic Acid Amplification             Diagnostics   CXR Results  (Last 48 hours)               12/20/21 0210  XR CHEST PORT Final result    Impression:  No significant interval change. Narrative:  Chest, frontal view, 12/20/2021       History: Aspiration pneumonia. Comparison: Including chest 2/17/2021. Findings: Electronic device at the left hemithorax has a lead extending to the   left thoracic inlet. The cardiac silhouette is stable. The lungs are   underexpanded. Diffuse reticular nodular opacities in the lungs are not   significantly changed. No pleural effusion or pneumothorax identified. Left   costophrenic angle is incompletely imaged. The osseous structures are stable. Assessment/Plan     1. Sepsis due to aspiration PNA w b/l patchy infiltrates on CXR       Off supplemental O2, maintaining O2 sats on RA, no increased oral secretions      On day # 4 of empiric Zosyn, routine labs in the morning      2.  Poor oral intake for a few days PTP: Mother leaning toward peg tube placement       GI consult in AM to discuss pros and cons of peg tube placement per mothers request       Needs assistance w feeding, RN notified      3. H/o seizure d/o, Continue anti-epileptics, being followed by neurology     4. Intellectual disability, quadriplegic w contracted ext    5. Acute left leg DVT, anticoagulated on Eliquis     6.  Left sclera erythema: Still present, continue on Zaditor eye drop     Josefina Lee MD    12/21/2021

## 2021-12-21 NOTE — PROGRESS NOTES
Hospitalist Progress Note    Subjective:   Daily Progress Note: 12/21/2021 4:21 PM    Hospital Course: The patient is a 51-year-old male with a PMH significant for intellectual disabilities, seizure disorder, quadriplegia. Lives in a group home. Caregivers state he had not been eating or drinking for 4 days or having bowel movements. Unsuccessful enema. Then he started vomiting several times. Brought to the emergency room he was hypertensive, tachycardic and altered from baseline. Chest x-ray consistent with aspiration pneumonia. He presents tachycardic and febrile with hypertension. Admitted for further management of sepsis, aspiration pneumonia, respiratory failure with hypoxia, breakthrough seizure activity. Blood pressure decreasing required ICU admission and septic shock and started on Levophed. Started on vancomycin and Zosyn for aspiration pneumonia. Required nasal cannula oxygen for acute hypoxic respiratory failure. Weaned off pressor therapy and transferred to medical floor for further management. Left leg Doppler study positive for left external iliac vein thrombosis, left common femoral vein thrombosis and left proximal femoral vein thrombosis. He was started on Eliquis. Decreased antibiotic to monotherapy IV Zosyn. Started on Ketotifen-eyedrop for left sclera edema. Subjective:  Examined patient at the bedside. He has good cough reflex. Unable to answer questions due to chronic baseline intellectual deficits. Mother at the bedside. Discuss goals of care.     Current Facility-Administered Medications   Medication Dose Route Frequency    apixaban (ELIQUIS) tablet 5 mg  5 mg Oral BID    0.9% sodium chloride infusion  75 mL/hr IntraVENous CONTINUOUS    levETIRAcetam in saline (iso-os) (KEPPRA) infusion 1,000 mg  1,000 mg IntraVENous BID    acetaminophen (TYLENOL) suppository 650 mg  650 mg Rectal Q4H PRN    ondansetron (ZOFRAN) injection 4 mg  4 mg IntraVENous Q6H PRN    NOREPINephrine (LEVOPHED) 8 mg in 5% dextrose 250mL (32 mcg/mL) infusion  0.5-16 mcg/min IntraVENous TITRATE    phenytoin (DILANTIN) 100 mg/4 mL oral suspension 100 mg  100 mg Oral DAILY    phenytoin (DILANTIN) 100 mg/4 mL oral suspension 130 mg  130 mg Oral QHS    0.9% sodium chloride infusion 1,000 mL  1,000 mL IntraVENous CONTINUOUS    0.9% sodium chloride infusion 1,000 mL  1,000 mL IntraVENous CONTINUOUS    cetirizine (ZYRTEC) tablet 10 mg  10 mg Oral DAILY    folic acid (FOLVITE) tablet 1 mg  1 mg Oral DAILY    ketotifen (ZADITOR) 0.025 % (0.035 %) ophthalmic solution 1 Drop  1 Drop Both Eyes BID    lactulose (CHRONULAC) 10 gram/15 mL solution 15 mL  10 g Oral DAILY    lamoTRIgine (LaMICtal) tablet 300 mg  300 mg Oral BID    psyllium husk-aspartame (METAMUCIL FIBER) packet 1 Packet  1 Packet Oral DAILY    sodium chloride (NS) flush 5-40 mL  5-40 mL IntraVENous Q8H    sodium chloride (NS) flush 5-40 mL  5-40 mL IntraVENous PRN    acetaminophen (TYLENOL) tablet 650 mg  650 mg Oral Q4H PRN    albuterol (PROVENTIL HFA, VENTOLIN HFA, PROAIR HFA) inhaler 2 Puff  2 Puff Inhalation Q4H PRN    piperacillin-tazobactam (ZOSYN) 3.375 g in 0.9% sodium chloride (MBP/ADV) 100 mL MBP  3.375 g IntraVENous Q8H        REVIEW OF SYSTEMS    Review of Systems   Unable to perform ROS: Mental acuity        Objective:     Visit Vitals  /73   Pulse 90   Temp 98.6 °F (37 °C)   Resp 19   Ht 5' (1.524 m)   Wt 59 kg (130 lb)   SpO2 93%   BMI 25.39 kg/m²    O2 Flow Rate (L/min): 0 l/min O2 Device: None (Room air)    Temp (24hrs), Av.5 °F (36.9 °C), Min:98.3 °F (36.8 °C), Max:98.8 °F (37.1 °C)      No intake/output data recorded.  1901 -  0700  In: -   Out: 3701 [Urine:3700]    PHYSICAL EXAM:    Physical Exam  Constitutional:       Appearance: He is ill-appearing. HENT:      Nose: Congestion present. Cardiovascular:      Rate and Rhythm: Regular rhythm. Tachycardia present.    Pulmonary:      Effort: No respiratory distress. Breath sounds: Rhonchi present. Abdominal:      General: Bowel sounds are normal.   Musculoskeletal:         General: Deformity present. Comments: Contracted, quadriplegic, bedbound   Skin:     Coloration: Skin is pale. Neurological:      Mental Status: Mental status is at baseline. Comments: Aphasic with intellectual disabilities          Data Review    Recent Results (from the past 24 hour(s))   GLUCOSE, POC    Collection Time: 12/20/21  7:37 PM   Result Value Ref Range    Glucose (POC) 112 65 - 117 mg/dL    Performed by Marlene Daniels        CTA CHEST W OR W WO CONT   Final Result   1. Extensive bilateral reticulonodular infiltrates throughout both lungs similar   to the previous study although both lungs were not included in their entirety on   the previous study. Findings may represent infectious, inflammatory or   neoplastic process. Recommend follow-up to. No evidence of pulmonary embolus,   aortic aneurysm or aortic dissection. 3. Borderline enlarged gastrohepatic lymph node. 4. Thickened esophagus, recommend clinical evaluation. DUPLEX LOWER EXT VENOUS BILAT   Final Result      XR CHEST PORT   Final Result   No significant interval change. CT ABD PELV W CONT   Final Result      1. Small focus of intraluminal gas within the urinary bladder. Please correlate   for recent instrumentation or other causes of intraluminal gas, including an   infectious process. 2. Somewhat reticular nodular opacities in the lung bases, suboptimally   evaluated due to respiratory motion. Some of these have what appears to be a   tree and bud morphology, suggesting either chronic aspiration or an infectious   or inflammatory small airways disease. Otherwise a fibrotic process should be   considered. This would be better characterized with dedicated chest CT as   clinically warranted. 3. Underdistention versus circumferential wall thickening of the distal   esophagus.    4. Probable left trochanteric bursitis with sterility of the fluid   indeterminate. 5. Probable myositis ossificans surrounding the left hip. Please correlate for   history of prior trauma. 6. Enlarged lymph nodes in the upper abdomen, nonspecific. Comparison with   outside imaging would be helpful, if available, to determine longer term   stability. Otherwise short interval follow-up in 3 months is recommended. XR CHEST SNGL V   Final Result   Patchy opacities within the lungs, nonspecific, but leading differential   considerations include infection or pulmonary edema. Superimposed acute disease   on an underlying chronic interstitial process is also possible. Radiographic   follow-up is recommended to exclude other causes of opacification. XR ABD (KUB)   Final Result   Mildly prominent gas-filled loops of bowel within the central abdomen with   overall nonobstructive bowel gas pattern.           Active Problems:    Sepsis ([address] (12/17/2021)      Aspiration pneumonia ([address] (12/17/2021)      Respiratory failure with hypoxia ([address] (12/21/2021)        Assessment/Plan:       Respiratory failure with hypoxia  Aspiration pneumonia  Continue oxygen as needed to maintain saturations greater than 92%: Currently on 3 L  Continues on IV Zosyn  ID following  Speech evaluation      Septic shockresolved  Off pressors    Seizure disorder  Continue home medication at same dose most likely subtherapeutic levels caused from med med interaction  Continue Keppra infusion 1000 mg twice daily  Dilantin 100 mg daily, 130 mg at bedtime  Neurology following    Intellectual disabilities  Quadriplegia  Plan return to group facility when stable    DVTs  Doppler study positive for left external iliac vein, left common femoral vein and left proximal femoral vein thrombosis  Started on Eliquis    DVT Prophylaxis: Eliquis  Code Status: Full Code  POA/NOK:    Disposition and discharge barriers:   · Continue treatment for aspiration pneumonia including IV antibiotics and oxygen  · Plan return to group home when stable  Care Plan discussed with: Mother, RN and IDR team  _____________________________________________________________________________  Time spent in direct care including coordination of service, review of data and examination: > 35 minutes    ______________________________________________________________________________    Patricia George NP    This is dictation was done by dragon, computer voice recognition software. Quite often unanticipated grammatical, syntax, homophones and other interpretive errors or inadvertently transcribed by the computer software. Please excuse errors that have escaped final proofreading. Thank you.

## 2021-12-22 LAB
ANION GAP SERPL CALC-SCNC: 9 MMOL/L (ref 5–15)
BASOPHILS # BLD: 0.1 K/UL (ref 0–0.1)
BASOPHILS NFR BLD: 1 % (ref 0–1)
BUN SERPL-MCNC: 5 MG/DL (ref 6–20)
BUN/CREAT SERPL: 8 (ref 12–20)
CA-I BLD-MCNC: 8.6 MG/DL (ref 8.5–10.1)
CHLORIDE SERPL-SCNC: 107 MMOL/L (ref 97–108)
CO2 SERPL-SCNC: 25 MMOL/L (ref 21–32)
CREAT SERPL-MCNC: 0.62 MG/DL (ref 0.7–1.3)
DIFFERENTIAL METHOD BLD: ABNORMAL
EOSINOPHIL # BLD: 0.3 K/UL (ref 0–0.4)
EOSINOPHIL NFR BLD: 4 % (ref 0–7)
ERYTHROCYTE [DISTWIDTH] IN BLOOD BY AUTOMATED COUNT: 13.1 % (ref 11.5–14.5)
GLUCOSE SERPL-MCNC: 81 MG/DL (ref 65–100)
HCT VFR BLD AUTO: 35.7 % (ref 36.6–50.3)
HGB BLD-MCNC: 11.2 G/DL (ref 12.1–17)
IMM GRANULOCYTES # BLD AUTO: 0 K/UL (ref 0–0.04)
IMM GRANULOCYTES NFR BLD AUTO: 0 % (ref 0–0.5)
LYMPHOCYTES # BLD: 1.5 K/UL (ref 0.8–3.5)
LYMPHOCYTES NFR BLD: 21 % (ref 12–49)
MCH RBC QN AUTO: 30.4 PG (ref 26–34)
MCHC RBC AUTO-ENTMCNC: 31.4 G/DL (ref 30–36.5)
MCV RBC AUTO: 96.7 FL (ref 80–99)
MONOCYTES # BLD: 0.6 K/UL (ref 0–1)
MONOCYTES NFR BLD: 9 % (ref 5–13)
NEUTS SEG # BLD: 4.4 K/UL (ref 1.8–8)
NEUTS SEG NFR BLD: 65 % (ref 32–75)
NRBC # BLD: 0 K/UL (ref 0–0.01)
NRBC BLD-RTO: 0 PER 100 WBC
PLATELET # BLD AUTO: 312 K/UL (ref 150–400)
PMV BLD AUTO: 9.3 FL (ref 8.9–12.9)
POTASSIUM SERPL-SCNC: 3.2 MMOL/L (ref 3.5–5.1)
RBC # BLD AUTO: 3.69 M/UL (ref 4.1–5.7)
SODIUM SERPL-SCNC: 141 MMOL/L (ref 136–145)
WBC # BLD AUTO: 6.8 K/UL (ref 4.1–11.1)

## 2021-12-22 PROCEDURE — 74011250637 HC RX REV CODE- 250/637: Performed by: HOSPITALIST

## 2021-12-22 PROCEDURE — 74011000258 HC RX REV CODE- 258: Performed by: HOSPITALIST

## 2021-12-22 PROCEDURE — 74011250636 HC RX REV CODE- 250/636: Performed by: NURSE PRACTITIONER

## 2021-12-22 PROCEDURE — 85025 COMPLETE CBC W/AUTO DIFF WBC: CPT

## 2021-12-22 PROCEDURE — 80048 BASIC METABOLIC PNL TOTAL CA: CPT

## 2021-12-22 PROCEDURE — 36415 COLL VENOUS BLD VENIPUNCTURE: CPT

## 2021-12-22 PROCEDURE — 65270000029 HC RM PRIVATE

## 2021-12-22 PROCEDURE — 74011250636 HC RX REV CODE- 250/636: Performed by: HOSPITALIST

## 2021-12-22 PROCEDURE — 99232 SBSQ HOSP IP/OBS MODERATE 35: CPT | Performed by: INTERNAL MEDICINE

## 2021-12-22 RX ORDER — POTASSIUM CHLORIDE 7.45 MG/ML
10 INJECTION INTRAVENOUS
Status: COMPLETED | OUTPATIENT
Start: 2021-12-22 | End: 2021-12-22

## 2021-12-22 RX ORDER — MIDODRINE HYDROCHLORIDE 5 MG/1
5 TABLET ORAL 2 TIMES DAILY WITH MEALS
Status: DISCONTINUED | OUTPATIENT
Start: 2021-12-22 | End: 2021-12-24

## 2021-12-22 RX ADMIN — Medication 10 ML: at 05:28

## 2021-12-22 RX ADMIN — PIPERACILLIN AND TAZOBACTAM 3.38 G: 3; .375 INJECTION, POWDER, LYOPHILIZED, FOR SOLUTION INTRAVENOUS at 14:28

## 2021-12-22 RX ADMIN — KETOTIFEN FUMARATE 1 DROP: 0.35 SOLUTION/ DROPS OPHTHALMIC at 22:39

## 2021-12-22 RX ADMIN — SODIUM CHLORIDE 500 ML: 9 INJECTION, SOLUTION INTRAVENOUS at 09:45

## 2021-12-22 RX ADMIN — POTASSIUM CHLORIDE 10 MEQ: 7.46 INJECTION, SOLUTION INTRAVENOUS at 13:01

## 2021-12-22 RX ADMIN — LEVETIRACETAM 1000 MG: 10 INJECTION, SOLUTION INTRAVENOUS at 22:29

## 2021-12-22 RX ADMIN — PIPERACILLIN AND TAZOBACTAM 3.38 G: 3; .375 INJECTION, POWDER, LYOPHILIZED, FOR SOLUTION INTRAVENOUS at 06:10

## 2021-12-22 RX ADMIN — POTASSIUM CHLORIDE 10 MEQ: 7.46 INJECTION, SOLUTION INTRAVENOUS at 12:00

## 2021-12-22 RX ADMIN — LAMOTRIGINE 300 MG: 100 TABLET ORAL at 22:27

## 2021-12-22 RX ADMIN — Medication 10 ML: at 14:28

## 2021-12-22 RX ADMIN — PIPERACILLIN AND TAZOBACTAM 3.38 G: 3; .375 INJECTION, POWDER, LYOPHILIZED, FOR SOLUTION INTRAVENOUS at 22:26

## 2021-12-22 RX ADMIN — Medication 10 ML: at 05:29

## 2021-12-22 RX ADMIN — POTASSIUM CHLORIDE 10 MEQ: 7.46 INJECTION, SOLUTION INTRAVENOUS at 14:28

## 2021-12-22 RX ADMIN — LEVETIRACETAM 1000 MG: 10 INJECTION, SOLUTION INTRAVENOUS at 09:47

## 2021-12-22 RX ADMIN — APIXABAN 5 MG: 5 TABLET, FILM COATED ORAL at 22:27

## 2021-12-22 RX ADMIN — Medication 10 ML: at 22:30

## 2021-12-22 RX ADMIN — PHENYTOIN 130 MG: 125 SUSPENSION ORAL at 22:27

## 2021-12-22 NOTE — CONSULTS
Gastroenterology Consult     Referring Physician: Unknown, Provider, MD     Consult Date: 12/22/2021     Subjective:     Chief Complaint: Lethargy    History of Present Illness: Jerry Hawk is a 55 y.o. male who is seen in consultation for peg tube placement. Mr. Tyler Ayala has a history of intellectual disability and medical history obtained from patient's mother Haydee Perkins and medical records. He does reside in a [address] [place] [address] and has been in the current home for less than a week per patient's mother. He is bedridden requiring total care. He has been on a soft diet prior to admission. He was sent to the ED with complaints of vomiting and lethargy. He has had poor oral intake and reports of no bowel movements even after having an enema. The staff report he did vomit multiple times. His baseline function is a few word and able to follow simple commands. He does have bilateral contracted hands. While in the ED he was found to have increased temperature, hypertension, and tachycardia meeting sepsis criteria. His chest x-ray shows suspected aspiration pneumonia. He is on IV zosyn and followed by infectious Disease. CT of the abdomen and pelvis shows nonspecific abnormalities but no significant intra-abdominal pathology. He has a past medical history significant for Intellectual disability, seizure disorder, DVT in the past and was on xarelto 10 mg preventative. He was diagnosed on admission with acute thrombus present in the left external iliac vein, left common femoral vein, and left proximal femoral vein. CTA of chest shows extensive bilateral reticulonodular infiltrated throughout both lungs. No evidence of Pulmonary Embolus. Spoke at length with patient's mother Haydee Perkins and discussed peg tube placement. All questions answered at this time. Patient's mother is in agreement with the peg tube.  Discussed patient is currently on the Eliquis for a new diagnosis of DVT in the left leg will need to consult hematology for possible heparin drip prior to peg tube placement. Plan for possible placement on Monday based on hematology's recommendations. CTA chest 12/21/2021: IMPRESSION  1. Extensive bilateral reticulonodular infiltrates throughout both lungs similar  to the previous study although both lungs were not included in their entirety on the previous study. Findings may represent infectious, inflammatory or  neoplastic process. Recommend follow-up to. No evidence of pulmonary embolus, aortic aneurysm or aortic dissection. 3. Borderline enlarged gastrohepatic lymph node. 4. Thickened esophagus, recommend clinical evaluation      Duplex lower extremities 12/20/2021:   · No evidence of acute deep vein thrombosis in the right common femoral, femoral, popliteal, posterior tibial, and peroneal veins. The veins were imaged in the transverse and longitudinal planes. The vessels showed normal color filling and compressibility. Doppler interrogation showed phasic and spontaneous flow. · Acute thrombus present in the left external iliac vein. · Acute thrombus present in the left common femoral vein. · Acute thrombus present in the left proximal femoral vein. CT abdomen/pelvis 12/17/2021:   IMPRESSION     1. Small focus of intraluminal gas within the urinary bladder. Please correlate for recent instrumentation or other causes of intraluminal gas, including an infectious process. 2. Somewhat reticular nodular opacities in the lung bases, suboptimally  evaluated due to respiratory motion. Some of these have what appears to be a tree and bud morphology, suggesting either chronic aspiration or an infectious or inflammatory small airways disease. Otherwise a fibrotic process should be considered. This would be better characterized with dedicated chest CT as clinically warranted. 3. Underdistention versus circumferential wall thickening of the distal  Esophagus.   4. Probable left trochanteric bursitis with sterility of the fluid  Indeterminate. 5. Probable myositis ossificans surrounding the left hip. Please correlate for history of prior trauma. 6. Enlarged lymph nodes in the upper abdomen, nonspecific. Comparison withoutside imaging would be helpful, if available, to determine longer term stability. Otherwise short interval follow-up in 3 months is recommended. Marion Robles (mother) [phone]  Ariel Hernandez (father) [phone]    Past Medical History:   Diagnosis Date    Intellectual disability     Quadriplegia, unspecified ([facility] [address]     Flexion contractures upper and lower extremities    Seizure disorder [facility])      History reviewed. No pertinent surgical history.    Family History   Family history unknown: Yes     Social History     Tobacco Use    Smoking status: Never Smoker    Smokeless tobacco: Never Used   Substance Use Topics    Alcohol use: Never      No Known Allergies  Current Facility-Administered Medications   Medication Dose Route Frequency    midodrine (PROAMATINE) tablet 5 mg  5 mg Oral BID WITH MEALS    apixaban (ELIQUIS) tablet 5 mg  5 mg Oral BID    0.9% sodium chloride infusion  75 mL/hr IntraVENous CONTINUOUS    levETIRAcetam in saline (iso-os) (KEPPRA) infusion 1,000 mg  1,000 mg IntraVENous BID    acetaminophen (TYLENOL) suppository 650 mg  650 mg Rectal Q4H PRN    ondansetron (ZOFRAN) injection 4 mg  4 mg IntraVENous Q6H PRN    phenytoin (DILANTIN) 100 mg/4 mL oral suspension 100 mg  100 mg Oral DAILY    phenytoin (DILANTIN) 100 mg/4 mL oral suspension 130 mg  130 mg Oral QHS    0.9% sodium chloride infusion 1,000 mL  1,000 mL IntraVENous CONTINUOUS    0.9% sodium chloride infusion 1,000 mL  1,000 mL IntraVENous CONTINUOUS    cetirizine (ZYRTEC) tablet 10 mg  10 mg Oral DAILY    folic acid (FOLVITE) tablet 1 mg  1 mg Oral DAILY    ketotifen (ZADITOR) 0.025 % (0.035 %) ophthalmic solution 1 Drop  1 Drop Both Eyes BID    lactulose (CHRONULAC) 10 gram/15 mL solution 15 mL  10 g Oral DAILY    lamoTRIgine (LaMICtal) tablet 300 mg  300 mg Oral BID    psyllium husk-aspartame (METAMUCIL FIBER) packet 1 Packet  1 Packet Oral DAILY    sodium chloride (NS) flush 5-40 mL  5-40 mL IntraVENous Q8H    sodium chloride (NS) flush 5-40 mL  5-40 mL IntraVENous PRN    acetaminophen (TYLENOL) tablet 650 mg  650 mg Oral Q4H PRN    albuterol (PROVENTIL HFA, VENTOLIN HFA, PROAIR HFA) inhaler 2 Puff  2 Puff Inhalation Q4H PRN    piperacillin-tazobactam (ZOSYN) 3.375 g in 0.9% sodium chloride (MBP/ADV) 100 mL MBP  3.375 g IntraVENous Q8H        Review of Systems:  A detailed 10 organ review of systems is obtained with pertinent positives as listed in the History of Present Illness and Past Medical History. All others are negative. Objective:     Physical Exam:  Visit Vitals  BP 97/65   Pulse 96   Temp 98.5 °F (36.9 °C)   Resp 20   Ht 5' (1.524 m)   Wt 59 kg (130 lb)   SpO2 91%   BMI 25.39 kg/m²        Skin:  Extremities and face reveal no rashes. No mark erythema. No telangiectasias on the chest wall. HEENT: Sclerae anicteric. Extra-occular muscles are intact. No abnormal pigmentation of the lips. The neck is supple. Cardiovascular: Regular rate and rhythm. Respiratory:  Comfortable breathing with no accessory muscle use. GI:  Abdomen nondistended, soft, and nontender. Normal active bowel sounds. Rectal:  Deferred  Musculoskeletal: week, Bilateral pedal edema  Neurological:  Intellectual disability  Psychiatric:  Alert, non verbal  Lymphatic:  No cervical or supraclavicular adenopathy.     Lab/Data Review:  BMP:   Lab Results   Component Value Date/Time     12/22/2021 09:08 AM    K 3.2 (L) 12/22/2021 09:08 AM     12/22/2021 09:08 AM    CO2 25 12/22/2021 09:08 AM    AGAP 9 12/22/2021 09:08 AM    GLU 81 12/22/2021 09:08 AM    BUN 5 (L) 12/22/2021 09:08 AM    CREA 0.62 (L) 12/22/2021 09:08 AM    GFRAA >60 12/22/2021 09:08 AM    GFRNA >60 12/22/2021 09:08 AM     CMP:   Lab Results Component Value Date/Time     12/22/2021 09:08 AM    K 3.2 (L) 12/22/2021 09:08 AM     12/22/2021 09:08 AM    CO2 25 12/22/2021 09:08 AM    AGAP 9 12/22/2021 09:08 AM    GLU 81 12/22/2021 09:08 AM    BUN 5 (L) 12/22/2021 09:08 AM    CREA 0.62 (L) 12/22/2021 09:08 AM    GFRAA >60 12/22/2021 09:08 AM    GFRNA >60 12/22/2021 09:08 AM    CA 8.6 12/22/2021 09:08 AM     CBC:   Lab Results   Component Value Date/Time    WBC 6.8 12/22/2021 09:08 AM    HGB 11.2 (L) 12/22/2021 09:08 AM    HCT 35.7 (L) 12/22/2021 09:08 AM     12/22/2021 09:08 AM     COAGS: No results found for: APTT, PTP, INR, INREXT, INREXT      Assessment/Plan:     1. Dysphagia     Peg tube placement pending Hematology recommendation  Concerning stopping Eliquis/ possible heparin drip prior to procedure      Mother in agreement for peg tube placement      Poor oral intake  2. Sepsis 2/2 aspiration pneumonia      Infectious Disease input appreciated      Continue IV zosyn      Currently afebrile        Currently on room air  3. Seizure disorder      Neurology input appreciated      Continue home medications  4. Acute Left leg DVT     Currently on Eliquis 5 mg BID          Thank you for allowing me to participate in this patients care. Plan discussed with Dr. Carol Maguire and he approves.

## 2021-12-22 NOTE — PROGRESS NOTES
Hospitalist Progress Note    Subjective:   Daily Progress Note: 12/22/2021 4:21 PM    Hospital Course: The patient is a 59-year-old male with a PMH significant for intellectual disabilities, seizure disorder, quadriplegia. Lives in a group home. Caregivers state he had not been eating or drinking for 4 days or having bowel movements. Unsuccessful enema. Then he started vomiting several times. Brought to the emergency room he was hypertensive, tachycardic and altered from baseline. Chest x-ray consistent with aspiration pneumonia. He presents tachycardic and febrile with hypertension. Admitted for further management of sepsis, aspiration pneumonia, respiratory failure with hypoxia, breakthrough seizure activity. Blood pressure decreasing required ICU admission and septic shock and started on Levophed. Started on vancomycin and Zosyn for aspiration pneumonia. Required nasal cannula oxygen for acute hypoxic respiratory failure. Weaned off pressor therapy and transferred to medical floor for further management. Left leg Doppler study positive for left external iliac vein thrombosis, left common femoral vein thrombosis and left proximal femoral vein thrombosis. He was started on Eliquis. Decreased antibiotic to monotherapy IV Zosyn. Started on Ketotifen-eyedrop for left sclera edema. Due to high risk of aspiration secondary to chronic disabilities and anatomical swallowing weakness, inability to maintain hydration and nutrition status he would benefit from feeding tube placement. GI has been consulted. Subjective:  Examined patient at the bedside. Unable to answer questions due to chronic baseline intellectual deficits.       Current Facility-Administered Medications   Medication Dose Route Frequency    potassium chloride 10 mEq in 100 ml IVPB  10 mEq IntraVENous Q1H    midodrine (PROAMATINE) tablet 5 mg  5 mg Oral BID WITH MEALS    apixaban (ELIQUIS) tablet 5 mg  5 mg Oral BID    0.9% sodium chloride infusion  75 mL/hr IntraVENous CONTINUOUS    levETIRAcetam in saline (iso-os) (KEPPRA) infusion 1,000 mg  1,000 mg IntraVENous BID    acetaminophen (TYLENOL) suppository 650 mg  650 mg Rectal Q4H PRN    ondansetron (ZOFRAN) injection 4 mg  4 mg IntraVENous Q6H PRN    phenytoin (DILANTIN) 100 mg/4 mL oral suspension 100 mg  100 mg Oral DAILY    phenytoin (DILANTIN) 100 mg/4 mL oral suspension 130 mg  130 mg Oral QHS    0.9% sodium chloride infusion 1,000 mL  1,000 mL IntraVENous CONTINUOUS    0.9% sodium chloride infusion 1,000 mL  1,000 mL IntraVENous CONTINUOUS    cetirizine (ZYRTEC) tablet 10 mg  10 mg Oral DAILY    folic acid (FOLVITE) tablet 1 mg  1 mg Oral DAILY    ketotifen (ZADITOR) 0.025 % (0.035 %) ophthalmic solution 1 Drop  1 Drop Both Eyes BID    lactulose (CHRONULAC) 10 gram/15 mL solution 15 mL  10 g Oral DAILY    lamoTRIgine (LaMICtal) tablet 300 mg  300 mg Oral BID    psyllium husk-aspartame (METAMUCIL FIBER) packet 1 Packet  1 Packet Oral DAILY    sodium chloride (NS) flush 5-40 mL  5-40 mL IntraVENous Q8H    sodium chloride (NS) flush 5-40 mL  5-40 mL IntraVENous PRN    acetaminophen (TYLENOL) tablet 650 mg  650 mg Oral Q4H PRN    albuterol (PROVENTIL HFA, VENTOLIN HFA, PROAIR HFA) inhaler 2 Puff  2 Puff Inhalation Q4H PRN    piperacillin-tazobactam (ZOSYN) 3.375 g in 0.9% sodium chloride (MBP/ADV) 100 mL MBP  3.375 g IntraVENous Q8H        REVIEW OF SYSTEMS    Review of Systems   Unable to perform ROS: Mental acuity        Objective:     Visit Vitals  BP 95/60   Pulse 74   Temp 98.2 °F (36.8 °C)   Resp 20   Ht 5' (1.524 m)   Wt 59 kg (130 lb)   SpO2 92%   BMI 25.39 kg/m²    O2 Flow Rate (L/min): 0 l/min O2 Device: None (Room air)    Temp (24hrs), Av.6 °F (37 °C), Min:98.2 °F (36.8 °C), Max:98.9 °F (37.2 °C)      No intake/output data recorded.    1901 -  0700  In: -   Out: 1     PHYSICAL EXAM:    Physical Exam  Constitutional:       Appearance: He is ill-appearing. HENT:      Nose: Congestion present. Cardiovascular:      Rate and Rhythm: Regular rhythm. Tachycardia present. Pulmonary:      Effort: No respiratory distress. Breath sounds: Rhonchi present. Abdominal:      General: Bowel sounds are normal.   Musculoskeletal:         General: Deformity present. Comments: Contracted, quadriplegic, bedbound   Skin:     Coloration: Skin is pale. Neurological:      Mental Status: Mental status is at baseline. Comments: Aphasic with intellectual disabilities          Data Review    Recent Results (from the past 24 hour(s))   CBC WITH AUTOMATED DIFF    Collection Time: 12/22/21  9:08 AM   Result Value Ref Range    WBC 6.8 4.1 - 11.1 K/uL    RBC 3.69 (L) 4.10 - 5.70 M/uL    HGB 11.2 (L) 12.1 - 17.0 g/dL    HCT 35.7 (L) 36.6 - 50.3 %    MCV 96.7 80.0 - 99.0 FL    MCH 30.4 26.0 - 34.0 PG    MCHC 31.4 30.0 - 36.5 g/dL    RDW 13.1 11.5 - 14.5 %    PLATELET 380 574 - 641 K/uL    MPV 9.3 8.9 - 12.9 FL    NRBC 0.0 0.0  WBC    ABSOLUTE NRBC 0.00 0.00 - 0.01 K/uL    NEUTROPHILS 65 32 - 75 %    LYMPHOCYTES 21 12 - 49 %    MONOCYTES 9 5 - 13 %    EOSINOPHILS 4 0 - 7 %    BASOPHILS 1 0 - 1 %    IMMATURE GRANULOCYTES 0 0 - 0.5 %    ABS. NEUTROPHILS 4.4 1.8 - 8.0 K/UL    ABS. LYMPHOCYTES 1.5 0.8 - 3.5 K/UL    ABS. MONOCYTES 0.6 0.0 - 1.0 K/UL    ABS. EOSINOPHILS 0.3 0.0 - 0.4 K/UL    ABS. BASOPHILS 0.1 0.0 - 0.1 K/UL    ABS. IMM.  GRANS. 0.0 0.00 - 0.04 K/UL    DF AUTOMATED     METABOLIC PANEL, BASIC    Collection Time: 12/22/21  9:08 AM   Result Value Ref Range    Sodium 141 136 - 145 mmol/L    Potassium 3.2 (L) 3.5 - 5.1 mmol/L    Chloride 107 97 - 108 mmol/L    CO2 25 21 - 32 mmol/L    Anion gap 9 5 - 15 mmol/L    Glucose 81 65 - 100 mg/dL    BUN 5 (L) 6 - 20 mg/dL    Creatinine 0.62 (L) 0.70 - 1.30 mg/dL    BUN/Creatinine ratio 8 (L) 12 - 20      GFR est AA >60 >60 ml/min/1.73m2    GFR est non-AA >60 >60 ml/min/1.73m2    Calcium 8.6 8.5 - 10.1 mg/dL CTA CHEST W OR W WO CONT   Final Result   1. Extensive bilateral reticulonodular infiltrates throughout both lungs similar   to the previous study although both lungs were not included in their entirety on   the previous study. Findings may represent infectious, inflammatory or   neoplastic process. Recommend follow-up to. No evidence of pulmonary embolus,   aortic aneurysm or aortic dissection. 3. Borderline enlarged gastrohepatic lymph node. 4. Thickened esophagus, recommend clinical evaluation. DUPLEX LOWER EXT VENOUS BILAT   Final Result      XR CHEST PORT   Final Result   No significant interval change. CT ABD PELV W CONT   Final Result      1. Small focus of intraluminal gas within the urinary bladder. Please correlate   for recent instrumentation or other causes of intraluminal gas, including an   infectious process. 2. Somewhat reticular nodular opacities in the lung bases, suboptimally   evaluated due to respiratory motion. Some of these have what appears to be a   tree and bud morphology, suggesting either chronic aspiration or an infectious   or inflammatory small airways disease. Otherwise a fibrotic process should be   considered. This would be better characterized with dedicated chest CT as   clinically warranted. 3. Underdistention versus circumferential wall thickening of the distal   esophagus. 4. Probable left trochanteric bursitis with sterility of the fluid   indeterminate. 5. Probable myositis ossificans surrounding the left hip. Please correlate for   history of prior trauma. 6. Enlarged lymph nodes in the upper abdomen, nonspecific. Comparison with   outside imaging would be helpful, if available, to determine longer term   stability. Otherwise short interval follow-up in 3 months is recommended. XR CHEST SNGL V   Final Result   Patchy opacities within the lungs, nonspecific, but leading differential   considerations include infection or pulmonary edema. Superimposed acute disease   on an underlying chronic interstitial process is also possible. Radiographic   follow-up is recommended to exclude other causes of opacification. XR ABD (KUB)   Final Result   Mildly prominent gas-filled loops of bowel within the central abdomen with   overall nonobstructive bowel gas pattern. Active Problems:    Sepsis ([address] (12/17/2021)      Aspiration pneumonia ([address] (12/17/2021)      Respiratory failure with hypoxia ([address] (12/21/2021)        Assessment/Plan:       Respiratory failure with hypoxia  Aspiration pneumonia  Continue oxygen as needed to maintain saturations greater than 92%: Currently on 3 L  Continues on IV Zosyn  ID following  Speech evaluation pending  GI consulted for feeding tube placement due to high risk aspiration and inability to meet nutrition and hydration needs      Septic shockresolved  Off pressors    Seizure disorder  Continue home medication at same dose most likely subtherapeutic levels caused from med med interaction  Continue Keppra infusion 1000 mg twice daily  Dilantin 100 mg daily, 130 mg at bedtime  Neurology following    Intellectual disabilities  Quadriplegia  Plan return to group facility when stable    DVTs  Doppler study positive for left external iliac vein, left common femoral vein and left proximal femoral vein thrombosis  Started on Eliquis    Hypertension  Started on midodrine twice daily 5 mg    Hypokalemia  Replete and monitor    DVT Prophylaxis: Eliquis  Code Status: Full Code  POA/NOK:    Disposition and discharge barriers:   · Continue treatment for aspiration pneumonia including IV antibiotics and oxygen  · GI and nutrition consulted for feeding tube placement  · Plan return to group home when stable  Care Plan discussed with:  Mother, RN and IDR team  _____________________________________________________________________________  Time spent in direct care including coordination of service, review of data and examination: > 35 minutes    ______________________________________________________________________________    Mannie Jane NP    This is dictation was done by [employer], computer voice recognition software. Quite often unanticipated grammatical, syntax, homophones and other interpretive errors or inadvertently transcribed by the computer software. Please excuse errors that have escaped final proofreading. Thank you.

## 2021-12-22 NOTE — PROGRESS NOTES
Infectious Disease Progress Note           Subjective:   Pt seen and examined at bedside. Stable, no change in clinical status since last seen   Objective:   Physical Exam:     Visit Vitals  BP 97/65   Pulse 96   Temp 98.5 °F (36.9 °C)   Resp 20   Ht 5' (1.524 m)   Wt 130 lb (59 kg)   SpO2 91%   BMI 25.39 kg/m²    O2 Flow Rate (L/min): 0 l/min O2 Device: None (Room air)    Temp (24hrs), Av.5 °F (36.9 °C), Min:98.2 °F (36.8 °C), Max:98.9 °F (37.2 °C)    No intake/output data recorded.     1901 -  0700  In: -   Out: 1     General: NAD, alert, non-verbal   HEENT: ALEJO, Moist mucosa, left sclera erythema   Lungs: CTA b/l, decreased at the bases, no wheeze/rhonchi   Heart: S1S2+, RRR, no murmur  Abdo: Soft, NT, ND, +BS   Exts: Contracted exts, some leg swelling   Skin: No wounds, No rashes or lesions    Data Review:       Recent Days:  Recent Labs     21  0908 21  0500   WBC 6.8 6.6   HGB 11.2* 10.0*   HCT 35.7* 31.1*    265     Recent Labs     21  0908   BUN 5*   CREA 0.62*       Lab Results   Component Value Date/Time    C-Reactive protein 6.44 (H) 2021 05:00 AM        Microbiology     Results     Procedure Component Value Units Date/Time    MRSA SCREEN - PCR (NASAL) [[id number]] Collected: 21 1100    Order Status: Canceled Specimen: Swab     CULTURE, BLOOD [[id number]] Collected: 21 1100    Order Status: Completed Specimen: Blood Updated: 21 1109     Special Requests: No Special Requests        Culture result: No growth 4 days       CULTURE, SPUTUM/BRONCH/OTH [[id number]] Collected: 21 2115    Order Status: Canceled Specimen: Sputum     COVID-19 RAPID TEST [[id number]] Collected: 21 1620    Order Status: Completed Specimen: Nasopharyngeal Updated: 21 1740     Specimen source Nasopharyngeal        COVID-19 rapid test Not Detected        Comment: Rapid Abbott ID Now   Rapid NAAT:  The specimen is NEGATIVE for SARS-CoV-2, the novel coronavirus associated with COVID-19. Negative results should be treated as presumptive and, if inconsistent with clinical signs and symptoms or necessary for patient management, should be tested with an alternative molecular assay. Negative results do not preclude SARS-CoV-2 infection and should not be used as the sole basis for patient management decisions. This test has been authorized by the FDA under   an Emergency Use Authorization (EUA) for use by authorized laboratories. Fact sheet for Healthcare Providers: [employer] Fact sheet for Patients: [employer]   Methodology: Isothermal Nucleic Acid Amplification             Diagnostics   CXR Results  (Last 48 hours)    None             Assessment/Plan     1. Sepsis on admission due to suspected  aspiration PNA, COVID Neg       Extensive b/l reticulonodular infiltrates throughout both lungs similar on CT chest       O2 sats in the low 90s on RA, no cough       On day # 5 of empiric Zosyn. Routine labs in the morning        Consider pulmonary eval for reticulonodular infiltrates on CT if worsening hypoxia      2. Poor oral intake for a few days PTP: Mother leaning toward peg tube placement       Seen by GI for peg tube placement, plan to be discussed w mother       3. H/o seizure d/o, Continue anti-epileptics, being followed by neurology     4. Intellectual disability, quadriplegic w contracted ext    5. Acute left leg DVT, anticoagulated on Eliquis     6.  Left sclera erythema: Still w sig erythema, reason unclear     Supriya Alcantara MD    12/22/2021

## 2021-12-22 NOTE — PROGRESS NOTES
Nutrition Note    RD consulted for TF; GI consult for PEG placement. Recommendations  Rec'd NG placement while awaiting GI assessment    Initiate TF of Jevity 1.5 at 30mL/hr     Increase after 6 hours to goal rate 45mL/hr, continuous  Flush with 120mL free water q4hr  Goal feeds provide 1620kcal, 70g protein, 1541mL fluid (>/= 100% est needs)    Estimated Daily Nutrient Needs:  Energy (kcal): 1500kcl (25kcal/kg); Weight Used for Energy Requirements: Current  Protein (g): 60g (1.0g/kg);  Weight Used for Protein Requirements: Current  Fluid (ml/day): 1500ml; Method Used for Fluid Requirements: 1 ml/kcal       Electronically signed by Velia Urias on 12/22/2021 at 11:17 AM    Contact:

## 2021-12-22 NOTE — PROGRESS NOTES
Attempted to see patient. Patient sleeping soundly and would not arouse to verbal and tactile cues. Nsg reports patient lethargic and minimal intake. Recommend to hold p.o. intake if patient is not alert and lethargy is increased due to aspiration risk. Patient continues to be an aspiration risk and nutritional risk. PEG placement has been recommended.

## 2021-12-22 NOTE — CONSULTS
Hematology/Oncology Consult    Patient: Idalmis Thakur MRN: [id number]     YOB: 1975  Age: 55 y.o. Sex: male      HPI      Idalmis Thakur is a 55 y.o. male who is being seen for DVT of the left lower extremity and recommendations for perioperative anticoagulation. Mr. Aston Lee is a 72-year-old male patient with history of intellectual disability and was bedridden requiring total care from a group home who was admitted with altered mental status and lethargy. He was diagnosed with aspiration pneumonia for which she is on antibiotics. Reportedly he has been on a maintenance dose Xarelto 10 mg daily for history of DVT. On admission here he was noted to have extensive left lower extremity DVT including the left external iliac, left common femoral vein as well as left proximal femoral vein. The patient has been started on Eliquis which she has been taking. Hematology was consulted by gastroenterology for recommendations for perioperative anticoagulation for upcoming PEG tube placement. The patient is nonverbal and is unable to provide any history. Past Medical History:   Diagnosis Date    Intellectual disability     Quadriplegia, unspecified (HCC)     Flexion contractures upper and lower extremities    Seizure disorder ([facility]      History reviewed. No pertinent surgical history.    Family History   Family history unknown: Yes     Social History     Tobacco Use    Smoking status: Never Smoker    Smokeless tobacco: Never Used   Substance Use Topics    Alcohol use: Never      Current Facility-Administered Medications   Medication Dose Route Frequency Provider Last Rate Last Admin    midodrine (PROAMATINE) tablet 5 mg  5 mg Oral BID WITH MEALS Teresa Ortiz NP        apixaban Chintan Grewal) tablet 5 mg  5 mg Oral BID Dia Huitron MD   5 mg at 12/21/21 2134    0.9% sodium chloride infusion  75 mL/hr IntraVENous CONTINUOUS Dia Huitron MD 75 mL/hr at 12/19/21 2110 75 mL/hr at 12/19/21 2110    levETIRAcetam in saline (iso-os) (KEPPRA) infusion 1,000 mg  1,000 mg IntraVENous BID Celia Devries  mL/hr at 12/18/21 2109 1,000 mg at 12/22/21 0947    acetaminophen (TYLENOL) suppository 650 mg  650 mg Rectal Q4H PRN Celia Devries MD   650 mg at 12/19/21 1602    ondansetron (ZOFRAN) injection 4 mg  4 mg IntraVENous Q6H PRN Celia Devries MD   4 mg at 12/19/21 2117    phenytoin (DILANTIN) 100 mg/4 mL oral suspension 100 mg  100 mg Oral DAILY Celia Devries MD   100 mg at 12/21/21 1034    phenytoin (DILANTIN) 100 mg/4 mL oral suspension 130 mg  130 mg Oral QHS Celia Devries MD   130 mg at 12/21/21 2135    0.9% sodium chloride infusion 1,000 mL  1,000 mL IntraVENous CONTINUOUS Bobbi Andrea MD   1,000 mL at 12/17/21 1952    0.9% sodium chloride infusion 1,000 mL  1,000 mL IntraVENous CONTINUOUS Bobbi Andrea MD   1,000 mL at 12/20/21 1652    cetirizine (ZYRTEC) tablet 10 mg  10 mg Oral DAILY Celia Devries MD   10 mg at 97/42/06 6633    folic acid (FOLVITE) tablet 1 mg  1 mg Oral DAILY Celia Devries MD   1 mg at 12/21/21 0919    ketotifen (ZADITOR) 0.025 % (0.035 %) ophthalmic solution 1 Drop  1 Drop Both Eyes BID Celia Devries MD   1 Drop at 12/21/21 2342    lactulose (CHRONULAC) 10 gram/15 mL solution 15 mL  10 g Oral DAILY Celia Devries MD   15 mL at 12/21/21 0919    lamoTRIgine (LaMICtal) tablet 300 mg  300 mg Oral BID Celia Devries MD   300 mg at 12/21/21 2135    psyllium husk-aspartame (METAMUCIL FIBER) packet 1 Packet  [address] [facility] Celia Devries MD   1 Packet at 12/21/21 0919    sodium chloride (NS) flush 5-40 mL  5-40 mL IntraVENous Q8H Celia Devries MD   10 mL at 12/22/21 1428    sodium chloride (NS) flush 5-40 mL  5-40 mL IntraVENous PRN Celia Devries MD        acetaminophen (TYLENOL) tablet 650 mg  650 mg Oral Q4H PRN Jose F Jean-Paul Lerner MD   650 mg at 12/19/21 2155    albuterol (PROVENTIL HFA, VENTOLIN HFA, PROAIR HFA) inhaler 2 Puff  2 Puff Inhalation Q4H PRN Cari Coyle MD        piperacillin-tazobactam (ZOSYN) 3.375 g in 0.9% sodium chloride (MBP/ADV) 100 mL MBP  3.375 g IntraVENous Q8H Cari Coyle MD 25 mL/hr at 12/22/21 1428 3.375 g at 12/22/21 1428        No Known Allergies    Review of Systems:  Could not be obtained due to mental status      Objective:     Vitals:    12/22/21 0327 12/22/21 0841 12/22/21 0852 12/22/21 1408   BP: 108/65 (!) 88/49 95/60 97/65   Pulse: 88 82 74 96   Resp: 21 20 20   Temp: 98.5 °F (36.9 °C) 98.2 °F (36.8 °C)  98.5 °F (36.9 °C)   SpO2: 95% 90% 92% 91%   Weight:       Height:            Physical Exam:  Constitutional  ill-appearing. Contracted upper extremities   Head Normocephalic; no scars   Eyes Conjunctivae and sclerae are clear and without icterus. Pupils are reactive and equal.   ENMT Sinuses are nontender. No oral exudates, ulcers, masses, thrush or mucositis. Oropharynx clear. Tongue normal.   Neck Supple without masses or thyromegaly. No jugular venous distension. Hematologic/Lymphatic No petechiae or purpura. No tender or palpable lymph nodes in the cervical, supraclavicular, axillary or inguinal area. Respiratory Lungs are clear to auscultation without rhonchi or wheezing. Cardiovascular Regular rate and rhythm of heart without murmurs, gallops or rubs. Chest / Line Site Chest is symmetric with no chest wall deformities. Abdomen Non-tender, non-distended, no masses, ascites or hepatosplenomegaly. Good bowel sounds. No guarding or rebound tenderness. No pulsatile masses. Skin No rashes, scars, or lesions suggestive of malignancy. No petechiae, purpura, or ecchymoses. No excoriations.     Neurologic  unable to follow commands     Lab/Data Review:  Recent Labs     12/22/21  0908 12/20/21  0500   WBC 6.8 6.6   HGB 11.2* 10.0*   HCT 35.7* 31.1*    265 Recent Labs     12/22/21  0908      K 3.2*      CO2 25   GLU 81   BUN 5*   CREA 0.62*   CA 8.6     No results for input(s): PH, PCO2, PO2, HCO3, FIO2 in the last 72 hours. Recent Results (from the past 24 hour(s))   CBC WITH AUTOMATED DIFF    Collection Time: 12/22/21  9:08 AM   Result Value Ref Range    WBC 6.8 4.1 - 11.1 K/uL    RBC 3.69 (L) 4.10 - 5.70 M/uL    HGB 11.2 (L) 12.1 - 17.0 g/dL    HCT 35.7 (L) 36.6 - 50.3 %    MCV 96.7 80.0 - 99.0 FL    MCH 30.4 26.0 - 34.0 PG    MCHC 31.4 30.0 - 36.5 g/dL    RDW 13.1 11.5 - 14.5 %    PLATELET 975 238 - 315 K/uL    MPV 9.3 8.9 - 12.9 FL    NRBC 0.0 0.0  WBC    ABSOLUTE NRBC 0.00 0.00 - 0.01 K/uL    NEUTROPHILS 65 32 - 75 %    LYMPHOCYTES 21 12 - 49 %    MONOCYTES 9 5 - 13 %    EOSINOPHILS 4 0 - 7 %    BASOPHILS 1 0 - 1 %    IMMATURE GRANULOCYTES 0 0 - 0.5 %    ABS. NEUTROPHILS 4.4 1.8 - 8.0 K/UL    ABS. LYMPHOCYTES 1.5 0.8 - 3.5 K/UL    ABS. MONOCYTES 0.6 0.0 - 1.0 K/UL    ABS. EOSINOPHILS 0.3 0.0 - 0.4 K/UL    ABS. BASOPHILS 0.1 0.0 - 0.1 K/UL    ABS. IMM. GRANS. 0.0 0.00 - 0.04 K/UL    DF AUTOMATED     METABOLIC PANEL, BASIC    Collection Time: 12/22/21  9:08 AM   Result Value Ref Range    Sodium 141 136 - 145 mmol/L    Potassium 3.2 (L) 3.5 - 5.1 mmol/L    Chloride 107 97 - 108 mmol/L    CO2 25 21 - 32 mmol/L    Anion gap 9 5 - 15 mmol/L    Glucose 81 65 - 100 mg/dL    BUN 5 (L) 6 - 20 mg/dL    Creatinine 0.62 (L) 0.70 - 1.30 mg/dL    BUN/Creatinine ratio 8 (L) 12 - 20      GFR est AA >60 >60 ml/min/1.73m2    GFR est non-AA >60 >60 ml/min/1.73m2    Calcium 8.6 8.5 - 10.1 mg/dL        XR CHEST SNGL V    Result Date: 12/17/2021  Patchy opacities within the lungs, nonspecific, but leading differential considerations include infection or pulmonary edema. Superimposed acute disease on an underlying chronic interstitial process is also possible. Radiographic follow-up is recommended to exclude other causes of opacification.     XR ABD (KUB)    Result Date: 12/17/2021  Mildly prominent gas-filled loops of bowel within the central abdomen with overall nonobstructive bowel gas pattern. CTA CHEST W OR W WO CONT    Result Date: 12/21/2021  1. Extensive bilateral reticulonodular infiltrates throughout both lungs similar to the previous study although both lungs were not included in their entirety on the previous study. Findings may represent infectious, inflammatory or neoplastic process. Recommend follow-up to. No evidence of pulmonary embolus, aortic aneurysm or aortic dissection. 3. Borderline enlarged gastrohepatic lymph node. 4. Thickened esophagus, recommend clinical evaluation. CT ABD PELV W CONT    Result Date: 12/17/2021  1. Small focus of intraluminal gas within the urinary bladder. Please correlate for recent instrumentation or other causes of intraluminal gas, including an infectious process. 2. Somewhat reticular nodular opacities in the lung bases, suboptimally evaluated due to respiratory motion. Some of these have what appears to be a tree and bud morphology, suggesting either chronic aspiration or an infectious or inflammatory small airways disease. Otherwise a fibrotic process should be considered. This would be better characterized with dedicated chest CT as clinically warranted. 3. Underdistention versus circumferential wall thickening of the distal esophagus. 4. Probable left trochanteric bursitis with sterility of the fluid indeterminate. 5. Probable myositis ossificans surrounding the left hip. Please correlate for history of prior trauma. 6. Enlarged lymph nodes in the upper abdomen, nonspecific. Comparison with outside imaging would be helpful, if available, to determine longer term stability. Otherwise short interval follow-up in 3 months is recommended. XR CHEST PORT    Result Date: 12/20/2021  No significant interval change.        Assessment and Plan:     Hospital Problems  Date Reviewed: 12/17/2021          Codes Class Noted POA    Respiratory failure with hypoxia [facility]) ICD-10-CM: J96.91  ICD-9-CM: 518.81  12/21/2021 Unknown        Sepsis ([facility] ICD-10-CM: A41.9  ICD-9-CM: 038.9, 995.91  12/17/2021 Yes        Aspiration pneumonia ([facility] ICD-10-CM: J69.0  ICD-9-CM: 507.0  12/17/2021 Unknown              Left lower extremity DVT:  -Reportedly with prior history of DVT and had been on Xarelto 10 mg daily as maintenance.  -Noted to have extensive proximal left lower extremity DVT and started on Eliquis.  -For upcoming PEG tube placement would recommend discontinuation of Eliquis at least 3 days before the procedure and start bridging heparin drip which can be held at the time of procedure.  -We will be able to restart Eliquis at least 12 hours after the procedure. Thank you for the consult.   Case was discussed with Sean Pisano NP    Signed By: Zenon Low MD     December 22, 2021

## 2021-12-22 NOTE — PROGRESS NOTES
CM has reviewed chart. Patient to be evaluated for PEG tube placement by GI. Once medically stable will be discharged back to group [place].     Discharge plan is to return back to [facility])

## 2021-12-22 NOTE — PROGRESS NOTES
Problem: Pressure Injury - Risk of  Goal: *Prevention of pressure injury  Description: Document Martin Scale and appropriate interventions in the flowsheet. Outcome: Progressing Towards Goal  Note: Pressure Injury Interventions:  Sensory Interventions: Assess changes in LOC    Moisture Interventions: Absorbent underpads    Activity Interventions: PT/OT evaluation    Mobility Interventions: HOB 30 degrees or less,PT/OT evaluation,Turn and reposition approx. every two hours(pillow and wedges)    Nutrition Interventions: Document food/fluid/supplement intake    Friction and Shear Interventions: HOB 30 degrees or less                Problem: Patient Education: Go to Patient Education Activity  Goal: Patient/Family Education  Outcome: Progressing Towards Goal     Problem: Falls - Risk of  Goal: *Absence of Falls  Description: Document Jossy Fall Risk and appropriate interventions in the flowsheet. Outcome: Progressing Towards Goal  Note: Fall Risk Interventions:       Mentation Interventions: Evaluate medications/consider consulting pharmacy    Medication Interventions: Evaluate medications/consider consulting pharmacy    Elimination Interventions:  Toileting schedule/hourly rounds

## 2021-12-23 ENCOUNTER — APPOINTMENT (OUTPATIENT)
Dept: GENERAL RADIOLOGY | Age: 46
DRG: 720 | End: 2021-12-23
Attending: NURSE PRACTITIONER
Payer: MEDICAID

## 2021-12-23 PROBLEM — I82.409 DVT (DEEP VENOUS THROMBOSIS) (HCC): Status: ACTIVE | Noted: 2021-12-23

## 2021-12-23 LAB
ANION GAP SERPL CALC-SCNC: 12 MMOL/L (ref 5–15)
APTT PPP: 28.9 SEC (ref 21.2–34.1)
BASOPHILS # BLD: 0.1 K/UL (ref 0–0.1)
BASOPHILS # BLD: 0.1 K/UL (ref 0–0.1)
BASOPHILS NFR BLD: 1 % (ref 0–1)
BASOPHILS NFR BLD: 1 % (ref 0–1)
BUN SERPL-MCNC: 4 MG/DL (ref 6–20)
BUN/CREAT SERPL: 8 (ref 12–20)
CA-I BLD-MCNC: 8.3 MG/DL (ref 8.5–10.1)
CHLORIDE SERPL-SCNC: 103 MMOL/L (ref 97–108)
CO2 SERPL-SCNC: 21 MMOL/L (ref 21–32)
CREAT SERPL-MCNC: 0.53 MG/DL (ref 0.7–1.3)
DIFFERENTIAL METHOD BLD: ABNORMAL
DIFFERENTIAL METHOD BLD: ABNORMAL
EOSINOPHIL # BLD: 0.2 K/UL (ref 0–0.4)
EOSINOPHIL # BLD: 0.4 K/UL (ref 0–0.4)
EOSINOPHIL NFR BLD: 3 % (ref 0–7)
EOSINOPHIL NFR BLD: 5 % (ref 0–7)
ERYTHROCYTE [DISTWIDTH] IN BLOOD BY AUTOMATED COUNT: 13.2 % (ref 11.5–14.5)
ERYTHROCYTE [DISTWIDTH] IN BLOOD BY AUTOMATED COUNT: 13.2 % (ref 11.5–14.5)
GLUCOSE BLD STRIP.AUTO-MCNC: 77 MG/DL (ref 65–117)
GLUCOSE SERPL-MCNC: 78 MG/DL (ref 65–100)
HCT VFR BLD AUTO: 36.1 % (ref 36.6–50.3)
HCT VFR BLD AUTO: 37.6 % (ref 36.6–50.3)
HGB BLD-MCNC: 11.4 G/DL (ref 12.1–17)
HGB BLD-MCNC: 11.5 G/DL (ref 12.1–17)
IMM GRANULOCYTES # BLD AUTO: 0 K/UL (ref 0–0.04)
IMM GRANULOCYTES # BLD AUTO: 0 K/UL (ref 0–0.04)
IMM GRANULOCYTES NFR BLD AUTO: 0 % (ref 0–0.5)
IMM GRANULOCYTES NFR BLD AUTO: 0 % (ref 0–0.5)
LYMPHOCYTES # BLD: 1.4 K/UL (ref 0.8–3.5)
LYMPHOCYTES # BLD: 1.4 K/UL (ref 0.8–3.5)
LYMPHOCYTES NFR BLD: 16 % (ref 12–49)
LYMPHOCYTES NFR BLD: 17 % (ref 12–49)
MCH RBC QN AUTO: 30.2 PG (ref 26–34)
MCH RBC QN AUTO: 30.4 PG (ref 26–34)
MCHC RBC AUTO-ENTMCNC: 30.6 G/DL (ref 30–36.5)
MCHC RBC AUTO-ENTMCNC: 31.6 G/DL (ref 30–36.5)
MCV RBC AUTO: 95.8 FL (ref 80–99)
MCV RBC AUTO: 99.5 FL (ref 80–99)
MONOCYTES # BLD: 0.5 K/UL (ref 0–1)
MONOCYTES # BLD: 0.6 K/UL (ref 0–1)
MONOCYTES NFR BLD: 6 % (ref 5–13)
MONOCYTES NFR BLD: 7 % (ref 5–13)
NEUTS SEG # BLD: 5.8 K/UL (ref 1.8–8)
NEUTS SEG # BLD: 6.3 K/UL (ref 1.8–8)
NEUTS SEG NFR BLD: 70 % (ref 32–75)
NEUTS SEG NFR BLD: 74 % (ref 32–75)
NRBC # BLD: 0 K/UL (ref 0–0.01)
NRBC # BLD: 0 K/UL (ref 0–0.01)
NRBC BLD-RTO: 0 PER 100 WBC
NRBC BLD-RTO: 0 PER 100 WBC
PERFORMED BY, TECHID: NORMAL
PLATELET # BLD AUTO: 299 K/UL (ref 150–400)
PLATELET # BLD AUTO: 332 K/UL (ref 150–400)
PMV BLD AUTO: 10.2 FL (ref 8.9–12.9)
PMV BLD AUTO: 9.7 FL (ref 8.9–12.9)
POTASSIUM SERPL-SCNC: 4 MMOL/L (ref 3.5–5.1)
RBC # BLD AUTO: 3.77 M/UL (ref 4.1–5.7)
RBC # BLD AUTO: 3.78 M/UL (ref 4.1–5.7)
SODIUM SERPL-SCNC: 136 MMOL/L (ref 136–145)
THERAPEUTIC RANGE,PTTT: NORMAL SEC (ref 82–109)
WBC # BLD AUTO: 8.2 K/UL (ref 4.1–11.1)
WBC # BLD AUTO: 8.5 K/UL (ref 4.1–11.1)

## 2021-12-23 PROCEDURE — 85730 THROMBOPLASTIN TIME PARTIAL: CPT

## 2021-12-23 PROCEDURE — 74011250637 HC RX REV CODE- 250/637: Performed by: HOSPITALIST

## 2021-12-23 PROCEDURE — 80048 BASIC METABOLIC PNL TOTAL CA: CPT

## 2021-12-23 PROCEDURE — 71045 X-RAY EXAM CHEST 1 VIEW: CPT

## 2021-12-23 PROCEDURE — 36415 COLL VENOUS BLD VENIPUNCTURE: CPT

## 2021-12-23 PROCEDURE — 74011000258 HC RX REV CODE- 258: Performed by: HOSPITALIST

## 2021-12-23 PROCEDURE — 65270000029 HC RM PRIVATE

## 2021-12-23 PROCEDURE — 74011250636 HC RX REV CODE- 250/636: Performed by: HOSPITALIST

## 2021-12-23 PROCEDURE — 82962 GLUCOSE BLOOD TEST: CPT

## 2021-12-23 PROCEDURE — 74011250636 HC RX REV CODE- 250/636: Performed by: NURSE PRACTITIONER

## 2021-12-23 PROCEDURE — 74011250637 HC RX REV CODE- 250/637: Performed by: NURSE PRACTITIONER

## 2021-12-23 PROCEDURE — 74011000250 HC RX REV CODE- 250: Performed by: NURSE PRACTITIONER

## 2021-12-23 PROCEDURE — 99232 SBSQ HOSP IP/OBS MODERATE 35: CPT | Performed by: INTERNAL MEDICINE

## 2021-12-23 PROCEDURE — 85025 COMPLETE CBC W/AUTO DIFF WBC: CPT

## 2021-12-23 RX ORDER — HEPARIN SODIUM 1000 [USP'U]/ML
30 INJECTION, SOLUTION INTRAVENOUS; SUBCUTANEOUS AS NEEDED
Status: DISCONTINUED | OUTPATIENT
Start: 2021-12-23 | End: 2021-12-28

## 2021-12-23 RX ORDER — PREDNISOLONE ACETATE 10 MG/ML
1 SUSPENSION/ DROPS OPHTHALMIC 2 TIMES DAILY
Status: DISCONTINUED | OUTPATIENT
Start: 2021-12-23 | End: 2022-01-10 | Stop reason: HOSPADM

## 2021-12-23 RX ORDER — HEPARIN SODIUM 1000 [USP'U]/ML
60 INJECTION, SOLUTION INTRAVENOUS; SUBCUTANEOUS AS NEEDED
Status: DISCONTINUED | OUTPATIENT
Start: 2021-12-23 | End: 2021-12-28

## 2021-12-23 RX ORDER — HEPARIN SODIUM 10000 [USP'U]/100ML
12-25 INJECTION, SOLUTION INTRAVENOUS
Status: DISCONTINUED | OUTPATIENT
Start: 2021-12-23 | End: 2021-12-28

## 2021-12-23 RX ADMIN — CETIRIZINE HYDROCHLORIDE 10 MG: 10 TABLET, FILM COATED ORAL at 10:30

## 2021-12-23 RX ADMIN — PHENYTOIN 100 MG: 125 SUSPENSION ORAL at 10:29

## 2021-12-23 RX ADMIN — HEPARIN SODIUM AND DEXTROSE 12 UNITS/KG/HR: 10000; 5 INJECTION INTRAVENOUS at 16:12

## 2021-12-23 RX ADMIN — FOLIC ACID 1 MG: 1 TABLET ORAL at 10:29

## 2021-12-23 RX ADMIN — HEPARIN SODIUM 3540 UNITS: 1000 INJECTION, SOLUTION INTRAVENOUS; SUBCUTANEOUS at 17:00

## 2021-12-23 RX ADMIN — LAMOTRIGINE 300 MG: 100 TABLET ORAL at 23:25

## 2021-12-23 RX ADMIN — PIPERACILLIN AND TAZOBACTAM 3.38 G: 3; .375 INJECTION, POWDER, LYOPHILIZED, FOR SOLUTION INTRAVENOUS at 05:58

## 2021-12-23 RX ADMIN — SODIUM CHLORIDE 75 ML/HR: 9 INJECTION, SOLUTION INTRAVENOUS at 05:05

## 2021-12-23 RX ADMIN — APIXABAN 5 MG: 5 TABLET, FILM COATED ORAL at 10:30

## 2021-12-23 RX ADMIN — LAMOTRIGINE 300 MG: 100 TABLET ORAL at 10:30

## 2021-12-23 RX ADMIN — PIPERACILLIN AND TAZOBACTAM 3.38 G: 3; .375 INJECTION, POWDER, LYOPHILIZED, FOR SOLUTION INTRAVENOUS at 14:38

## 2021-12-23 RX ADMIN — PIPERACILLIN AND TAZOBACTAM 3.38 G: 3; .375 INJECTION, POWDER, LYOPHILIZED, FOR SOLUTION INTRAVENOUS at 23:23

## 2021-12-23 RX ADMIN — MIDODRINE HYDROCHLORIDE 5 MG: 5 TABLET ORAL at 10:30

## 2021-12-23 RX ADMIN — PHENYTOIN 130 MG: 125 SUSPENSION ORAL at 23:24

## 2021-12-23 RX ADMIN — HEPARIN SODIUM AND DEXTROSE 16 UNITS/KG/HR: 10000; 5 INJECTION INTRAVENOUS at 18:01

## 2021-12-23 RX ADMIN — Medication 10 ML: at 23:24

## 2021-12-23 RX ADMIN — KETOTIFEN FUMARATE 1 DROP: 0.35 SOLUTION/ DROPS OPHTHALMIC at 23:23

## 2021-12-23 RX ADMIN — LEVETIRACETAM 1000 MG: 10 INJECTION, SOLUTION INTRAVENOUS at 10:27

## 2021-12-23 RX ADMIN — LACTULOSE 15 ML: 10 SOLUTION ORAL at 09:00

## 2021-12-23 RX ADMIN — KETOTIFEN FUMARATE 1 DROP: 0.35 SOLUTION/ DROPS OPHTHALMIC at 10:35

## 2021-12-23 RX ADMIN — PREDNISOLONE ACETATE 1 DROP: 10 SUSPENSION/ DROPS OPHTHALMIC at 23:22

## 2021-12-23 RX ADMIN — Medication 10 ML: at 18:16

## 2021-12-23 RX ADMIN — Medication 10 ML: at 05:59

## 2021-12-23 RX ADMIN — MIDODRINE HYDROCHLORIDE 5 MG: 5 TABLET ORAL at 17:00

## 2021-12-23 NOTE — PROGRESS NOTES
Progress Note    Patient: Monse Rivera MRN: [id number]  SSN: [id number]    YOB: 1975  Age: 55 y.o. Sex: male      Admit Date: 12/17/2021    LOS: 6 days     Subjective:   GI in consultation for peg tube placement    Patient seen status post NG tube placement for nutrition. Mother at the bedside. Discussed peg tube placement on Monday 12/27. Mother is in agreement. His eliquis is on hold per hematology recommendation and bridging with  heparin drip. Hold heparin 4 hours prior to peg tube placement. Then eliquis may be restarted 12 hours after procedure. Hematology input appreciated. Discussed with Judy Camacho NP. History of Present Illness: Monse Rivera is a 55 y.o. male who is seen in consultation for peg tube placement. Mr. Nicholaus Najjar has a history of intellectual disability and medical history obtained from patient's mother Vera Rizvi and medical records. He does reside in a [address] [place] [address] and has been in the current home for less than a week per patient's mother. He is bedridden requiring total care. He has been on a soft diet prior to admission. He was sent to the ED with complaints of vomiting and lethargy. He has had poor oral intake and reports of no bowel movements even after having an enema. The staff report he did vomit multiple times. His baseline function is a few word and able to follow simple commands. He does have bilateral contracted hands. While in the ED he was found to have increased temperature, hypertension, and tachycardia meeting sepsis criteria. His chest x-ray shows suspected aspiration pneumonia. He is on IV zosyn and followed by infectious Disease. CT of the abdomen and pelvis shows nonspecific abnormalities but no significant intra-abdominal pathology. He has a past medical history significant for Intellectual disability, seizure disorder, DVT in the past and was on xarelto 10 mg preventative.  He was diagnosed on admission with acute thrombus present in the left external iliac vein, left common femoral vein, and left proximal femoral vein. CTA of chest shows extensive bilateral reticulonodular infiltrated throughout both lungs. No evidence of Pulmonary Embolus. Spoke at length with patient's mother Bita Verma and discussed peg tube placement. All questions answered at this time. Patient's mother is in agreement with the peg tube. Discussed patient is currently on the Eliquis for a new diagnosis of DVT in the left leg will need to consult hematology for possible heparin drip prior to peg tube placement. Plan for possible placement on Monday based on hematology's recommendations.      CTA chest 12/21/2021: IMPRESSION  1. Extensive bilateral reticulonodular infiltrates throughout both lungs similar  to the previous study although both lungs were not included in their entirety on the previous study. Findings may represent infectious, inflammatory or  neoplastic process. Recommend follow-up to. No evidence of pulmonary embolus, aortic aneurysm or aortic dissection. 3. Borderline enlarged gastrohepatic lymph node. 4. Thickened esophagus, recommend clinical evaluation        Duplex lower extremities 12/20/2021:   · No evidence of acute deep vein thrombosis in the right common femoral, femoral, popliteal, posterior tibial, and peroneal veins. The veins were imaged in the transverse and longitudinal planes. The vessels showed normal color filling and compressibility. Doppler interrogation showed phasic and spontaneous flow. · Acute thrombus present in the left external iliac vein. · Acute thrombus present in the left common femoral vein. · Acute thrombus present in the left proximal femoral vein.     CT abdomen/pelvis 12/17/2021:   IMPRESSION     1. Small focus of intraluminal gas within the urinary bladder. Please correlate for recent instrumentation or other causes of intraluminal gas, including an infectious process.   2. Somewhat reticular nodular opacities in the lung bases, suboptimally  evaluated due to respiratory motion. Some of these have what appears to be a tree and bud morphology, suggesting either chronic aspiration or an infectious or inflammatory small airways disease. Otherwise a fibrotic process should be considered. This would be better characterized with dedicated chest CT as clinically warranted. 3. Underdistention versus circumferential wall thickening of the distal  Esophagus. 4. Probable left trochanteric bursitis with sterility of the fluid  Indeterminate. 5. Probable myositis ossificans surrounding the left hip. Please correlate for history of prior trauma. 6. Enlarged lymph nodes in the upper abdomen, nonspecific. Comparison withoutside imaging would be helpful, if available, to determine longer term stability. Otherwise short interval follow-up in 3 months is recommended.        Selene Doherty (mother) [phone]  Gary Toledo (father) [phone]      Objective:     Vitals:    12/22/21 2042 12/23/21 0125 12/23/21 0833 12/23/21 1433   BP: (!) 104/59 96/60 105/70 112/73   Pulse: 99 99 88 95   Resp: 19 18 18 18   Temp: 98.9 °F (37.2 °C) 98.3 °F (36.8 °C) 98.3 °F (36.8 °C) 98.3 °F (36.8 °C)   SpO2: 94% 93%  95%   Weight:       Height:            Intake and Output:  Current Shift: No intake/output data recorded. Last three shifts: No intake/output data recorded. Physical Exam:   Skin:  Extremities and face reveal no rashes. No mark erythema. HEENT: Sclerae anicteric. Extra-occular muscles are intact. No abnormal pigmentation of the lips. The neck is supple. Cardiovascular: Regular rate and rhythm. Respiratory:  Comfortable breathing with no accessory muscle use. GI:  Abdomen nondistended, soft, and nontender. No enlargement of the liver or spleen. No masses palpable.   Rectal:  Deferred  Musculoskeletal: Weak  Neurological:  Intellectual disability  Psychiatric:  Alert Non verbal  Lymphatic:  No visible adenopathy      Lab/Data Review:  Recent Results (from the past 24 hour(s))   CBC WITH AUTOMATED DIFF    Collection Time: 12/23/21 10:47 AM   Result Value Ref Range    WBC 8.5 4.1 - 11.1 K/uL    RBC 3.78 (L) 4.10 - 5.70 M/uL    HGB 11.5 (L) 12.1 - 17.0 g/dL    HCT 37.6 36.6 - 50.3 %    MCV 99.5 (H) 80.0 - 99.0 FL    MCH 30.4 26.0 - 34.0 PG    MCHC 30.6 30.0 - 36.5 g/dL    RDW 13.2 11.5 - 14.5 %    PLATELET 361 830 - 545 K/uL    MPV 9.7 8.9 - 12.9 FL    NRBC 0.0 0.0  WBC    ABSOLUTE NRBC 0.00 0.00 - 0.01 K/uL    NEUTROPHILS 74 32 - 75 %    LYMPHOCYTES 16 12 - 49 %    MONOCYTES 6 5 - 13 %    EOSINOPHILS 3 0 - 7 %    BASOPHILS 1 0 - 1 %    IMMATURE GRANULOCYTES 0 0 - 0.5 %    ABS. NEUTROPHILS 6.3 1.8 - 8.0 K/UL    ABS. LYMPHOCYTES 1.4 0.8 - 3.5 K/UL    ABS. MONOCYTES 0.5 0.0 - 1.0 K/UL    ABS. EOSINOPHILS 0.2 0.0 - 0.4 K/UL    ABS. BASOPHILS 0.1 0.0 - 0.1 K/UL    ABS. IMM. GRANS. 0.0 0.00 - 0.04 K/UL    DF AUTOMATED     METABOLIC PANEL, BASIC    Collection Time: 12/23/21 10:47 AM   Result Value Ref Range    Sodium 136 136 - 145 mmol/L    Potassium 4.0 3.5 - 5.1 mmol/L    Chloride 103 97 - 108 mmol/L    CO2 21 21 - 32 mmol/L    Anion gap 12 5 - 15 mmol/L    Glucose 78 65 - 100 mg/dL    BUN 4 (L) 6 - 20 mg/dL    Creatinine 0.53 (L) 0.70 - 1.30 mg/dL    BUN/Creatinine ratio 8 (L) 12 - 20      GFR est AA >60 >60 ml/min/1.73m2    GFR est non-AA >60 >60 ml/min/1.73m2    Calcium 8.3 (L) 8.5 - 10.1 mg/dL              CTA CHEST W OR W WO CONT   Final Result   1. Extensive bilateral reticulonodular infiltrates throughout both lungs similar   to the previous study although both lungs were not included in their entirety on   the previous study. Findings may represent infectious, inflammatory or   neoplastic process. Recommend follow-up to. No evidence of pulmonary embolus,   aortic aneurysm or aortic dissection. 3. Borderline enlarged gastrohepatic lymph node. 4. Thickened esophagus, recommend clinical evaluation.       DUPLEX LOWER EXT VENOUS BILAT   Final Result      XR CHEST PORT   Final Result   No significant interval change. CT ABD PELV W CONT   Final Result      1. Small focus of intraluminal gas within the urinary bladder. Please correlate   for recent instrumentation or other causes of intraluminal gas, including an   infectious process. 2. Somewhat reticular nodular opacities in the lung bases, suboptimally   evaluated due to respiratory motion. Some of these have what appears to be a   tree and bud morphology, suggesting either chronic aspiration or an infectious   or inflammatory small airways disease. Otherwise a fibrotic process should be   considered. This would be better characterized with dedicated chest CT as   clinically warranted. 3. Underdistention versus circumferential wall thickening of the distal   esophagus. 4. Probable left trochanteric bursitis with sterility of the fluid   indeterminate. 5. Probable myositis ossificans surrounding the left hip. Please correlate for   history of prior trauma. 6. Enlarged lymph nodes in the upper abdomen, nonspecific. Comparison with   outside imaging would be helpful, if available, to determine longer term   stability. Otherwise short interval follow-up in 3 months is recommended. XR CHEST SNGL V   Final Result   Patchy opacities within the lungs, nonspecific, but leading differential   considerations include infection or pulmonary edema. Superimposed acute disease   on an underlying chronic interstitial process is also possible. Radiographic   follow-up is recommended to exclude other causes of opacification. XR ABD (KUB)   Final Result   Mildly prominent gas-filled loops of bowel within the central abdomen with   overall nonobstructive bowel gas pattern.       XR ABD (KUB)    (Results Pending)   XR CHEST PORT    (Results Pending)       Assessment:     Active Problems:    Sepsis ([address] (12/17/2021)      Aspiration pneumonia ([address] (12/17/2021)      Respiratory failure with hypoxia ([facility] (12/21/2021)      DVT (deep venous thrombosis) ([facility] [address] (12/23/2021)        Plan:   1. Dysphagia      EGD with peg tube placement Monday 12/27/21      NPO after midnight on 12/27/21      Mother in agreement for peg tube placement      NG tube for feeding      Feeding per nutritionist recommendation      Eliquis on hold bridging with heparin      Hold heparin 4 hours prior to peg tube placement      May restart Eliquis 12 hours post peg placement      Hematology input appreciated  2. Sepsis 2/2 aspiration pneumonia      Infectious Disease input appreciated      Continue IV zosyn      Currently afebrile        Currently on room air  3. Seizure disorder      Neurology input appreciated      Continue home medications  4. Acute Left leg DVT     Eliquis on hold bridging with heparin 2/2 to peg tube placement     May restart Eliquis 12 hours post peg placement per hematology recommendation. Hematology input appreciated       Thank you for allowing me to participate in this patients care. Plan discussed with Dr. Stephanie Santo and he approves.     Signed By: Jyothi Bowers NP     December 23, 2021

## 2021-12-23 NOTE — PROGRESS NOTES
Hematology/Oncology   Progress Note    Patient: Cole Richardson MRN: [id number]     YOB: 1975  Age: 55 y.o. Sex: male      Admit Date: 12/17/2021    LOS: 6 days     Chief Complaint: Admitted with an altered mental status    Subjective:     Unable to provide any history.   Nonverbal    ROS: Could not be obtained due to mental status    Current Facility-Administered Medications:     prednisoLONE acetate (PRED FORTE) 1 % ophthalmic suspension 1 Drop, 1 Drop, Left Eye, BID, Reilly Olivera NP    heparin 25,000 units in D5W 250 ml infusion, 12-25 Units/kg/hr, IntraVENous, TITRATE, Reilly Olivera NP, Last Rate: 9.4 mL/hr at 12/23/21 1801, 16 Units/kg/hr at 12/23/21 1801    heparin (porcine) 1,000 unit/mL injection 3,540 Units, 60 Units/kg, IntraVENous, PRN, 3,540 Units at 12/23/21 1700 **OR** heparin (porcine) 1,000 unit/mL injection 1,770 Units, 30 Units/kg, IntraVENous, PRN, Reilly Olivera NP    midodrine (PROAMATINE) tablet 5 mg, 5 mg, Oral, BID WITH MEALS, Reilly Olivera NP, 5 mg at 12/23/21 1700    [Held by provider] apixaban (ELIQUIS) tablet 5 mg, 5 mg, Oral, BID, Brittany Tanner MD, 5 mg at 12/23/21 1030    0.9% sodium chloride infusion, 75 mL/hr, IntraVENous, CONTINUOUS, Brittany Tanner MD, Last Rate: 75 mL/hr at 12/23/21 0505, 75 mL/hr at 12/23/21 0505    levETIRAcetam in saline (iso-os) (KEPPRA) infusion 1,000 mg, 1,000 mg, IntraVENous, BID, Aiyana Santiago MD, Last Rate: 400 mL/hr at 12/18/21 2109, 1,000 mg at 12/23/21 1027    acetaminophen (TYLENOL) suppository 650 mg, 650 mg, Rectal, Q4H PRN, Aiyana Santiago MD, 650 mg at 12/19/21 1602    ondansetron (ZOFRAN) injection 4 mg, 4 mg, IntraVENous, Q6H PRN, Aiyana Santiago MD, 4 mg at 12/19/21 2117    phenytoin (DILANTIN) 100 mg/4 mL oral suspension 100 mg, 100 mg, Oral, DAILY, Aiyana Santiago MD, 100 mg at 12/23/21 1029    phenytoin (DILANTIN) 100 mg/4 mL oral suspension 130 mg, 130 mg, Oral, QHS, Paola Benitez MD, 130 mg at 12/22/21 2227    0.9% sodium chloride infusion 1,000 mL, 1,000 mL, IntraVENous, CONTINUOUS, Bobbi Andrea MD, 1,000 mL at 12/17/21 1952    0.9% sodium chloride infusion 1,000 mL, 1,000 mL, IntraVENous, CONTINUOUS, Bobbi Andrea MD, 1,000 mL at 12/20/21 1652    cetirizine (ZYRTEC) tablet 10 mg, 10 mg, Oral, DAILY, Paola Benitez MD, 10 mg at 83/92/09 0334    folic acid (FOLVITE) tablet 1 mg, 1 mg, Oral, DAILY, Paola Benitez MD, 1 mg at 12/23/21 1029    ketotifen (ZADITOR) 0.025 % (0.035 %) ophthalmic solution 1 Drop, 1 Drop, Both Eyes, BID, Paola Benitez MD, 1 Drop at 12/23/21 1035    lactulose (CHRONULAC) 10 gram/15 mL solution 15 mL, 10 g, Oral, DAILY, Paola Benitez MD, 15 mL at 12/23/21 0900    lamoTRIgine (LaMICtal) tablet 300 mg, 300 mg, Oral, BID, Paola Benitez MD, 300 mg at 12/23/21 1030    psyllium husk-aspartame (METAMUCIL FIBER) packet 1 Packet, 1 Packet, Oral, DAILY, Paola Benitez MD, 1 Packet at 12/21/21 0919    sodium chloride (NS) flush 5-40 mL, 5-40 mL, IntraVENous, Q8H, Paola Benitez MD, 10 mL at 12/23/21 1816    sodium chloride (NS) flush 5-40 mL, 5-40 mL, IntraVENous, PRN, Paola Benitez MD    acetaminophen (TYLENOL) tablet 650 mg, 650 mg, Oral, Q4H PRN, Paola Benitez MD, 650 mg at 12/19/21 2155    albuterol (PROVENTIL HFA, VENTOLIN HFA, PROAIR HFA) inhaler 2 Puff, 2 Puff, Inhalation, Q4H PRN, Paola Benitez MD    piperacillin-tazobactam (ZOSYN) 3.375 g in 0.9% sodium chloride (MBP/ADV) 100 mL MBP, 3.375 g, IntraVENous, Q8H, Paola Benitez MD, Last Rate: 25 mL/hr at 12/23/21 1438, 3.375 g at 12/23/21 1438     Objective:     Vitals:    12/22/21 2042 12/23/21 0125 12/23/21 0833 12/23/21 1433   BP: (!) 104/59 96/60 105/70 112/73   Pulse: 99 99 88 95   Resp: 19 18 18 18   Temp: 98.9 °F (37.2 °C) 98.3 °F (36.8 °C) 98.3 °F (36.8 °C) 98.3 °F (36.8 °C)   SpO2: 94% 93%  95%   Weight:       Height:              Physical Exam:   Constitutional  ill-appearing. Nonverbal   Head Normocephalic; no scars   Eyes Conjunctivae and sclerae are clear and without icterus. Pupils are reactive and equal.   ENMT Sinuses are nontender. No oral exudates, ulcers, masses, thrush or mucositis. Oropharynx clear. Tongue normal.   Neck Supple without masses or thyromegaly. No jugular venous distension. Hematologic/Lymphatic No petechiae or purpura. No tender or palpable lymph nodes in the cervical, supraclavicular, axillary or inguinal area. Respiratory Lungs are clear to auscultation without rhonchi or wheezing. Cardiovascular Regular rate and rhythm of heart without murmurs, gallops or rubs. Chest / Line Site Chest is symmetric with no chest wall deformities. Abdomen Non-tender, non-distended, no masses, ascites or hepatosplenomegaly. Good bowel sounds. No guarding or rebound tenderness. No pulsatile masses. Musculoskeletal No tenderness or swelling, normal range of motion without obvious weakness. Extremities No visible deformities, no cyanosis, clubbing or edema. Skin No rashes, scars, or lesions suggestive of malignancy. No petechiae, purpura, or ecchymoses. No excoriations. Neurologic  nonverbal.  Cannot follow commands       Lab/Data Review:  Recent Labs     12/23/21  1607 12/23/21  1047 12/22/21  0908   WBC 8.2 8.5 6.8   HGB 11.4* 11.5* 11.2*   HCT 36.1* 37.6 35.7*    332 312     Recent Labs     12/23/21  1047 12/22/21  0908    141   K 4.0 3.2*    107   CO2 21 25   GLU 78 81   BUN 4* 5*   CREA 0.53* 0.62*   CA 8.3* 8.6     No results for input(s): PH, PCO2, PO2, HCO3, FIO2 in the last 72 hours.   Recent Results (from the past 24 hour(s))   CBC WITH AUTOMATED DIFF    Collection Time: 12/23/21 10:47 AM   Result Value Ref Range    WBC 8.5 4.1 - 11.1 K/uL    RBC 3.78 (L) 4.10 - 5.70 M/uL    HGB 11.5 (L) 12.1 - 17.0 g/dL    HCT 37.6 36.6 - 50.3 %    MCV 99.5 (H) 80.0 - 99.0 FL    MCH 30.4 26.0 - 34.0 PG    MCHC 30.6 30.0 - 36.5 g/dL    RDW 13.2 11.5 - 14.5 %    PLATELET 930 960 - 997 K/uL    MPV 9.7 8.9 - 12.9 FL    NRBC 0.0 0.0  WBC    ABSOLUTE NRBC 0.00 0.00 - 0.01 K/uL    NEUTROPHILS 74 32 - 75 %    LYMPHOCYTES 16 12 - 49 %    MONOCYTES 6 5 - 13 %    EOSINOPHILS 3 0 - 7 %    BASOPHILS 1 0 - 1 %    IMMATURE GRANULOCYTES 0 0 - 0.5 %    ABS. NEUTROPHILS 6.3 1.8 - 8.0 K/UL    ABS. LYMPHOCYTES 1.4 0.8 - 3.5 K/UL    ABS. MONOCYTES 0.5 0.0 - 1.0 K/UL    ABS. EOSINOPHILS 0.2 0.0 - 0.4 K/UL    ABS. BASOPHILS 0.1 0.0 - 0.1 K/UL    ABS. IMM.  GRANS. 0.0 0.00 - 0.04 K/UL    DF AUTOMATED     METABOLIC PANEL, BASIC    Collection Time: 12/23/21 10:47 AM   Result Value Ref Range    Sodium 136 136 - 145 mmol/L    Potassium 4.0 3.5 - 5.1 mmol/L    Chloride 103 97 - 108 mmol/L    CO2 21 21 - 32 mmol/L    Anion gap 12 5 - 15 mmol/L    Glucose 78 65 - 100 mg/dL    BUN 4 (L) 6 - 20 mg/dL    Creatinine 0.53 (L) 0.70 - 1.30 mg/dL    BUN/Creatinine ratio 8 (L) 12 - 20      GFR est AA >60 >60 ml/min/1.73m2    GFR est non-AA >60 >60 ml/min/1.73m2    Calcium 8.3 (L) 8.5 - 10.1 mg/dL   GLUCOSE, POC    Collection Time: 12/23/21  3:48 PM   Result Value Ref Range    Glucose (POC) 77 65 - 117 mg/dL    Performed by Andreas Welsh    PTT    Collection Time: 12/23/21  3:58 PM   Result Value Ref Range    aPTT 28.9 21.2 - 34.1 sec    aPTT, therapeutic range   82 - 109 sec   CBC WITH AUTOMATED DIFF    Collection Time: 12/23/21  4:07 PM   Result Value Ref Range    WBC 8.2 4.1 - 11.1 K/uL    RBC 3.77 (L) 4.10 - 5.70 M/uL    HGB 11.4 (L) 12.1 - 17.0 g/dL    HCT 36.1 (L) 36.6 - 50.3 %    MCV 95.8 80.0 - 99.0 FL    MCH 30.2 26.0 - 34.0 PG    MCHC 31.6 30.0 - 36.5 g/dL    RDW 13.2 11.5 - 14.5 %    PLATELET 177 112 - 928 K/uL    MPV 10.2 8.9 - 12.9 FL    NRBC 0.0 0.0  WBC    ABSOLUTE NRBC 0.00 0.00 - 0.01 K/uL    NEUTROPHILS 70 32 - 75 %    LYMPHOCYTES 17 12 - 49 %    MONOCYTES 7 5 - 13 %    EOSINOPHILS 5 0 - 7 %    BASOPHILS 1 0 - 1 %    IMMATURE GRANULOCYTES 0 0 - 0.5 %    ABS. NEUTROPHILS 5.8 1.8 - 8.0 K/UL    ABS. LYMPHOCYTES 1.4 0.8 - 3.5 K/UL    ABS. MONOCYTES 0.6 0.0 - 1.0 K/UL    ABS. EOSINOPHILS 0.4 0.0 - 0.4 K/UL    ABS. BASOPHILS 0.1 0.0 - 0.1 K/UL    ABS. IMM. GRANS. 0.0 0.00 - 0.04 K/UL    DF AUTOMATED          Radiology:   CT Results  (Last 48 hours)    None           Assessment and Plan: Active Problems:    Sepsis ([facility] [address] (12/17/2021)      Aspiration pneumonia ([facility] [address] (12/17/2021)      Respiratory failure with hypoxia ([facility] [address] (12/21/2021)      DVT (deep venous thrombosis) ([facility] [address] (12/23/2021)    Left lower extremity DVT:  -Reportedly with prior history of DVT and had been on Xarelto 10 mg daily as maintenance.  -Noted to have extensive proximal left lower extremity DVT and started on Eliquis.  -For upcoming PEG tube placement would recommend discontinuation of Eliquis at least 3 days before the procedure and start bridging heparin drip which can be held at the time of procedure.  -We will be able to restart Eliquis at least 12 hours after the procedure. We will sign off. Feel free to call us with any questions or concerns.       Signed By: Hanh Castorena MD     December 23, 2021

## 2021-12-23 NOTE — PROGRESS NOTES
Hospitalist Progress Note    Subjective:   Daily Progress Note: 12/23/2021 4:21 PM    Hospital Course: The patient is a 54-year-old male with a PMH significant for intellectual disabilities, seizure disorder, quadriplegia. Lives in a group home. Caregivers state he had not been eating or drinking for 4 days or having bowel movements. Unsuccessful enema. Then he started vomiting several times. Brought to the emergency room he was hypertensive, tachycardic and altered from baseline. Chest x-ray consistent with aspiration pneumonia. He presents tachycardic and febrile with hypertension. Admitted for further management of sepsis, aspiration pneumonia, respiratory failure with hypoxia, breakthrough seizure activity. Blood pressure decreasing required ICU admission and septic shock and started on Levophed. Started on vancomycin and Zosyn for aspiration pneumonia. Required nasal cannula oxygen for acute hypoxic respiratory failure. Weaned off pressor therapy and transferred to medical floor for further management. Left leg Doppler study positive for left external iliac vein thrombosis, left common femoral vein thrombosis and left proximal femoral vein thrombosis. He was started on Eliquis. Decreased antibiotic to monotherapy IV Zosyn. Started on Ketotifen-eyedrop for left sclera edema. Due to high risk of aspiration secondary to chronic disabilities and anatomical swallowing weakness, inability to maintain hydration and nutrition status he would benefit from feeding tube placement. GI has been consulted. Subjective: Follow up patient at the bedside. Unable to answer questions due to chronic baseline intellectual deficits. The mother is at the bedside requesting nutrition to start via NG tube today. Also requesting steroid eyedrop for reddened right eye.     Current Facility-Administered Medications   Medication Dose Route Frequency    prednisoLONE acetate (PRED FORTE) 1 % ophthalmic suspension 1 Drop  1 Drop Left Eye BID    midodrine (PROAMATINE) tablet 5 mg  5 mg Oral BID WITH MEALS    apixaban (ELIQUIS) tablet 5 mg  5 mg Oral BID    0.9% sodium chloride infusion  75 mL/hr IntraVENous CONTINUOUS    levETIRAcetam in saline (iso-os) (KEPPRA) infusion 1,000 mg  1,000 mg IntraVENous BID    acetaminophen (TYLENOL) suppository 650 mg  650 mg Rectal Q4H PRN    ondansetron (ZOFRAN) injection 4 mg  4 mg IntraVENous Q6H PRN    phenytoin (DILANTIN) 100 mg/4 mL oral suspension 100 mg  100 mg Oral DAILY    phenytoin (DILANTIN) 100 mg/4 mL oral suspension 130 mg  130 mg Oral QHS    0.9% sodium chloride infusion 1,000 mL  1,000 mL IntraVENous CONTINUOUS    0.9% sodium chloride infusion 1,000 mL  1,000 mL IntraVENous CONTINUOUS    cetirizine (ZYRTEC) tablet 10 mg  10 mg Oral DAILY    folic acid (FOLVITE) tablet 1 mg  1 mg Oral DAILY    ketotifen (ZADITOR) 0.025 % (0.035 %) ophthalmic solution 1 Drop  1 Drop Both Eyes BID    lactulose (CHRONULAC) 10 gram/15 mL solution 15 mL  10 g Oral DAILY    lamoTRIgine (LaMICtal) tablet 300 mg  300 mg Oral BID    psyllium husk-aspartame (METAMUCIL FIBER) packet 1 Packet  1 Packet Oral DAILY    sodium chloride (NS) flush 5-40 mL  5-40 mL IntraVENous Q8H    sodium chloride (NS) flush 5-40 mL  5-40 mL IntraVENous PRN    acetaminophen (TYLENOL) tablet 650 mg  650 mg Oral Q4H PRN    albuterol (PROVENTIL HFA, VENTOLIN HFA, PROAIR HFA) inhaler 2 Puff  2 Puff Inhalation Q4H PRN    piperacillin-tazobactam (ZOSYN) 3.375 g in 0.9% sodium chloride (MBP/ADV) 100 mL MBP  3.375 g IntraVENous Q8H        REVIEW OF SYSTEMS    Review of Systems   Unable to perform ROS: Mental acuity        Objective:     Visit Vitals  /70 (BP 1 Location: Right lower arm, BP Patient Position: Lying right side)   Pulse 88   Temp 98.3 °F (36.8 °C)   Resp 18   Ht 5' (1.524 m)   Wt 59 kg (130 lb)   SpO2 93%   BMI 25.39 kg/m²    O2 Flow Rate (L/min): 0 l/min O2 Device: None (Room air)    Temp (24hrs), Av.5 °F (36.9 °C), Min:98.3 °F (36.8 °C), Max:98.9 °F (37.2 °C)      No intake/output data recorded. No intake/output data recorded. PHYSICAL EXAM:    Physical Exam  Constitutional:       Appearance: He is ill-appearing. HENT:      Nose: Congestion present. Cardiovascular:      Rate and Rhythm: Regular rhythm. Tachycardia present. Pulmonary:      Effort: No respiratory distress. Breath sounds: Rhonchi present. Abdominal:      General: Bowel sounds are normal.   Musculoskeletal:         General: Deformity present. Comments: Contracted, quadriplegic, bedbound   Skin:     Coloration: Skin is pale. Neurological:      Mental Status: Mental status is at baseline. Comments: Aphasic with intellectual disabilities          Data Review    Recent Results (from the past 24 hour(s))   CBC WITH AUTOMATED DIFF    Collection Time: 21 10:47 AM   Result Value Ref Range    WBC 8.5 4.1 - 11.1 K/uL    RBC 3.78 (L) 4.10 - 5.70 M/uL    HGB 11.5 (L) 12.1 - 17.0 g/dL    HCT 37.6 36.6 - 50.3 %    MCV 99.5 (H) 80.0 - 99.0 FL    MCH 30.4 26.0 - 34.0 PG    MCHC 30.6 30.0 - 36.5 g/dL    RDW 13.2 11.5 - 14.5 %    PLATELET 625 048 - 733 K/uL    MPV 9.7 8.9 - 12.9 FL    NRBC 0.0 0.0  WBC    ABSOLUTE NRBC 0.00 0.00 - 0.01 K/uL    NEUTROPHILS 74 32 - 75 %    LYMPHOCYTES 16 12 - 49 %    MONOCYTES 6 5 - 13 %    EOSINOPHILS 3 0 - 7 %    BASOPHILS 1 0 - 1 %    IMMATURE GRANULOCYTES 0 0 - 0.5 %    ABS. NEUTROPHILS 6.3 1.8 - 8.0 K/UL    ABS. LYMPHOCYTES 1.4 0.8 - 3.5 K/UL    ABS. MONOCYTES 0.5 0.0 - 1.0 K/UL    ABS. EOSINOPHILS 0.2 0.0 - 0.4 K/UL    ABS. BASOPHILS 0.1 0.0 - 0.1 K/UL    ABS. IMM.  GRANS. 0.0 0.00 - 0.04 K/UL    DF AUTOMATED     METABOLIC PANEL, BASIC    Collection Time: 21 10:47 AM   Result Value Ref Range    Sodium 136 136 - 145 mmol/L    Potassium 4.0 3.5 - 5.1 mmol/L    Chloride 103 97 - 108 mmol/L    CO2 21 21 - 32 mmol/L    Anion gap 12 5 - 15 mmol/L    Glucose 78 65 - 100 mg/dL    BUN 4 (L) 6 - 20 mg/dL    Creatinine 0.53 (L) 0.70 - 1.30 mg/dL    BUN/Creatinine ratio 8 (L) 12 - 20      GFR est AA >60 >60 ml/min/1.73m2    GFR est non-AA >60 >60 ml/min/1.73m2    Calcium 8.3 (L) 8.5 - 10.1 mg/dL       CTA CHEST W OR W WO CONT   Final Result   1. Extensive bilateral reticulonodular infiltrates throughout both lungs similar   to the previous study although both lungs were not included in their entirety on   the previous study. Findings may represent infectious, inflammatory or   neoplastic process. Recommend follow-up to. No evidence of pulmonary embolus,   aortic aneurysm or aortic dissection. 3. Borderline enlarged gastrohepatic lymph node. 4. Thickened esophagus, recommend clinical evaluation. DUPLEX LOWER EXT VENOUS BILAT   Final Result      XR CHEST PORT   Final Result   No significant interval change. CT ABD PELV W CONT   Final Result      1. Small focus of intraluminal gas within the urinary bladder. Please correlate   for recent instrumentation or other causes of intraluminal gas, including an   infectious process. 2. Somewhat reticular nodular opacities in the lung bases, suboptimally   evaluated due to respiratory motion. Some of these have what appears to be a   tree and bud morphology, suggesting either chronic aspiration or an infectious   or inflammatory small airways disease. Otherwise a fibrotic process should be   considered. This would be better characterized with dedicated chest CT as   clinically warranted. 3. Underdistention versus circumferential wall thickening of the distal   esophagus. 4. Probable left trochanteric bursitis with sterility of the fluid   indeterminate. 5. Probable myositis ossificans surrounding the left hip. Please correlate for   history of prior trauma. 6. Enlarged lymph nodes in the upper abdomen, nonspecific. Comparison with   outside imaging would be helpful, if available, to determine longer term   stability. Otherwise short interval follow-up in 3 months is recommended. XR CHEST SNGL V   Final Result   Patchy opacities within the lungs, nonspecific, but leading differential   considerations include infection or pulmonary edema. Superimposed acute disease   on an underlying chronic interstitial process is also possible. Radiographic   follow-up is recommended to exclude other causes of opacification. XR ABD (KUB)   Final Result   Mildly prominent gas-filled loops of bowel within the central abdomen with   overall nonobstructive bowel gas pattern. XR ABD (KUB)    (Results Pending)       Active Problems:    Sepsis ([address] (12/17/2021)      Aspiration pneumonia ([address] (12/17/2021)      Respiratory failure with hypoxia ([address] (12/21/2021)        Assessment/Plan:       Respiratory failure with hypoxia  Aspiration pneumonia  Continue oxygen as needed to maintain saturations greater than 92%: Currently on 3 L  Continues on IV Zosyn  ID following  Speech evaluation pending  GI consulted for feeding tube placement due to high risk aspiration and inability to meet nutrition and hydration needs scheduled for Monday  NG tube placement to start feeding until PEG tube is placed    Septic shockresolved  Off pressors    Seizure disorder  Continue home medication at same dose most likely subtherapeutic levels caused from med med interaction  Continue Keppra infusion 1000 mg twice daily  Dilantin 100 mg daily, 130 mg at bedtime  Neurology following    Intellectual disabilities  Quadriplegia  Plan return to group facility when stable    DVTs  Doppler study positive for left external iliac vein, left common femoral vein and left proximal femoral vein thrombosis  Per oncology hematology consult will hold Eliquis for pending PEG tube placement and start heparin bridge therapy.   Eliquis can be restarted 12 hours after PEG tube placement  Heparin low-dose drip    Hypotension  Started on midodrine twice daily 5 mg    Hypokalemia  Replete and monitor    DVT Prophylaxis: Eliquis  Code Status: Full Code  POA/NOK:    Disposition and discharge barriers:   · Continue treatment for aspiration pneumonia including IV antibiotics and oxygen  · GI and nutrition consulted for feeding tube placement scheduled for Monday  · Plan return to [facility] home when stable  Care Plan discussed with: Mother, RN and IDR team  _____________________________________________________________________________  Time spent in direct care including coordination of service, review of data and examination: > 35 minutes    ______________________________________________________________________________    Aramis Urrutia NP    This is dictation was done by dragon, computer voice recognition software. Quite often unanticipated grammatical, syntax, homophones and other interpretive errors or inadvertently transcribed by the computer software. Please excuse errors that have escaped final proofreading. Thank you.

## 2021-12-23 NOTE — PROGRESS NOTES
Infectious Disease Progress Note           Subjective:   Stable, denies new complaints, no acute events since last seen, no fever/chills, no cough, remains on RA   Objective:   Physical Exam:     Visit Vitals  /70 (BP 1 Location: Right lower arm, BP Patient Position: Lying right side)   Pulse 88   Temp 98.3 °F (36.8 °C)   Resp 18   Ht 5' (1.524 m)   Wt 130 lb (59 kg)   SpO2 93%   BMI 25.39 kg/m²    O2 Flow Rate (L/min): 0 l/min O2 Device: None (Room air)    Temp (24hrs), Av.5 °F (36.9 °C), Min:98.3 °F (36.8 °C), Max:98.9 °F (37.2 °C)    No intake/output data recorded. No intake/output data recorded.     General: NAD, alert, non-verbal   HEENT: ALEJO, Moist mucosa, left sclera erythema   Lungs: CTA b/l, decreased at the bases, no wheeze/rhonchi   Heart: S1S2+, RRR, no murmur  Abdo: Soft, NT, ND, +BS   Exts: Contracted exts, some leg swelling   Skin: No wounds, No rashes or lesions    Data Review:       Recent Days:  Recent Labs     21  1047 21  0908   WBC 8.5 6.8   HGB 11.5* 11.2*   HCT 37.6 35.7*    312     Recent Labs     21  1047 21  0908   BUN 4* 5*   CREA 0.53* 0.62*       Lab Results   Component Value Date/Time    C-Reactive protein 6.44 (H) 2021 05:00 AM        Microbiology     Results     Procedure Component Value Units Date/Time    MRSA SCREEN - PCR (NASAL) [[id number]] Collected: 21 1100    Order Status: Canceled Specimen: Swab     CULTURE, BLOOD [[id number]] Collected: 21 1100    Order Status: Completed Specimen: Blood Updated: 21 0843     Special Requests: No Special Requests        Culture result: No growth 5 days       CULTURE, SPUTUM/BRONCH/OTH [[id number]] Collected: 21    Order Status: Canceled Specimen: Sputum     COVID-19 RAPID TEST [[id number]] Collected: 21 1620    Order Status: Completed Specimen: Nasopharyngeal Updated: 21 1740     Specimen source Nasopharyngeal        COVID-19 rapid test Not Detected        Comment: Rapid Abbott ID Now   Rapid NAAT:  The specimen is NEGATIVE for SARS-CoV-2, the novel coronavirus associated with COVID-19. Negative results should be treated as presumptive and, if inconsistent with clinical signs and symptoms or necessary for patient management, should be tested with an alternative molecular assay. Negative results do not preclude SARS-CoV-2 infection and should not be used as the sole basis for patient management decisions. This test has been authorized by the FDA under   an Emergency Use Authorization (EUA) for use by authorized laboratories. Fact sheet for Healthcare Providers: ConventionUpdate.co.nz Fact sheet for Patients: ConventionUpdate.co.nz   Methodology: Isothermal Nucleic Acid Amplification             Diagnostics   CXR Results  (Last 48 hours)    None             Assessment/Plan     1. Sepsis on admission due to suspected  aspiration PNA, COVID Neg       Extensive b/l reticulonodular infiltrates throughout both lungs similar on CT chest       Remains on RA, no cough, high risk for aspiration       WBC normalized on serial labs, afebrile       On day # 6 of empiric Zosyn. Routine labs in the morning      2. Dysphagia/poor oral intake: Oral intake remains poor, NGT to be placed       Mother is in agreement w peg tube placement       3. H/o seizure d/o, Continue anti-epileptics, being followed by neurology     4. Intellectual disability, quadriplegic w contracted ext    5. Acute left leg DVT, Eliquis to be held for peg tube placement     6.  Left sclera erythema: Continue symptomatic mgt     Cynthia Solis MD    12/23/2021

## 2021-12-23 NOTE — PROGRESS NOTES
Problem: Pressure Injury - Risk of  Goal: *Prevention of pressure injury  Description: Document Martin Scale and appropriate interventions in the flowsheet. Outcome: Progressing Towards Goal  Note: Pressure Injury Interventions:  Sensory Interventions: Assess changes in LOC    Moisture Interventions: Absorbent underpads    Activity Interventions: PT/OT evaluation    Mobility Interventions: PT/OT evaluation    Nutrition Interventions: Offer support with meals,snacks and hydration    Friction and Shear Interventions: HOB 30 degrees or less                Problem: Patient Education: Go to Patient Education Activity  Goal: Patient/Family Education  Outcome: Progressing Towards Goal     Problem: Patient Education: Go to Patient Education Activity  Goal: Patient/Family Education  Outcome: Progressing Towards Goal     Problem: Falls - Risk of  Goal: *Absence of Falls  Description: Document Jossy Fall Risk and appropriate interventions in the flowsheet. Outcome: Progressing Towards Goal  Note: Fall Risk Interventions:       Mentation Interventions: Evaluate medications/consider consulting pharmacy    Medication Interventions: Evaluate medications/consider consulting pharmacy    Elimination Interventions:  Toileting schedule/hourly rounds              Problem: Patient Education: Go to Patient Education Activity  Goal: Patient/Family Education  Outcome: Progressing Towards Goal

## 2021-12-23 NOTE — PROGRESS NOTES
Nutrition Note    RD reconsulted for TF recs. Complete assessment yesterday (12/22). TF ALREADY ORDERED. Initiate TF of Jevity 1.5 at 30mL/hr     Increase after 6 hours to goal rate 45mL/hr, continuous  Flush with 120mL free water q4hr  Goal feeds provide 1620kcal, 70g protein, 1541mL fluid (>/= 100% est needs)    Please provide TF per RD recs and order yesterday.     Electronically signed by Ara Rod on 12/23/2021 at 3:00 PM    Contact:

## 2021-12-23 NOTE — PROGRESS NOTES
NG tube 14 FR placed by [facility] charge, placement verified x2 nurses, Stat Chest Xray ordered for placement. Notified NP Noris Rosas of Chest xray results, okay to begin tube feeding.   Notified NP Noris Rosas  of speech recommendations, NP wants to continue current diet as pleasure feedings and begin tube feedings

## 2021-12-23 NOTE — PROGRESS NOTES
Attempted to see pt for follow-up. Pt's mother Josue Altman at bedside, nsg about to place NG tube for nutrition due to poor intake. PEG placement is planned. Pt turns away from trials of mildly thick from straw/spoon, does not participate in tx at this time. Discussed with Josue Altman re: role of SLP, recs to d/c PO diet when NG tube is in place, plan for SLP follow-up for PO diet recs after PEG Placement, will request MBS if/as indicated pending pt's participation and progress. Nsg made aware of recs. Pt's mother has requested SLP contact group home re: recs prior to d/c.

## 2021-12-24 LAB
AMMONIA PLAS-SCNC: 24 UMOL/L
ANION GAP SERPL CALC-SCNC: 7 MMOL/L (ref 5–15)
ANION GAP SERPL CALC-SCNC: 8 MMOL/L (ref 5–15)
APTT PPP: 56.2 SEC (ref 21.2–34.1)
APTT PPP: 65.8 SEC (ref 21.2–34.1)
APTT PPP: 75.7 SEC (ref 21.2–34.1)
BACTERIA SPEC CULT: NORMAL
BASOPHILS # BLD: 0 K/UL (ref 0–0.1)
BASOPHILS NFR BLD: 1 % (ref 0–1)
BUN SERPL-MCNC: 3 MG/DL (ref 6–20)
BUN SERPL-MCNC: 3 MG/DL (ref 6–20)
BUN/CREAT SERPL: 5 (ref 12–20)
BUN/CREAT SERPL: 5 (ref 12–20)
CA-I BLD-MCNC: 8.5 MG/DL (ref 8.5–10.1)
CA-I BLD-MCNC: 8.7 MG/DL (ref 8.5–10.1)
CHLORIDE SERPL-SCNC: 103 MMOL/L (ref 97–108)
CHLORIDE SERPL-SCNC: 104 MMOL/L (ref 97–108)
CO2 SERPL-SCNC: 26 MMOL/L (ref 21–32)
CO2 SERPL-SCNC: 26 MMOL/L (ref 21–32)
CREAT SERPL-MCNC: 0.6 MG/DL (ref 0.7–1.3)
CREAT SERPL-MCNC: 0.61 MG/DL (ref 0.7–1.3)
DIFFERENTIAL METHOD BLD: ABNORMAL
EOSINOPHIL # BLD: 0.3 K/UL (ref 0–0.4)
EOSINOPHIL NFR BLD: 3 % (ref 0–7)
ERYTHROCYTE [DISTWIDTH] IN BLOOD BY AUTOMATED COUNT: 13.2 % (ref 11.5–14.5)
GLUCOSE BLD STRIP.AUTO-MCNC: 110 MG/DL (ref 65–117)
GLUCOSE BLD STRIP.AUTO-MCNC: 117 MG/DL (ref 65–117)
GLUCOSE BLD STRIP.AUTO-MCNC: 130 MG/DL (ref 65–117)
GLUCOSE SERPL-MCNC: 116 MG/DL (ref 65–100)
GLUCOSE SERPL-MCNC: 119 MG/DL (ref 65–100)
HCT VFR BLD AUTO: 36.3 % (ref 36.6–50.3)
HGB BLD-MCNC: 11.7 G/DL (ref 12.1–17)
IMM GRANULOCYTES # BLD AUTO: 0 K/UL (ref 0–0.04)
IMM GRANULOCYTES NFR BLD AUTO: 0 % (ref 0–0.5)
LYMPHOCYTES # BLD: 1.4 K/UL (ref 0.8–3.5)
LYMPHOCYTES NFR BLD: 16 % (ref 12–49)
MCH RBC QN AUTO: 30.3 PG (ref 26–34)
MCHC RBC AUTO-ENTMCNC: 32.2 G/DL (ref 30–36.5)
MCV RBC AUTO: 94 FL (ref 80–99)
MONOCYTES # BLD: 0.6 K/UL (ref 0–1)
MONOCYTES NFR BLD: 7 % (ref 5–13)
NEUTS SEG # BLD: 6.5 K/UL (ref 1.8–8)
NEUTS SEG NFR BLD: 73 % (ref 32–75)
NRBC # BLD: 0 K/UL (ref 0–0.01)
NRBC BLD-RTO: 0 PER 100 WBC
PERFORMED BY, TECHID: ABNORMAL
PERFORMED BY, TECHID: NORMAL
PERFORMED BY, TECHID: NORMAL
PLATELET # BLD AUTO: 365 K/UL (ref 150–400)
PMV BLD AUTO: 9.6 FL (ref 8.9–12.9)
POTASSIUM SERPL-SCNC: 3.2 MMOL/L (ref 3.5–5.1)
POTASSIUM SERPL-SCNC: 3.3 MMOL/L (ref 3.5–5.1)
RBC # BLD AUTO: 3.86 M/UL (ref 4.1–5.7)
SODIUM SERPL-SCNC: 137 MMOL/L (ref 136–145)
SODIUM SERPL-SCNC: 137 MMOL/L (ref 136–145)
SPECIAL REQUESTS,SREQ: NORMAL
THERAPEUTIC RANGE,PTTT: ABNORMAL SEC (ref 82–109)
WBC # BLD AUTO: 8.9 K/UL (ref 4.1–11.1)

## 2021-12-24 PROCEDURE — 85730 THROMBOPLASTIN TIME PARTIAL: CPT

## 2021-12-24 PROCEDURE — 82962 GLUCOSE BLOOD TEST: CPT

## 2021-12-24 PROCEDURE — 74011250637 HC RX REV CODE- 250/637: Performed by: NURSE PRACTITIONER

## 2021-12-24 PROCEDURE — 74011250637 HC RX REV CODE- 250/637: Performed by: HOSPITALIST

## 2021-12-24 PROCEDURE — 80048 BASIC METABOLIC PNL TOTAL CA: CPT

## 2021-12-24 PROCEDURE — 74011000258 HC RX REV CODE- 258: Performed by: HOSPITALIST

## 2021-12-24 PROCEDURE — 74011250636 HC RX REV CODE- 250/636: Performed by: HOSPITALIST

## 2021-12-24 PROCEDURE — 36415 COLL VENOUS BLD VENIPUNCTURE: CPT

## 2021-12-24 PROCEDURE — 82140 ASSAY OF AMMONIA: CPT

## 2021-12-24 PROCEDURE — 74011250636 HC RX REV CODE- 250/636: Performed by: NURSE PRACTITIONER

## 2021-12-24 PROCEDURE — 65270000029 HC RM PRIVATE

## 2021-12-24 PROCEDURE — 85025 COMPLETE CBC W/AUTO DIFF WBC: CPT

## 2021-12-24 PROCEDURE — 99232 SBSQ HOSP IP/OBS MODERATE 35: CPT | Performed by: INTERNAL MEDICINE

## 2021-12-24 RX ORDER — POTASSIUM CHLORIDE 1.5 G/1.77G
40 POWDER, FOR SOLUTION ORAL
Status: COMPLETED | OUTPATIENT
Start: 2021-12-24 | End: 2021-12-24

## 2021-12-24 RX ORDER — MIDODRINE HYDROCHLORIDE 5 MG/1
5 TABLET ORAL
Status: DISCONTINUED | OUTPATIENT
Start: 2021-12-24 | End: 2022-01-09

## 2021-12-24 RX ADMIN — CETIRIZINE HYDROCHLORIDE 10 MG: 10 TABLET, FILM COATED ORAL at 10:41

## 2021-12-24 RX ADMIN — KETOTIFEN FUMARATE 1 DROP: 0.35 SOLUTION/ DROPS OPHTHALMIC at 20:03

## 2021-12-24 RX ADMIN — PIPERACILLIN AND TAZOBACTAM 3.38 G: 3; .375 INJECTION, POWDER, LYOPHILIZED, FOR SOLUTION INTRAVENOUS at 12:53

## 2021-12-24 RX ADMIN — LAMOTRIGINE 300 MG: 100 TABLET ORAL at 10:41

## 2021-12-24 RX ADMIN — MIDODRINE HYDROCHLORIDE 5 MG: 5 TABLET ORAL at 17:03

## 2021-12-24 RX ADMIN — LACTULOSE 15 ML: 10 SOLUTION ORAL at 10:41

## 2021-12-24 RX ADMIN — SODIUM CHLORIDE 75 ML/HR: 9 INJECTION, SOLUTION INTRAVENOUS at 21:58

## 2021-12-24 RX ADMIN — MIDODRINE HYDROCHLORIDE 5 MG: 5 TABLET ORAL at 10:41

## 2021-12-24 RX ADMIN — PREDNISOLONE ACETATE 1 DROP: 10 SUSPENSION/ DROPS OPHTHALMIC at 09:00

## 2021-12-24 RX ADMIN — HEPARIN SODIUM AND DEXTROSE 20 UNITS/KG/HR: 10000; 5 INJECTION INTRAVENOUS at 18:31

## 2021-12-24 RX ADMIN — KETOTIFEN FUMARATE 1 DROP: 0.35 SOLUTION/ DROPS OPHTHALMIC at 09:00

## 2021-12-24 RX ADMIN — PHENYTOIN 100 MG: 125 SUSPENSION ORAL at 10:41

## 2021-12-24 RX ADMIN — LEVETIRACETAM 1000 MG: 10 INJECTION, SOLUTION INTRAVENOUS at 10:38

## 2021-12-24 RX ADMIN — PREDNISOLONE ACETATE 1 DROP: 10 SUSPENSION/ DROPS OPHTHALMIC at 20:03

## 2021-12-24 RX ADMIN — HEPARIN SODIUM 1770 UNITS: 1000 INJECTION INTRAVENOUS; SUBCUTANEOUS at 11:58

## 2021-12-24 RX ADMIN — POTASSIUM CHLORIDE 40 MEQ: 1.5 POWDER, FOR SOLUTION ORAL at 17:03

## 2021-12-24 RX ADMIN — PSYLLIUM HUSK 1 PACKET: 3.4 POWDER ORAL at 10:41

## 2021-12-24 RX ADMIN — LAMOTRIGINE 300 MG: 100 TABLET ORAL at 20:08

## 2021-12-24 RX ADMIN — PIPERACILLIN AND TAZOBACTAM 3.38 G: 3; .375 INJECTION, POWDER, LYOPHILIZED, FOR SOLUTION INTRAVENOUS at 22:01

## 2021-12-24 RX ADMIN — PIPERACILLIN AND TAZOBACTAM 3.38 G: 3; .375 INJECTION, POWDER, LYOPHILIZED, FOR SOLUTION INTRAVENOUS at 07:20

## 2021-12-24 RX ADMIN — Medication 10 ML: at 14:37

## 2021-12-24 RX ADMIN — FOLIC ACID 1 MG: 1 TABLET ORAL at 10:41

## 2021-12-24 RX ADMIN — LEVETIRACETAM 1000 MG: 10 INJECTION, SOLUTION INTRAVENOUS at 20:02

## 2021-12-24 RX ADMIN — HEPARIN SODIUM 1770 UNITS: 1000 INJECTION INTRAVENOUS; SUBCUTANEOUS at 02:32

## 2021-12-24 RX ADMIN — Medication 10 ML: at 07:21

## 2021-12-24 RX ADMIN — PHENYTOIN 130 MG: 125 SUSPENSION ORAL at 20:07

## 2021-12-24 RX ADMIN — HEPARIN SODIUM 1770 UNITS: 1000 INJECTION INTRAVENOUS; SUBCUTANEOUS at 22:08

## 2021-12-24 NOTE — PROGRESS NOTES
Hospitalist Progress Note    Subjective:   Daily Progress Note: 12/24/2021 4:21 PM    Hospital Course: The patient is a 45-year-old male with a PMH significant for intellectual disabilities, seizure disorder, quadriplegia. Lives in a group home. Caregivers state he had not been eating or drinking for 4 days or having bowel movements. Unsuccessful enema. Then he started vomiting several times. Brought to the emergency room he was hypertensive, tachycardic and altered from baseline. Chest x-ray consistent with aspiration pneumonia. He presents tachycardic and febrile with hypertension. Admitted for further management of sepsis, aspiration pneumonia, respiratory failure with hypoxia, breakthrough seizure activity. Blood pressure decreasing required ICU admission and septic shock and started on Levophed. Started on vancomycin and Zosyn for aspiration pneumonia. Required nasal cannula oxygen for acute hypoxic respiratory failure. Weaned off pressor therapy and transferred to medical floor for further management. Left leg Doppler study positive for left external iliac vein thrombosis, left common femoral vein thrombosis and left proximal femoral vein thrombosis. He was started on Eliquis. Decreased antibiotic to monotherapy IV Zosyn. Started on Ketotifen and prednisone eyedrop for left sclera edema. Due to high risk of aspiration secondary to chronic disabilities and anatomical swallowing weakness, inability to maintain hydration and nutrition status he would benefit from feeding tube placement. GI has been consulted, plan for PEG tube placement Monday. Heparin drip for bridge therapy while holding Eliquis. NG tube placement for feeding tubes until PEG tube can be placed. Subjective: Follow up patient at the bedside. Unable to answer questions due to chronic baseline intellectual deficits. Staff report isolated breakthrough seizure of limited length resolving without intervention.     Current Facility-Administered Medications   Medication Dose Route Frequency    midodrine (PROAMATINE) tablet 5 mg  5 mg Oral TID WITH MEALS    potassium chloride (KLOR-CON) packet for solution 40 mEq  40 mEq Per NG tube NOW    prednisoLONE acetate (PRED FORTE) 1 % ophthalmic suspension 1 Drop  1 Drop Left Eye BID    heparin 25,000 units in D5W 250 ml infusion  12-25 Units/kg/hr IntraVENous TITRATE    heparin (porcine) 1,000 unit/mL injection 3,540 Units  60 Units/kg IntraVENous PRN    Or    heparin (porcine) 1,000 unit/mL injection 1,770 Units  30 Units/kg IntraVENous PRN    [Held by provider] apixaban (ELIQUIS) tablet 5 mg  5 mg Oral BID    0.9% sodium chloride infusion  75 mL/hr IntraVENous CONTINUOUS    levETIRAcetam in saline (iso-os) (KEPPRA) infusion 1,000 mg  1,000 mg IntraVENous BID    acetaminophen (TYLENOL) suppository 650 mg  650 mg Rectal Q4H PRN    ondansetron (ZOFRAN) injection 4 mg  4 mg IntraVENous Q6H PRN    phenytoin (DILANTIN) 100 mg/4 mL oral suspension 100 mg  100 mg Oral DAILY    phenytoin (DILANTIN) 100 mg/4 mL oral suspension 130 mg  130 mg Oral QHS    0.9% sodium chloride infusion 1,000 mL  1,000 mL IntraVENous CONTINUOUS    0.9% sodium chloride infusion 1,000 mL  1,000 mL IntraVENous CONTINUOUS    cetirizine (ZYRTEC) tablet 10 mg  10 mg Oral DAILY    folic acid (FOLVITE) tablet 1 mg  1 mg Oral DAILY    ketotifen (ZADITOR) 0.025 % (0.035 %) ophthalmic solution 1 Drop  1 Drop Both Eyes BID    lactulose (CHRONULAC) 10 gram/15 mL solution 15 mL  10 g Oral DAILY    lamoTRIgine (LaMICtal) tablet 300 mg  300 mg Oral BID    psyllium husk-aspartame (METAMUCIL FIBER) packet 1 Packet  1 Packet Oral DAILY    sodium chloride (NS) flush 5-40 mL  5-40 mL IntraVENous Q8H    sodium chloride (NS) flush 5-40 mL  5-40 mL IntraVENous PRN    acetaminophen (TYLENOL) tablet 650 mg  650 mg Oral Q4H PRN    albuterol (PROVENTIL HFA, VENTOLIN HFA, PROAIR HFA) inhaler 2 Puff  2 Puff Inhalation Q4H PRN  piperacillin-tazobactam (ZOSYN) 3.375 g in 0.9% sodium chloride (MBP/ADV) 100 mL MBP  3.375 g IntraVENous Q8H        REVIEW OF SYSTEMS    Review of Systems   Unable to perform ROS: Mental acuity        Objective:     Visit Vitals  BP (!) 98/59 (BP 1 Location: Right lower arm, BP Patient Position: At rest;Lying right side)   Pulse 97   Temp 98 °F (36.7 °C)   Resp 18   Ht 5' (1.524 m)   Wt 59 kg (130 lb)   SpO2 92%   BMI 25.39 kg/m²    O2 Flow Rate (L/min): 0 l/min O2 Device: None (Room air)    Temp (24hrs), Av.7 °F (37.1 °C), Min:98 °F (36.7 °C), Max:99.6 °F (37.6 °C)       07 -  1900  In: 120   Out: -   No intake/output data recorded. PHYSICAL EXAM:    Physical Exam  Constitutional:       Appearance: He is ill-appearing. HENT:      Nose: Congestion present. Cardiovascular:      Rate and Rhythm: Regular rhythm. Tachycardia present. Pulmonary:      Effort: No respiratory distress. Breath sounds: Rhonchi present. Abdominal:      General: Bowel sounds are normal.   Musculoskeletal:         General: Deformity present. Comments: Contracted, quadriplegic, bedbound   Skin:     Coloration: Skin is pale. Neurological:      Mental Status: Mental status is at baseline.       Comments: Aphasic with intellectual disabilities          Data Review    Recent Results (from the past 24 hour(s))   GLUCOSE, POC    Collection Time: 21  3:48 PM   Result Value Ref Range    Glucose (POC) 77 65 - 117 mg/dL    Performed by Lynnwood Babinski LISA    PTT    Collection Time: 21  3:58 PM   Result Value Ref Range    aPTT 28.9 21.2 - 34.1 sec    aPTT, therapeutic range   82 - 109 sec   CBC WITH AUTOMATED DIFF    Collection Time: 21  4:07 PM   Result Value Ref Range    WBC 8.2 4.1 - 11.1 K/uL    RBC 3.77 (L) 4.10 - 5.70 M/uL    HGB 11.4 (L) 12.1 - 17.0 g/dL    HCT 36.1 (L) 36.6 - 50.3 %    MCV 95.8 80.0 - 99.0 FL    MCH 30.2 26.0 - 34.0 PG    MCHC 31.6 30.0 - 36.5 g/dL    RDW 13.2 11.5 - 14.5 % PLATELET 467 453 - 775 K/uL    MPV 10.2 8.9 - 12.9 FL    NRBC 0.0 0.0  WBC    ABSOLUTE NRBC 0.00 0.00 - 0.01 K/uL    NEUTROPHILS 70 32 - 75 %    LYMPHOCYTES 17 12 - 49 %    MONOCYTES 7 5 - 13 %    EOSINOPHILS 5 0 - 7 %    BASOPHILS 1 0 - 1 %    IMMATURE GRANULOCYTES 0 0 - 0.5 %    ABS. NEUTROPHILS 5.8 1.8 - 8.0 K/UL    ABS. LYMPHOCYTES 1.4 0.8 - 3.5 K/UL    ABS. MONOCYTES 0.6 0.0 - 1.0 K/UL    ABS. EOSINOPHILS 0.4 0.0 - 0.4 K/UL    ABS. BASOPHILS 0.1 0.0 - 0.1 K/UL    ABS. IMM.  GRANS. 0.0 0.00 - 0.04 K/UL    DF AUTOMATED     METABOLIC PANEL, BASIC    Collection Time: 12/24/21  1:36 AM   Result Value Ref Range    Sodium 137 136 - 145 mmol/L    Potassium 3.2 (L) 3.5 - 5.1 mmol/L    Chloride 104 97 - 108 mmol/L    CO2 26 21 - 32 mmol/L    Anion gap 7 5 - 15 mmol/L    Glucose 119 (H) 65 - 100 mg/dL    BUN 3 (L) 6 - 20 mg/dL    Creatinine 0.60 (L) 0.70 - 1.30 mg/dL    BUN/Creatinine ratio 5 (L) 12 - 20      GFR est AA >60 >60 ml/min/1.73m2    GFR est non-AA >60 >60 ml/min/1.73m2    Calcium 8.5 8.5 - 10.1 mg/dL   PTT    Collection Time: 12/24/21  1:36 AM   Result Value Ref Range    aPTT 56.2 (H) 21.2 - 34.1 sec    aPTT, therapeutic range   82 - 109 sec   AMMONIA    Collection Time: 12/24/21  6:54 AM   Result Value Ref Range    Ammonia 24 <32 umol/L   GLUCOSE, POC    Collection Time: 12/24/21  7:46 AM   Result Value Ref Range    Glucose (POC) 110 65 - 117 mg/dL    Performed by Radha Cormier    PTT    Collection Time: 12/24/21 10:36 AM   Result Value Ref Range    aPTT 65.8 (H) 21.2 - 34.1 sec    aPTT, therapeutic range   82 - 109 sec   CBC WITH AUTOMATED DIFF    Collection Time: 12/24/21 10:36 AM   Result Value Ref Range    WBC 8.9 4.1 - 11.1 K/uL    RBC 3.86 (L) 4.10 - 5.70 M/uL    HGB 11.7 (L) 12.1 - 17.0 g/dL    HCT 36.3 (L) 36.6 - 50.3 %    MCV 94.0 80.0 - 99.0 FL    MCH 30.3 26.0 - 34.0 PG    MCHC 32.2 30.0 - 36.5 g/dL    RDW 13.2 11.5 - 14.5 %    PLATELET 571 985 - 806 K/uL    MPV 9.6 8.9 - 12.9 FL    NRBC 0.0 0.0  WBC    ABSOLUTE NRBC 0.00 0.00 - 0.01 K/uL    NEUTROPHILS 73 32 - 75 %    LYMPHOCYTES 16 12 - 49 %    MONOCYTES 7 5 - 13 %    EOSINOPHILS 3 0 - 7 %    BASOPHILS 1 0 - 1 %    IMMATURE GRANULOCYTES 0 0 - 0.5 %    ABS. NEUTROPHILS 6.5 1.8 - 8.0 K/UL    ABS. LYMPHOCYTES 1.4 0.8 - 3.5 K/UL    ABS. MONOCYTES 0.6 0.0 - 1.0 K/UL    ABS. EOSINOPHILS 0.3 0.0 - 0.4 K/UL    ABS. BASOPHILS 0.0 0.0 - 0.1 K/UL    ABS. IMM. GRANS. 0.0 0.00 - 0.04 K/UL    DF AUTOMATED     METABOLIC PANEL, BASIC    Collection Time: 12/24/21 10:36 AM   Result Value Ref Range    Sodium 137 136 - 145 mmol/L    Potassium 3.3 (L) 3.5 - 5.1 mmol/L    Chloride 103 97 - 108 mmol/L    CO2 26 21 - 32 mmol/L    Anion gap 8 5 - 15 mmol/L    Glucose 116 (H) 65 - 100 mg/dL    BUN 3 (L) 6 - 20 mg/dL    Creatinine 0.61 (L) 0.70 - 1.30 mg/dL    BUN/Creatinine ratio 5 (L) 12 - 20      GFR est AA >60 >60 ml/min/1.73m2    GFR est non-AA >60 >60 ml/min/1.73m2    Calcium 8.7 8.5 - 10.1 mg/dL   GLUCOSE, POC    Collection Time: 12/24/21 11:34 AM   Result Value Ref Range    Glucose (POC) 117 65 - 117 mg/dL    Performed by MIGUEL ÁNGEL CAMACHO        XR CHEST PORT   Final Result   1. Nasogastric tube tip overlies the right upper quadrant, likely the gastric   antrum or duodenal bulb. 2. Persistent reticulonodular opacities in both lungs. CTA CHEST W OR W WO CONT   Final Result   1. Extensive bilateral reticulonodular infiltrates throughout both lungs similar   to the previous study although both lungs were not included in their entirety on   the previous study. Findings may represent infectious, inflammatory or   neoplastic process. Recommend follow-up to. No evidence of pulmonary embolus,   aortic aneurysm or aortic dissection. 3. Borderline enlarged gastrohepatic lymph node. 4. Thickened esophagus, recommend clinical evaluation. DUPLEX LOWER EXT VENOUS BILAT   Final Result      XR CHEST PORT   Final Result   No significant interval change.       CT ABD PELV W CONT   Final Result      1. Small focus of intraluminal gas within the urinary bladder. Please correlate   for recent instrumentation or other causes of intraluminal gas, including an   infectious process. 2. Somewhat reticular nodular opacities in the lung bases, suboptimally   evaluated due to respiratory motion. Some of these have what appears to be a   tree and bud morphology, suggesting either chronic aspiration or an infectious   or inflammatory small airways disease. Otherwise a fibrotic process should be   considered. This would be better characterized with dedicated chest CT as   clinically warranted. 3. Underdistention versus circumferential wall thickening of the distal   esophagus. 4. Probable left trochanteric bursitis with sterility of the fluid   indeterminate. 5. Probable myositis ossificans surrounding the left hip. Please correlate for   history of prior trauma. 6. Enlarged lymph nodes in the upper abdomen, nonspecific. Comparison with   outside imaging would be helpful, if available, to determine longer term   stability. Otherwise short interval follow-up in 3 months is recommended. XR CHEST SNGL V   Final Result   Patchy opacities within the lungs, nonspecific, but leading differential   considerations include infection or pulmonary edema. Superimposed acute disease   on an underlying chronic interstitial process is also possible. Radiographic   follow-up is recommended to exclude other causes of opacification. XR ABD (KUB)   Final Result   Mildly prominent gas-filled loops of bowel within the central abdomen with   overall nonobstructive bowel gas pattern.           Active Problems:    Sepsis ([address] (12/17/2021)      Aspiration pneumonia ([address] (12/17/2021)      Respiratory failure with hypoxia ([address] (12/21/2021)      DVT (deep venous thrombosis) ([address] (12/23/2021)        Assessment/Plan:       Respiratory failure with hypoxia  Aspiration pneumonia  Continue oxygen as needed to maintain saturations greater than 92%: Currently on 3 L  Continues on IV Zosyn day # 7/10  ID following  Speech evaluation pending recommending PEG placement  GI consulted for feeding tube placement due to high risk aspiration and inability to meet nutrition and hydration needs scheduled for Monday  NG tube placement to start feeding until PEG tube is placed    Septic shockresolved  Off pressors    Seizure disorder  Continue home medication at same dose most likely subtherapeutic levels caused from med med interaction  Continue Keppra infusion 1000 mg twice daily  Dilantin 100 mg daily, 130 mg at bedtime  Neurology consulted    Intellectual disabilities  Quadriplegia  Plan return to group facility when stable    DVTs  Doppler study positive for left external iliac vein, left common femoral vein and left proximal femoral vein thrombosis  Per oncology hematology consult will hold Eliquis for pending PEG tube placement and start heparin bridge therapy. Eliquis can be restarted 12 hours after PEG tube placement  Heparin low-dose drip    Hypotension  Started on midodrine twice daily 5 mg    Hypokalemia  Replete and monitor    DVT Prophylaxis: Eliquis  Code Status: Full Code  POA/NOK:    Disposition and discharge barriers:   · Continue treatment for aspiration pneumonia including IV antibiotics and oxygen  · GI and nutrition consulted for feeding tube placement scheduled for Monday  · Plan return to group home when stable  Care Plan discussed with: Mother, RN and IDR team  _____________________________________________________________________________  Time spent in direct care including coordination of service, review of data and examination: > 35 minutes    ______________________________________________________________________________    Dana Genao NP    This is dictation was done by dragon, computer voice recognition software.   Quite often unanticipated grammatical, syntax, homophones and other interpretive errors or inadvertently transcribed by the computer software. Please excuse errors that have escaped final proofreading. Thank you.

## 2021-12-24 NOTE — PROGRESS NOTES
Progress Note    Patient: Marcin Ferguson MRN: [id number]  SSN: [id number]    YOB: 1975  Age: 55 y.o. Sex: male      Admit Date: 12/17/2021    LOS: 7 days     Subjective:   GI in consultation for peg tube placement    Patient seen resting at this time. No signs of distress noted. NG tube in place nasogastric feeding infusing without difficulty. He is on heparin drip for bridging of Eliqus pending peg tube placement on Monday 12/27/2021. Hold heparin 4 hours prior to peg tube placement. Then eliquis may be restarted 12 hours after procedure. Hematology input appreciated. Objective:     Vitals:    12/23/21 1950 12/24/21 0152 12/24/21 1001 12/24/21 1034   BP: 108/77 98/63 129/83 96/60   Pulse: 96 91 76 97   Resp: 18 18 18 18   Temp: 98.8 °F (37.1 °C) 99.6 °F (37.6 °C) 98.5 °F (36.9 °C) 98 °F (36.7 °C)   SpO2: 94% 92% 93% 92%   Weight:       Height:            Intake and Output:  Current Shift: No intake/output data recorded. Last three shifts: No intake/output data recorded. Physical Exam:   Skin:  Extremities and face reveal no rashes. No mark erythema. HEENT: Sclerae anicteric. Extra-occular muscles are intact. No abnormal pigmentation of the lips. The neck is supple. Cardiovascular: Regular rate and rhythm. Respiratory:  Comfortable breathing with no accessory muscle use. GI:  Abdomen nondistended, soft, and nontender. No enlargement of the liver or spleen. No masses palpable.   Rectal:  Deferred  Musculoskeletal: bilateral upper extremity contractures, weak  Neurological:  Intellectual disability  Psychiatric:  Alert non-verbal  Lymphatic:  No visible adenopathy      Lab/Data Review:  Recent Results (from the past 24 hour(s))   GLUCOSE, POC    Collection Time: 12/23/21  3:48 PM   Result Value Ref Range    Glucose (POC) 77 65 - 117 mg/dL    Performed by Navya Mendez    PTT    Collection Time: 12/23/21  3:58 PM   Result Value Ref Range    aPTT 28.9 21.2 - 34.1 sec    aPTT, therapeutic range   82 - 109 sec   CBC WITH AUTOMATED DIFF    Collection Time: 12/23/21  4:07 PM   Result Value Ref Range    WBC 8.2 4.1 - 11.1 K/uL    RBC 3.77 (L) 4.10 - 5.70 M/uL    HGB 11.4 (L) 12.1 - 17.0 g/dL    HCT 36.1 (L) 36.6 - 50.3 %    MCV 95.8 80.0 - 99.0 FL    MCH 30.2 26.0 - 34.0 PG    MCHC 31.6 30.0 - 36.5 g/dL    RDW 13.2 11.5 - 14.5 %    PLATELET 579 050 - 707 K/uL    MPV 10.2 8.9 - 12.9 FL    NRBC 0.0 0.0  WBC    ABSOLUTE NRBC 0.00 0.00 - 0.01 K/uL    NEUTROPHILS 70 32 - 75 %    LYMPHOCYTES 17 12 - 49 %    MONOCYTES 7 5 - 13 %    EOSINOPHILS 5 0 - 7 %    BASOPHILS 1 0 - 1 %    IMMATURE GRANULOCYTES 0 0 - 0.5 %    ABS. NEUTROPHILS 5.8 1.8 - 8.0 K/UL    ABS. LYMPHOCYTES 1.4 0.8 - 3.5 K/UL    ABS. MONOCYTES 0.6 0.0 - 1.0 K/UL    ABS. EOSINOPHILS 0.4 0.0 - 0.4 K/UL    ABS. BASOPHILS 0.1 0.0 - 0.1 K/UL    ABS. IMM. GRANS. 0.0 0.00 - 0.04 K/UL    DF AUTOMATED     METABOLIC PANEL, BASIC    Collection Time: 12/24/21  1:36 AM   Result Value Ref Range    Sodium 137 136 - 145 mmol/L    Potassium 3.2 (L) 3.5 - 5.1 mmol/L    Chloride 104 97 - 108 mmol/L    CO2 26 21 - 32 mmol/L    Anion gap 7 5 - 15 mmol/L    Glucose 119 (H) 65 - 100 mg/dL    BUN 3 (L) 6 - 20 mg/dL    Creatinine 0.60 (L) 0.70 - 1.30 mg/dL    BUN/Creatinine ratio 5 (L) 12 - 20      GFR est AA >60 >60 ml/min/1.73m2    GFR est non-AA >60 >60 ml/min/1.73m2    Calcium 8.5 8.5 - 10.1 mg/dL   PTT    Collection Time: 12/24/21  1:36 AM   Result Value Ref Range    aPTT 56.2 (H) 21.2 - 34.1 sec    aPTT, therapeutic range   82 - 109 sec   AMMONIA    Collection Time: 12/24/21  6:54 AM   Result Value Ref Range    Ammonia 24 <32 umol/L   GLUCOSE, POC    Collection Time: 12/24/21  7:46 AM   Result Value Ref Range    Glucose (POC) 110 65 - 117 mg/dL    Performed by WOOD JANICE               XR CHEST PORT   Final Result   1. Nasogastric tube tip overlies the right upper quadrant, likely the gastric   antrum or duodenal bulb.    2. Persistent reticulonodular opacities in both lungs. CTA CHEST W OR W WO CONT   Final Result   1. Extensive bilateral reticulonodular infiltrates throughout both lungs similar   to the previous study although both lungs were not included in their entirety on   the previous study. Findings may represent infectious, inflammatory or   neoplastic process. Recommend follow-up to. No evidence of pulmonary embolus,   aortic aneurysm or aortic dissection. 3. Borderline enlarged gastrohepatic lymph node. 4. Thickened esophagus, recommend clinical evaluation. DUPLEX LOWER EXT VENOUS BILAT   Final Result      XR CHEST PORT   Final Result   No significant interval change. CT ABD PELV W CONT   Final Result      1. Small focus of intraluminal gas within the urinary bladder. Please correlate   for recent instrumentation or other causes of intraluminal gas, including an   infectious process. 2. Somewhat reticular nodular opacities in the lung bases, suboptimally   evaluated due to respiratory motion. Some of these have what appears to be a   tree and bud morphology, suggesting either chronic aspiration or an infectious   or inflammatory small airways disease. Otherwise a fibrotic process should be   considered. This would be better characterized with dedicated chest CT as   clinically warranted. 3. Underdistention versus circumferential wall thickening of the distal   esophagus. 4. Probable left trochanteric bursitis with sterility of the fluid   indeterminate. 5. Probable myositis ossificans surrounding the left hip. Please correlate for   history of prior trauma. 6. Enlarged lymph nodes in the upper abdomen, nonspecific. Comparison with   outside imaging would be helpful, if available, to determine longer term   stability. Otherwise short interval follow-up in 3 months is recommended.       XR CHEST SNGL V   Final Result   Patchy opacities within the lungs, nonspecific, but leading differential   considerations include infection or pulmonary edema. Superimposed acute disease   on an underlying chronic interstitial process is also possible. Radiographic   follow-up is recommended to exclude other causes of opacification. XR ABD (KUB)   Final Result   Mildly prominent gas-filled loops of bowel within the central abdomen with   overall nonobstructive bowel gas pattern. Assessment:     Active Problems:    Sepsis ([address] (12/17/2021)      Aspiration pneumonia ([address] (12/17/2021)      Respiratory failure with hypoxia ([address] (12/21/2021)      DVT (deep venous thrombosis) ([address] (12/23/2021)        Plan:   1. Dysphagia      EGD with peg tube placement Monday 12/27/21      NPO after midnight on 12/27/21      Mother in agreement for peg tube placement      NG tube for feeding      Feeding per nutritionist recommendation      Eliquis on hold bridging with heparin      Hold heparin 4 hours prior to peg tube placement      May restart Eliquis 12 hours post peg placement      Hematology input appreciated  2. Sepsis 2/2 aspiration pneumonia      Infectious Disease input appreciated      Continue IV zosyn      low grad temp of 99.6 this am       Currently on room air  3. Seizure disorder      Neurology input appreciated      Continue home medications  4. Acute Left leg DVT     Eliquis on hold bridging with heparin 2/2 to peg tube placement     May restart Eliquis 12 hours post peg placement per hematology recommendation. Hematology input appreciated    Thank you for allowing me to participate in this patients care. Plan discussed with Dr. Yamel Loaiza and he approves.     Signed By: Jessie Conroy NP     December 24, 2021

## 2021-12-24 NOTE — PROGRESS NOTES
Infectious Disease Progress Note           Subjective:   Stable, minimally responsive, no acute events since last seen. NGT in place for nutrition   Objective:   Physical Exam:     Visit Vitals  BP 96/60 (BP 1 Location: Right lower arm, BP Patient Position: At rest;Supine)   Pulse 97   Temp 98 °F (36.7 °C)   Resp 18   Ht 5' (1.524 m)   Wt 130 lb (59 kg)   SpO2 92%   BMI 25.39 kg/m²    O2 Flow Rate (L/min): 0 l/min O2 Device: None (Room air)    Temp (24hrs), Av.6 °F (37 °C), Min:98 °F (36.7 °C), Max:99.6 °F (37.6 °C)    No intake/output data recorded. No intake/output data recorded.     General: NAD, alert, non-verbal   HEENT: ALEJO, Moist mucosa, decreased left sclera erythema, NGT in place   Lungs: CTA b/l, decreased at the bases, no wheeze/rhonchi   Heart: S1S2+, RRR, no murmur  Abdo: Soft, NT, ND, +BS   Exts: Contracted exts, some leg swelling   Skin: No wounds, No rashes or lesions    Data Review:       Recent Days:  Recent Labs     21  1607 21  1047 21  0908   WBC 8.2 8.5 6.8   HGB 11.4* 11.5* 11.2*   HCT 36.1* 37.6 35.7*    332 312     Recent Labs     21  0136 21  1047 21  0908   BUN 3* 4* 5*   CREA 0.60* 0.53* 0.62*       Lab Results   Component Value Date/Time    C-Reactive protein 6.44 (H) 2021 05:00 AM        Microbiology     Results     Procedure Component Value Units Date/Time    MRSA SCREEN - PCR (NASAL) [[id number]] Collected: 21 1100    Order Status: Canceled Specimen: Swab     CULTURE, BLOOD [[id number]] Collected: 21 1100    Order Status: Completed Specimen: Blood Updated: 21 1039     Special Requests: No Special Requests        Culture result: No growth 6 days       CULTURE, SPUTUM/BRONCH/OTH [[id number]] Collected: 21    Order Status: Canceled Specimen: Sputum     COVID-19 RAPID TEST [[id number]] Collected: 21 1620    Order Status: Completed Specimen: Nasopharyngeal Updated: 21 2715 Specimen source Nasopharyngeal        COVID-19 rapid test Not Detected        Comment: Rapid [employer] ID Now   Rapid NAAT:  The specimen is NEGATIVE for SARS-CoV-2, the novel coronavirus associated with COVID-19. Negative results should be treated as presumptive and, if inconsistent with clinical signs and symptoms or necessary for patient management, should be tested with an alternative molecular assay. Negative results do not preclude SARS-CoV-2 infection and should not be used as the sole basis for patient management decisions. This test has been authorized by the FDA under   an Emergency Use Authorization (EUA) for use by authorized laboratories. Fact sheet for Healthcare Providers: [employer] Fact sheet for Patients: [employer]   Methodology: Isothermal Nucleic Acid Amplification             Diagnostics   CXR Results  (Last 48 hours)               12/23/21 1500  XR CHEST PORT Final result    Impression:  1. Nasogastric tube tip overlies the right upper quadrant, likely the gastric   antrum or duodenal bulb. 2. Persistent reticulonodular opacities in both lungs. Narrative:  EXAMINATION: XR CHEST PORT       HISTORY: Nasogastric tube placement       COMPARISON: Chest radiograph dated 12/20/2021 and chest CT dated 12/21/2020       FINDINGS: Single view. Nasogastric tube tip overlies the right upper quadrant. There are persistent reticulonodular opacities in both lungs. The chin obscures   the right lung apex. There is no pneumothorax. No definite pleural effusion. Heart size is unchanged. Basal nerves stimulator device overlies the left   hemithorax. Assessment/Plan     1. Sepsis on admission due to suspected  aspiration PNA, COVID Neg       Extensive b/l reticulonodular infiltrates throughout both lungs similar on CT chest       Maintaining O2 sats on RA, very weak cough        On day # 7/10 of empiric Zosyn.  Routine labs in the morning      2. Dysphagia/poor oral intake: Currently has an NGT w plans for peg tube placement       3. H/o seizure d/o, Controlled on anti-epileptics    4. Acute left leg DVT, Eliquis to be held for peg tube placement     5.  Left sclera erythema: Continue symptomatic mgt     Leah Mcrae MD    12/24/2021

## 2021-12-24 NOTE — PROGRESS NOTES
Problem: Pressure Injury - Risk of  Goal: *Prevention of pressure injury  Description: Document Martin Scale and appropriate interventions in the flowsheet. Outcome: Progressing Towards Goal  Note: Pressure Injury Interventions:  Sensory Interventions: Assess changes in LOC    Moisture Interventions: Absorbent underpads    Activity Interventions: PT/OT evaluation    Mobility Interventions: PT/OT evaluation    Nutrition Interventions: Document food/fluid/supplement intake    Friction and Shear Interventions: HOB 30 degrees or less                Problem: Patient Education: Go to Patient Education Activity  Goal: Patient/Family Education  Outcome: Progressing Towards Goal     Problem: Patient Education: Go to Patient Education Activity  Goal: Patient/Family Education  Outcome: Progressing Towards Goal     Problem: Falls - Risk of  Goal: *Absence of Falls  Description: Document Jossy Fall Risk and appropriate interventions in the flowsheet. Outcome: Progressing Towards Goal  Note: Fall Risk Interventions:       Mentation Interventions: Adequate sleep, hydration, pain control    Medication Interventions: Bed/chair exit alarm    Elimination Interventions:  Toileting schedule/hourly rounds              Problem: Patient Education: Go to Patient Education Activity  Goal: Patient/Family Education  Outcome: Progressing Towards Goal

## 2021-12-24 NOTE — PROGRESS NOTES
At  0900 notified lab of stat PTT ordered for 0830 talked with Hansel Montgomery, stated it would be drawn

## 2021-12-25 LAB
ANION GAP SERPL CALC-SCNC: 7 MMOL/L (ref 5–15)
APTT PPP: 110 SEC (ref 21.2–34.1)
APTT PPP: 65.1 SEC (ref 21.2–34.1)
BASOPHILS # BLD: 0.1 K/UL (ref 0–0.1)
BASOPHILS NFR BLD: 1 % (ref 0–1)
BUN SERPL-MCNC: 3 MG/DL (ref 6–20)
BUN/CREAT SERPL: 4 (ref 12–20)
CA-I BLD-MCNC: 8.8 MG/DL (ref 8.5–10.1)
CHLORIDE SERPL-SCNC: 106 MMOL/L (ref 97–108)
CO2 SERPL-SCNC: 27 MMOL/L (ref 21–32)
CREAT SERPL-MCNC: 0.7 MG/DL (ref 0.7–1.3)
DIFFERENTIAL METHOD BLD: ABNORMAL
EOSINOPHIL # BLD: 0.4 K/UL (ref 0–0.4)
EOSINOPHIL NFR BLD: 5 % (ref 0–7)
ERYTHROCYTE [DISTWIDTH] IN BLOOD BY AUTOMATED COUNT: 13.6 % (ref 11.5–14.5)
GLUCOSE SERPL-MCNC: 126 MG/DL (ref 65–100)
HCT VFR BLD AUTO: 36 % (ref 36.6–50.3)
HGB BLD-MCNC: 11.3 G/DL (ref 12.1–17)
IMM GRANULOCYTES # BLD AUTO: 0 K/UL (ref 0–0.04)
IMM GRANULOCYTES NFR BLD AUTO: 0 % (ref 0–0.5)
LYMPHOCYTES # BLD: 2.2 K/UL (ref 0.8–3.5)
LYMPHOCYTES NFR BLD: 28 % (ref 12–49)
MCH RBC QN AUTO: 29.8 PG (ref 26–34)
MCHC RBC AUTO-ENTMCNC: 31.4 G/DL (ref 30–36.5)
MCV RBC AUTO: 95 FL (ref 80–99)
MONOCYTES # BLD: 0.6 K/UL (ref 0–1)
MONOCYTES NFR BLD: 8 % (ref 5–13)
NEUTS SEG # BLD: 4.6 K/UL (ref 1.8–8)
NEUTS SEG NFR BLD: 58 % (ref 32–75)
NRBC # BLD: 0 K/UL (ref 0–0.01)
NRBC BLD-RTO: 0 PER 100 WBC
PLATELET # BLD AUTO: 354 K/UL (ref 150–400)
PMV BLD AUTO: 9.9 FL (ref 8.9–12.9)
POTASSIUM SERPL-SCNC: 3.1 MMOL/L (ref 3.5–5.1)
RBC # BLD AUTO: 3.79 M/UL (ref 4.1–5.7)
SODIUM SERPL-SCNC: 140 MMOL/L (ref 136–145)
THERAPEUTIC RANGE,PTTT: ABNORMAL SEC (ref 82–109)
THERAPEUTIC RANGE,PTTT: ABNORMAL SEC (ref 82–109)
WBC # BLD AUTO: 7.8 K/UL (ref 4.1–11.1)

## 2021-12-25 PROCEDURE — 74011250637 HC RX REV CODE- 250/637: Performed by: NURSE PRACTITIONER

## 2021-12-25 PROCEDURE — 74011000258 HC RX REV CODE- 258: Performed by: HOSPITALIST

## 2021-12-25 PROCEDURE — 74011250636 HC RX REV CODE- 250/636: Performed by: NURSE PRACTITIONER

## 2021-12-25 PROCEDURE — 85730 THROMBOPLASTIN TIME PARTIAL: CPT

## 2021-12-25 PROCEDURE — 74011000250 HC RX REV CODE- 250: Performed by: NURSE PRACTITIONER

## 2021-12-25 PROCEDURE — 36415 COLL VENOUS BLD VENIPUNCTURE: CPT

## 2021-12-25 PROCEDURE — 80048 BASIC METABOLIC PNL TOTAL CA: CPT

## 2021-12-25 PROCEDURE — 65270000029 HC RM PRIVATE

## 2021-12-25 PROCEDURE — 74011250636 HC RX REV CODE- 250/636: Performed by: HOSPITALIST

## 2021-12-25 PROCEDURE — 85025 COMPLETE CBC W/AUTO DIFF WBC: CPT

## 2021-12-25 PROCEDURE — 74011000250 HC RX REV CODE- 250: Performed by: HOSPITALIST

## 2021-12-25 PROCEDURE — 74011250637 HC RX REV CODE- 250/637: Performed by: HOSPITALIST

## 2021-12-25 RX ADMIN — PIPERACILLIN AND TAZOBACTAM 3.38 G: 3; .375 INJECTION, POWDER, LYOPHILIZED, FOR SOLUTION INTRAVENOUS at 06:45

## 2021-12-25 RX ADMIN — KETOTIFEN FUMARATE 1 DROP: 0.35 SOLUTION/ DROPS OPHTHALMIC at 22:17

## 2021-12-25 RX ADMIN — PREDNISOLONE ACETATE 1 DROP: 10 SUSPENSION/ DROPS OPHTHALMIC at 22:17

## 2021-12-25 RX ADMIN — MIDODRINE HYDROCHLORIDE 5 MG: 5 TABLET ORAL at 13:16

## 2021-12-25 RX ADMIN — PREDNISOLONE ACETATE 1 DROP: 10 SUSPENSION/ DROPS OPHTHALMIC at 09:00

## 2021-12-25 RX ADMIN — HEPARIN SODIUM AND DEXTROSE 20 UNITS/KG/HR: 10000; 5 INJECTION INTRAVENOUS at 13:22

## 2021-12-25 RX ADMIN — HEPARIN SODIUM 1770 UNITS: 1000 INJECTION INTRAVENOUS; SUBCUTANEOUS at 17:05

## 2021-12-25 RX ADMIN — PIPERACILLIN AND TAZOBACTAM 3.38 G: 3; .375 INJECTION, POWDER, LYOPHILIZED, FOR SOLUTION INTRAVENOUS at 13:16

## 2021-12-25 RX ADMIN — LAMOTRIGINE 300 MG: 100 TABLET ORAL at 10:20

## 2021-12-25 RX ADMIN — LEVETIRACETAM 1000 MG: 10 INJECTION, SOLUTION INTRAVENOUS at 10:20

## 2021-12-25 RX ADMIN — LEVETIRACETAM 1000 MG: 10 INJECTION, SOLUTION INTRAVENOUS at 20:31

## 2021-12-25 RX ADMIN — PIPERACILLIN AND TAZOBACTAM 3.38 G: 3; .375 INJECTION, POWDER, LYOPHILIZED, FOR SOLUTION INTRAVENOUS at 20:30

## 2021-12-25 RX ADMIN — PHENYTOIN 100 MG: 125 SUSPENSION ORAL at 10:20

## 2021-12-25 RX ADMIN — MIDODRINE HYDROCHLORIDE 5 MG: 5 TABLET ORAL at 17:01

## 2021-12-25 RX ADMIN — LACTULOSE 15 ML: 10 SOLUTION ORAL at 10:20

## 2021-12-25 RX ADMIN — PHENYTOIN 130 MG: 125 SUSPENSION ORAL at 20:30

## 2021-12-25 RX ADMIN — PSYLLIUM HUSK 1 PACKET: 3.4 POWDER ORAL at 10:20

## 2021-12-25 RX ADMIN — Medication 10 ML: at 06:46

## 2021-12-25 RX ADMIN — HEPARIN SODIUM AND DEXTROSE 20 UNITS/KG/HR: 10000; 5 INJECTION INTRAVENOUS at 08:04

## 2021-12-25 RX ADMIN — LAMOTRIGINE 300 MG: 100 TABLET ORAL at 20:30

## 2021-12-25 RX ADMIN — Medication 10 ML: at 13:16

## 2021-12-25 RX ADMIN — CETIRIZINE HYDROCHLORIDE 10 MG: 10 TABLET, FILM COATED ORAL at 10:22

## 2021-12-25 RX ADMIN — HEPARIN SODIUM AND DEXTROSE 22 UNITS/KG/HR: 10000; 5 INJECTION INTRAVENOUS at 17:05

## 2021-12-25 RX ADMIN — KETOTIFEN FUMARATE 1 DROP: 0.35 SOLUTION/ DROPS OPHTHALMIC at 09:00

## 2021-12-25 RX ADMIN — MIDODRINE HYDROCHLORIDE 5 MG: 5 TABLET ORAL at 08:00

## 2021-12-25 RX ADMIN — FOLIC ACID 1 MG: 1 TABLET ORAL at 10:20

## 2021-12-25 NOTE — PROGRESS NOTES
Problem: Pressure Injury - Risk of  Goal: *Prevention of pressure injury  Description: Document Martin Scale and appropriate interventions in the flowsheet. Outcome: Progressing Towards Goal  Note: Pressure Injury Interventions:  Sensory Interventions: Assess changes in LOC    Moisture Interventions: Absorbent underpads    Activity Interventions: Assess need for specialty bed    Mobility Interventions: Float heels,PT/OT evaluation    Nutrition Interventions: Document food/fluid/supplement intake    Friction and Shear Interventions: HOB 30 degrees or less                Problem: Patient Education: Go to Patient Education Activity  Goal: Patient/Family Education  Outcome: Progressing Towards Goal     Problem: Patient Education: Go to Patient Education Activity  Goal: Patient/Family Education  Outcome: Progressing Towards Goal     Problem: Falls - Risk of  Goal: *Absence of Falls  Description: Document Jossy Fall Risk and appropriate interventions in the flowsheet.   Outcome: Progressing Towards Goal  Note: Fall Risk Interventions:       Mentation Interventions: Bed/chair exit alarm    Medication Interventions: Bed/chair exit alarm    Elimination Interventions: Call light in reach              Problem: Patient Education: Go to Patient Education Activity  Goal: Patient/Family Education  Outcome: Progressing Towards Goal

## 2021-12-25 NOTE — PROGRESS NOTES
Hospitalist Progress Note    Subjective:   Daily Progress Note: 12/25/2021 4:21 PM    Hospital Course: The patient is a 35-year-old male with a PMH significant for intellectual disabilities, seizure disorder, quadriplegia. Lives in a group home. Caregivers state he had not been eating or drinking for 4 days or having bowel movements. Unsuccessful enema. Then he started vomiting several times. Brought to the emergency room he was hypertensive, tachycardic and altered from baseline. Chest x-ray consistent with aspiration pneumonia. He presents tachycardic and febrile with hypertension. Admitted for further management of sepsis, aspiration pneumonia, respiratory failure with hypoxia, breakthrough seizure activity. Blood pressure decreasing required ICU admission and septic shock and started on Levophed. Started on vancomycin and Zosyn for aspiration pneumonia. Required nasal cannula oxygen for acute hypoxic respiratory failure. Weaned off pressor therapy and transferred to medical floor for further management. Left leg Doppler study positive for left external iliac vein thrombosis, left common femoral vein thrombosis and left proximal femoral vein thrombosis. He was started on Eliquis. Decreased antibiotic to monotherapy IV Zosyn. Started on Ketotifen and prednisone eyedrop for left sclera edema. Due to high risk of aspiration secondary to chronic disabilities and anatomical swallowing weakness, inability to maintain hydration and nutrition status he would benefit from feeding tube placement. GI has been consulted, plan for PEG tube placement Monday. Heparin drip for bridge therapy while holding Eliquis. NG tube placement for feeding tubes until PEG tube can be placed. Subjective: Follow up patient at the bedside. Unable to answer questions due to chronic baseline intellectual deficits.   Overnight no acute events per nursing staff    Current Facility-Administered Medications   Medication Dose Route Frequency    midodrine (PROAMATINE) tablet 5 mg  5 mg Oral TID WITH MEALS    prednisoLONE acetate (PRED FORTE) 1 % ophthalmic suspension 1 Drop  1 Drop Left Eye BID    heparin 25,000 units in D5W 250 ml infusion  12-25 Units/kg/hr IntraVENous TITRATE    heparin (porcine) 1,000 unit/mL injection 3,540 Units  60 Units/kg IntraVENous PRN    Or    heparin (porcine) 1,000 unit/mL injection 1,770 Units  30 Units/kg IntraVENous PRN    [Held by provider] apixaban (ELIQUIS) tablet 5 mg  5 mg Oral BID    0.9% sodium chloride infusion  75 mL/hr IntraVENous CONTINUOUS    levETIRAcetam in saline (iso-os) (KEPPRA) infusion 1,000 mg  1,000 mg IntraVENous BID    acetaminophen (TYLENOL) suppository 650 mg  650 mg Rectal Q4H PRN    ondansetron (ZOFRAN) injection 4 mg  4 mg IntraVENous Q6H PRN    phenytoin (DILANTIN) 100 mg/4 mL oral suspension 100 mg  100 mg Oral DAILY    phenytoin (DILANTIN) 100 mg/4 mL oral suspension 130 mg  130 mg Oral QHS    0.9% sodium chloride infusion 1,000 mL  1,000 mL IntraVENous CONTINUOUS    0.9% sodium chloride infusion 1,000 mL  1,000 mL IntraVENous CONTINUOUS    cetirizine (ZYRTEC) tablet 10 mg  10 mg Oral DAILY    folic acid (FOLVITE) tablet 1 mg  1 mg Oral DAILY    ketotifen (ZADITOR) 0.025 % (0.035 %) ophthalmic solution 1 Drop  1 Drop Both Eyes BID    lactulose (CHRONULAC) 10 gram/15 mL solution 15 mL  10 g Oral DAILY    lamoTRIgine (LaMICtal) tablet 300 mg  300 mg Oral BID    psyllium husk-aspartame (METAMUCIL FIBER) packet 1 Packet  1 Packet Oral DAILY    sodium chloride (NS) flush 5-40 mL  5-40 mL IntraVENous Q8H    sodium chloride (NS) flush 5-40 mL  5-40 mL IntraVENous PRN    acetaminophen (TYLENOL) tablet 650 mg  650 mg Oral Q4H PRN    albuterol (PROVENTIL HFA, VENTOLIN HFA, PROAIR HFA) inhaler 2 Puff  2 Puff Inhalation Q4H PRN    piperacillin-tazobactam (ZOSYN) 3.375 g in 0.9% sodium chloride (MBP/ADV) 100 mL MBP  3.375 g IntraVENous Q8H        REVIEW OF SYSTEMS    Review of Systems   Unable to perform ROS: Mental acuity        Objective:     Visit Vitals  /60   Pulse 98   Temp 98.5 °F (36.9 °C)   Resp 20   Ht 5' (1.524 m)   Wt 59 kg (130 lb)   SpO2 93%   BMI 25.39 kg/m²    O2 Flow Rate (L/min): 0 l/min O2 Device: None (Room air)    Temp (24hrs), Av.2 °F (37.3 °C), Min:98.5 °F (36.9 °C), Max:99.8 °F (37.7 °C)      No intake/output data recorded.  1901 -  0700  In: 120   Out: -     PHYSICAL EXAM:    Physical Exam  Constitutional:       Appearance: He is ill-appearing. HENT:      Nose: Congestion present. Cardiovascular:      Rate and Rhythm: Regular rhythm. Tachycardia present. Pulmonary:      Effort: No respiratory distress. Breath sounds: Rhonchi present. Abdominal:      General: Bowel sounds are normal.   Musculoskeletal:         General: Deformity present. Comments: Contracted, quadriplegic, bedbound   Skin:     Coloration: Skin is pale. Neurological:      Mental Status: Mental status is at baseline. Comments: Aphasic with intellectual disabilities          Data Review    Recent Results (from the past 24 hour(s))   PTT    Collection Time: 21  7:20 PM   Result Value Ref Range    aPTT 75.7 (H) 21.2 - 34.1 sec    aPTT, therapeutic range   82 - 109 sec   CBC WITH AUTOMATED DIFF    Collection Time: 21  5:50 AM   Result Value Ref Range    WBC 7.8 4.1 - 11.1 K/uL    RBC 3.79 (L) 4.10 - 5.70 M/uL    HGB 11.3 (L) 12.1 - 17.0 g/dL    HCT 36.0 (L) 36.6 - 50.3 %    MCV 95.0 80.0 - 99.0 FL    MCH 29.8 26.0 - 34.0 PG    MCHC 31.4 30.0 - 36.5 g/dL    RDW 13.6 11.5 - 14.5 %    PLATELET 128 670 - 602 K/uL    MPV 9.9 8.9 - 12.9 FL    NRBC 0.0 0.0  WBC    ABSOLUTE NRBC 0.00 0.00 - 0.01 K/uL    NEUTROPHILS 58 32 - 75 %    LYMPHOCYTES 28 12 - 49 %    MONOCYTES 8 5 - 13 %    EOSINOPHILS 5 0 - 7 %    BASOPHILS 1 0 - 1 %    IMMATURE GRANULOCYTES 0 0 - 0.5 %    ABS. NEUTROPHILS 4.6 1.8 - 8.0 K/UL    ABS.  LYMPHOCYTES 2.2 0.8 - 3.5 K/UL    ABS. MONOCYTES 0.6 0.0 - 1.0 K/UL    ABS. EOSINOPHILS 0.4 0.0 - 0.4 K/UL    ABS. BASOPHILS 0.1 0.0 - 0.1 K/UL    ABS. IMM. GRANS. 0.0 0.00 - 0.04 K/UL    DF AUTOMATED     METABOLIC PANEL, BASIC    Collection Time: 12/25/21  5:50 AM   Result Value Ref Range    Sodium 140 136 - 145 mmol/L    Potassium 3.1 (L) 3.5 - 5.1 mmol/L    Chloride 106 97 - 108 mmol/L    CO2 27 21 - 32 mmol/L    Anion gap 7 5 - 15 mmol/L    Glucose 126 (H) 65 - 100 mg/dL    BUN 3 (L) 6 - 20 mg/dL    Creatinine 0.70 0.70 - 1.30 mg/dL    BUN/Creatinine ratio 4 (L) 12 - 20      GFR est AA >60 >60 ml/min/1.73m2    GFR est non-AA >60 >60 ml/min/1.73m2    Calcium 8.8 8.5 - 10.1 mg/dL   PTT    Collection Time: 12/25/21  5:50 AM   Result Value Ref Range    aPTT 110.0 (HH) 21.2 - 34.1 sec    aPTT, therapeutic range   82 - 109 sec   PTT    Collection Time: 12/25/21  3:49 PM   Result Value Ref Range    aPTT 65.1 (H) 21.2 - 34.1 sec    aPTT, therapeutic range   82 - 109 sec       XR CHEST PORT   Final Result   1. Nasogastric tube tip overlies the right upper quadrant, likely the gastric   antrum or duodenal bulb. 2. Persistent reticulonodular opacities in both lungs. CTA CHEST W OR W WO CONT   Final Result   1. Extensive bilateral reticulonodular infiltrates throughout both lungs similar   to the previous study although both lungs were not included in their entirety on   the previous study. Findings may represent infectious, inflammatory or   neoplastic process. Recommend follow-up to. No evidence of pulmonary embolus,   aortic aneurysm or aortic dissection. 3. Borderline enlarged gastrohepatic lymph node. 4. Thickened esophagus, recommend clinical evaluation. DUPLEX LOWER EXT VENOUS BILAT   Final Result      XR CHEST PORT   Final Result   No significant interval change. CT ABD PELV W CONT   Final Result      1. Small focus of intraluminal gas within the urinary bladder.  Please correlate   for recent instrumentation or other causes of intraluminal gas, including an   infectious process. 2. Somewhat reticular nodular opacities in the lung bases, suboptimally   evaluated due to respiratory motion. Some of these have what appears to be a   tree and bud morphology, suggesting either chronic aspiration or an infectious   or inflammatory small airways disease. Otherwise a fibrotic process should be   considered. This would be better characterized with dedicated chest CT as   clinically warranted. 3. Underdistention versus circumferential wall thickening of the distal   esophagus. 4. Probable left trochanteric bursitis with sterility of the fluid   indeterminate. 5. Probable myositis ossificans surrounding the left hip. Please correlate for   history of prior trauma. 6. Enlarged lymph nodes in the upper abdomen, nonspecific. Comparison with   outside imaging would be helpful, if available, to determine longer term   stability. Otherwise short interval follow-up in 3 months is recommended. XR CHEST SNGL V   Final Result   Patchy opacities within the lungs, nonspecific, but leading differential   considerations include infection or pulmonary edema. Superimposed acute disease   on an underlying chronic interstitial process is also possible. Radiographic   follow-up is recommended to exclude other causes of opacification. XR ABD (KUB)   Final Result   Mildly prominent gas-filled loops of bowel within the central abdomen with   overall nonobstructive bowel gas pattern.           Active Problems:    Sepsis ([address] (12/17/2021)      Aspiration pneumonia ([address] (12/17/2021)      Respiratory failure with hypoxia ([address] (12/21/2021)      DVT (deep venous thrombosis) ([address] (12/23/2021)        Assessment/Plan:       Respiratory failure with hypoxia  Aspiration pneumonia  Continue oxygen as needed to maintain saturations greater than 92%: Currently on 3 L  Continues on IV Zosyn day # 7/10  ID following  Speech evaluation pending recommending PEG placement  GI consulted for feeding tube placement due to high risk aspiration and inability to meet nutrition and hydration needs scheduled for Monday  NG tube placement to start feeding until PEG tube is placed    Septic shockresolved  Off pressors    Seizure disorder  Continue home medication at same dose most likely subtherapeutic levels caused from med med interaction  Continue Keppra infusion 1000 mg twice daily  Dilantin 100 mg daily, 130 mg at bedtime  Neurology consulted    Intellectual disabilities  Quadriplegia  Plan return to group facility when stable    DVTs  Doppler study positive for left external iliac vein, left common femoral vein and left proximal femoral vein thrombosis  Per oncology hematology consult will hold Eliquis for pending PEG tube placement and start heparin bridge therapy. Eliquis can be restarted 12 hours after PEG tube placement  Heparin low-dose drip    Hypotension  Started on midodrine twice daily 5 mg    Hypokalemia  Replete and monitor    DVT Prophylaxis: Eliquis  Code Status: Full Code  POA/NOK:    Disposition and discharge barriers:   · Continue treatment for aspiration pneumonia including IV antibiotics and oxygen  · GI and nutrition consulted for feeding tube placement scheduled for Monday  Plan return to group home or skilled nursing facility   will consult case management______________________________________________________________________________    Kwaku Peace MD    This is dictation was done by dragon, computer voice recognition software. Quite often unanticipated grammatical, syntax, homophones and other interpretive errors or inadvertently transcribed by the computer software. Please excuse errors that have escaped final proofreading. Thank you.

## 2021-12-25 NOTE — PROGRESS NOTES
Problem: Pressure Injury - Risk of  Goal: *Prevention of pressure injury  Description: Document Martin Scale and appropriate interventions in the flowsheet.   Outcome: Progressing Towards Goal  Note: Pressure Injury Interventions:  Sensory Interventions: Assess changes in LOC    Moisture Interventions: Absorbent underpads    Activity Interventions: Assess need for specialty bed    Mobility Interventions: Float heels,PT/OT evaluation    Nutrition Interventions: Document food/fluid/supplement intake    Friction and Shear Interventions: HOB 30 degrees or less

## 2021-12-26 LAB
ANION GAP SERPL CALC-SCNC: 5 MMOL/L (ref 5–15)
APTT PPP: 109.8 SEC (ref 21.2–34.1)
APTT PPP: 96.6 SEC (ref 21.2–34.1)
BASOPHILS # BLD: 0.1 K/UL (ref 0–0.1)
BASOPHILS NFR BLD: 1 % (ref 0–1)
BUN SERPL-MCNC: 4 MG/DL (ref 6–20)
BUN/CREAT SERPL: 6 (ref 12–20)
CA-I BLD-MCNC: 8.6 MG/DL (ref 8.5–10.1)
CHLORIDE SERPL-SCNC: 107 MMOL/L (ref 97–108)
CO2 SERPL-SCNC: 28 MMOL/L (ref 21–32)
CREAT SERPL-MCNC: 0.66 MG/DL (ref 0.7–1.3)
DIFFERENTIAL METHOD BLD: ABNORMAL
EOSINOPHIL # BLD: 0.5 K/UL (ref 0–0.4)
EOSINOPHIL NFR BLD: 6 % (ref 0–7)
ERYTHROCYTE [DISTWIDTH] IN BLOOD BY AUTOMATED COUNT: 13.9 % (ref 11.5–14.5)
GLUCOSE SERPL-MCNC: 102 MG/DL (ref 65–100)
HCT VFR BLD AUTO: 34.4 % (ref 36.6–50.3)
HGB BLD-MCNC: 10.7 G/DL (ref 12.1–17)
IMM GRANULOCYTES # BLD AUTO: 0 K/UL (ref 0–0.04)
IMM GRANULOCYTES NFR BLD AUTO: 0 % (ref 0–0.5)
LYMPHOCYTES # BLD: 2 K/UL (ref 0.8–3.5)
LYMPHOCYTES NFR BLD: 29 % (ref 12–49)
MCH RBC QN AUTO: 30.5 PG (ref 26–34)
MCHC RBC AUTO-ENTMCNC: 31.1 G/DL (ref 30–36.5)
MCV RBC AUTO: 98 FL (ref 80–99)
MONOCYTES # BLD: 0.6 K/UL (ref 0–1)
MONOCYTES NFR BLD: 9 % (ref 5–13)
NEUTS SEG # BLD: 3.9 K/UL (ref 1.8–8)
NEUTS SEG NFR BLD: 55 % (ref 32–75)
NRBC # BLD: 0 K/UL (ref 0–0.01)
NRBC BLD-RTO: 0 PER 100 WBC
PLATELET # BLD AUTO: 335 K/UL (ref 150–400)
PMV BLD AUTO: 10 FL (ref 8.9–12.9)
POTASSIUM SERPL-SCNC: 3.2 MMOL/L (ref 3.5–5.1)
RBC # BLD AUTO: 3.51 M/UL (ref 4.1–5.7)
SODIUM SERPL-SCNC: 140 MMOL/L (ref 136–145)
THERAPEUTIC RANGE,PTTT: ABNORMAL SEC (ref 82–109)
THERAPEUTIC RANGE,PTTT: ABNORMAL SEC (ref 82–109)
WBC # BLD AUTO: 7.1 K/UL (ref 4.1–11.1)

## 2021-12-26 PROCEDURE — 85025 COMPLETE CBC W/AUTO DIFF WBC: CPT

## 2021-12-26 PROCEDURE — 74011250636 HC RX REV CODE- 250/636: Performed by: HOSPITALIST

## 2021-12-26 PROCEDURE — 74011250636 HC RX REV CODE- 250/636: Performed by: NURSE PRACTITIONER

## 2021-12-26 PROCEDURE — 80048 BASIC METABOLIC PNL TOTAL CA: CPT

## 2021-12-26 PROCEDURE — 74011250637 HC RX REV CODE- 250/637: Performed by: HOSPITALIST

## 2021-12-26 PROCEDURE — 74011250637 HC RX REV CODE- 250/637: Performed by: NURSE PRACTITIONER

## 2021-12-26 PROCEDURE — 85730 THROMBOPLASTIN TIME PARTIAL: CPT

## 2021-12-26 PROCEDURE — 74011000258 HC RX REV CODE- 258: Performed by: HOSPITALIST

## 2021-12-26 PROCEDURE — 36415 COLL VENOUS BLD VENIPUNCTURE: CPT

## 2021-12-26 PROCEDURE — 65270000029 HC RM PRIVATE

## 2021-12-26 RX ADMIN — LACTULOSE 15 ML: 10 SOLUTION ORAL at 09:19

## 2021-12-26 RX ADMIN — PIPERACILLIN AND TAZOBACTAM 3.38 G: 3; .375 INJECTION, POWDER, LYOPHILIZED, FOR SOLUTION INTRAVENOUS at 20:34

## 2021-12-26 RX ADMIN — HEPARIN SODIUM AND DEXTROSE 22 UNITS/KG/HR: 10000; 5 INJECTION INTRAVENOUS at 09:16

## 2021-12-26 RX ADMIN — MIDODRINE HYDROCHLORIDE 5 MG: 5 TABLET ORAL at 12:51

## 2021-12-26 RX ADMIN — MIDODRINE HYDROCHLORIDE 5 MG: 5 TABLET ORAL at 16:08

## 2021-12-26 RX ADMIN — PREDNISOLONE ACETATE 1 DROP: 10 SUSPENSION/ DROPS OPHTHALMIC at 20:26

## 2021-12-26 RX ADMIN — LEVETIRACETAM 1000 MG: 10 INJECTION, SOLUTION INTRAVENOUS at 09:19

## 2021-12-26 RX ADMIN — PHENYTOIN 130 MG: 125 SUSPENSION ORAL at 20:26

## 2021-12-26 RX ADMIN — ACETAMINOPHEN 650 MG: 325 TABLET ORAL at 20:26

## 2021-12-26 RX ADMIN — LAMOTRIGINE 300 MG: 100 TABLET ORAL at 09:19

## 2021-12-26 RX ADMIN — PIPERACILLIN AND TAZOBACTAM 3.38 G: 3; .375 INJECTION, POWDER, LYOPHILIZED, FOR SOLUTION INTRAVENOUS at 05:29

## 2021-12-26 RX ADMIN — KETOTIFEN FUMARATE 1 DROP: 0.35 SOLUTION/ DROPS OPHTHALMIC at 09:20

## 2021-12-26 RX ADMIN — SODIUM CHLORIDE 75 ML/HR: 9 INJECTION, SOLUTION INTRAVENOUS at 16:08

## 2021-12-26 RX ADMIN — PIPERACILLIN AND TAZOBACTAM 3.38 G: 3; .375 INJECTION, POWDER, LYOPHILIZED, FOR SOLUTION INTRAVENOUS at 12:51

## 2021-12-26 RX ADMIN — PSYLLIUM HUSK 1 PACKET: 3.4 POWDER ORAL at 09:19

## 2021-12-26 RX ADMIN — PHENYTOIN 100 MG: 125 SUSPENSION ORAL at 09:19

## 2021-12-26 RX ADMIN — MIDODRINE HYDROCHLORIDE 5 MG: 5 TABLET ORAL at 09:19

## 2021-12-26 RX ADMIN — KETOTIFEN FUMARATE 1 DROP: 0.35 SOLUTION/ DROPS OPHTHALMIC at 20:26

## 2021-12-26 RX ADMIN — CETIRIZINE HYDROCHLORIDE 10 MG: 10 TABLET, FILM COATED ORAL at 09:19

## 2021-12-26 RX ADMIN — LAMOTRIGINE 300 MG: 100 TABLET ORAL at 20:27

## 2021-12-26 RX ADMIN — FOLIC ACID 1 MG: 1 TABLET ORAL at 09:20

## 2021-12-26 RX ADMIN — PREDNISOLONE ACETATE 1 DROP: 10 SUSPENSION/ DROPS OPHTHALMIC at 09:20

## 2021-12-26 RX ADMIN — LEVETIRACETAM 1000 MG: 10 INJECTION, SOLUTION INTRAVENOUS at 20:24

## 2021-12-26 NOTE — PROGRESS NOTES
Problem: Pressure Injury - Risk of  Goal: *Prevention of pressure injury  Description: Document Martin Scale and appropriate interventions in the flowsheet.   Outcome: Progressing Towards Goal  Note: Pressure Injury Interventions:  Sensory Interventions: Assess changes in LOC    Moisture Interventions: Absorbent underpads    Activity Interventions: Assess need for specialty bed    Mobility Interventions: Float heels,PT/OT evaluation    Nutrition Interventions: Document food/fluid/supplement intake    Friction and Shear Interventions: HOB 30 degrees or less     Pt turned every 2 hours

## 2021-12-26 NOTE — PROGRESS NOTES
Hospitalist Progress Note    Subjective:   Daily Progress Note: 12/26/2021 4:21 PM    Hospital Course: The patient is a 42-year-old male with a PMH significant for intellectual disabilities, seizure disorder, quadriplegia. Lives in a group home. Caregivers state he had not been eating or drinking for 4 days or having bowel movements. Unsuccessful enema. Then he started vomiting several times. Brought to the emergency room he was hypertensive, tachycardic and altered from baseline. Chest x-ray consistent with aspiration pneumonia. He presents tachycardic and febrile with hypertension. Admitted for further management of sepsis, aspiration pneumonia, respiratory failure with hypoxia, breakthrough seizure activity. Blood pressure decreasing required ICU admission and septic shock and started on Levophed. Started on vancomycin and Zosyn for aspiration pneumonia. Required nasal cannula oxygen for acute hypoxic respiratory failure. Weaned off pressor therapy and transferred to medical floor for further management. Left leg Doppler study positive for left external iliac vein thrombosis, left common femoral vein thrombosis and left proximal femoral vein thrombosis. He was started on Eliquis. Decreased antibiotic to monotherapy IV Zosyn. Started on Ketotifen and prednisone eyedrop for left sclera edema. Due to high risk of aspiration secondary to chronic disabilities and anatomical swallowing weakness, inability to maintain hydration and nutrition status he would benefit from feeding tube placement. GI has been consulted, plan for PEG tube placement Monday. Heparin drip for bridge therapy while holding Eliquis. NG tube placement for feeding tubes until PEG tube can be placed. Subjective: Follow up patient at the bedside. Unable to answer questions due to chronic baseline intellectual deficits.   Overnight no acute events per nursing staff, BP low side    Current Facility-Administered Medications Medication Dose Route Frequency    midodrine (PROAMATINE) tablet 5 mg  5 mg Oral TID WITH MEALS    prednisoLONE acetate (PRED FORTE) 1 % ophthalmic suspension 1 Drop  1 Drop Left Eye BID    heparin 25,000 units in D5W 250 ml infusion  12-25 Units/kg/hr IntraVENous TITRATE    heparin (porcine) 1,000 unit/mL injection 3,540 Units  60 Units/kg IntraVENous PRN    Or    heparin (porcine) 1,000 unit/mL injection 1,770 Units  30 Units/kg IntraVENous PRN    [Held by provider] apixaban (ELIQUIS) tablet 5 mg  5 mg Oral BID    0.9% sodium chloride infusion  75 mL/hr IntraVENous CONTINUOUS    levETIRAcetam in saline (iso-os) (KEPPRA) infusion 1,000 mg  1,000 mg IntraVENous BID    acetaminophen (TYLENOL) suppository 650 mg  650 mg Rectal Q4H PRN    ondansetron (ZOFRAN) injection 4 mg  4 mg IntraVENous Q6H PRN    phenytoin (DILANTIN) 100 mg/4 mL oral suspension 100 mg  100 mg Oral DAILY    phenytoin (DILANTIN) 100 mg/4 mL oral suspension 130 mg  130 mg Oral QHS    0.9% sodium chloride infusion 1,000 mL  1,000 mL IntraVENous CONTINUOUS    0.9% sodium chloride infusion 1,000 mL  1,000 mL IntraVENous CONTINUOUS    cetirizine (ZYRTEC) tablet 10 mg  10 mg Oral DAILY    folic acid (FOLVITE) tablet 1 mg  1 mg Oral DAILY    ketotifen (ZADITOR) 0.025 % (0.035 %) ophthalmic solution 1 Drop  1 Drop Both Eyes BID    lactulose (CHRONULAC) 10 gram/15 mL solution 15 mL  10 g Oral DAILY    lamoTRIgine (LaMICtal) tablet 300 mg  300 mg Oral BID    psyllium husk-aspartame (METAMUCIL FIBER) packet 1 Packet  1 Packet Oral DAILY    sodium chloride (NS) flush 5-40 mL  5-40 mL IntraVENous PRN    acetaminophen (TYLENOL) tablet 650 mg  650 mg Oral Q4H PRN    albuterol (PROVENTIL HFA, VENTOLIN HFA, PROAIR HFA) inhaler 2 Puff  2 Puff Inhalation Q4H PRN    piperacillin-tazobactam (ZOSYN) 3.375 g in 0.9% sodium chloride (MBP/ADV) 100 mL MBP  3.375 g IntraVENous Q8H        REVIEW OF SYSTEMS    Review of Systems   Unable to perform ROS: Mental acuity        Objective:     Visit Vitals  BP 92/71 (BP 1 Location: Left lower arm, BP Patient Position: At rest)   Pulse 87   Temp 98.4 °F (36.9 °C)   Resp 18   Ht 5' (1.524 m)   Wt 59 kg (130 lb)   SpO2 91%   BMI 25.39 kg/m²    O2 Flow Rate (L/min): 0 l/min O2 Device: None (Room air)    Temp (24hrs), Av.4 °F (36.9 °C), Min:98.3 °F (36.8 °C), Max:98.4 °F (36.9 °C)       07 - 1900  In: -   Out: 1000 [Urine:1000]  1901 - 700  In: 120   Out: 1     PHYSICAL EXAM:    Physical Exam  Constitutional:       Appearance: He is ill-appearing. HENT:      Nose: Congestion present. Cardiovascular:      Rate and Rhythm: Regular rhythm. Tachycardia present. Pulmonary:      Effort: No respiratory distress. Breath sounds: Rhonchi present. Abdominal:      General: Bowel sounds are normal.   Musculoskeletal:         General: Deformity present. Comments: Contracted, quadriplegic, bedbound   Skin:     Coloration: Skin is pale. Neurological:      Mental Status: Mental status is at baseline. Comments: Aphasic with intellectual disabilities          Data Review    Recent Results (from the past 24 hour(s))   PTT    Collection Time: 21 10:05 PM   Result Value Ref Range    aPTT 109.8 (HH) 21.2 - 34.1 sec    aPTT, therapeutic range   82 - 109 sec   CBC WITH AUTOMATED DIFF    Collection Time: 21  6:14 AM   Result Value Ref Range    WBC 7.1 4.1 - 11.1 K/uL    RBC 3.51 (L) 4.10 - 5.70 M/uL    HGB 10.7 (L) 12.1 - 17.0 g/dL    HCT 34.4 (L) 36.6 - 50.3 %    MCV 98.0 80.0 - 99.0 FL    MCH 30.5 26.0 - 34.0 PG    MCHC 31.1 30.0 - 36.5 g/dL    RDW 13.9 11.5 - 14.5 %    PLATELET 603 844 - 362 K/uL    MPV 10.0 8.9 - 12.9 FL    NRBC 0.0 0.0  WBC    ABSOLUTE NRBC 0.00 0.00 - 0.01 K/uL    NEUTROPHILS 55 32 - 75 %    LYMPHOCYTES 29 12 - 49 %    MONOCYTES 9 5 - 13 %    EOSINOPHILS 6 0 - 7 %    BASOPHILS 1 0 - 1 %    IMMATURE GRANULOCYTES 0 0 - 0.5 %    ABS.  NEUTROPHILS 3.9 1.8 - 8.0 K/UL    ABS. LYMPHOCYTES 2.0 0.8 - 3.5 K/UL    ABS. MONOCYTES 0.6 0.0 - 1.0 K/UL    ABS. EOSINOPHILS 0.5 (H) 0.0 - 0.4 K/UL    ABS. BASOPHILS 0.1 0.0 - 0.1 K/UL    ABS. IMM. GRANS. 0.0 0.00 - 0.04 K/UL    DF AUTOMATED     METABOLIC PANEL, BASIC    Collection Time: 12/26/21  6:14 AM   Result Value Ref Range    Sodium 140 136 - 145 mmol/L    Potassium 3.2 (L) 3.5 - 5.1 mmol/L    Chloride 107 97 - 108 mmol/L    CO2 28 21 - 32 mmol/L    Anion gap 5 5 - 15 mmol/L    Glucose 102 (H) 65 - 100 mg/dL    BUN 4 (L) 6 - 20 mg/dL    Creatinine 0.66 (L) 0.70 - 1.30 mg/dL    BUN/Creatinine ratio 6 (L) 12 - 20      GFR est AA >60 >60 ml/min/1.73m2    GFR est non-AA >60 >60 ml/min/1.73m2    Calcium 8.6 8.5 - 10.1 mg/dL   PTT    Collection Time: 12/26/21  6:14 AM   Result Value Ref Range    aPTT 96.6 (H) 21.2 - 34.1 sec    aPTT, therapeutic range   82 - 109 sec       XR CHEST PORT   Final Result   1. Nasogastric tube tip overlies the right upper quadrant, likely the gastric   antrum or duodenal bulb. 2. Persistent reticulonodular opacities in both lungs. CTA CHEST W OR W WO CONT   Final Result   1. Extensive bilateral reticulonodular infiltrates throughout both lungs similar   to the previous study although both lungs were not included in their entirety on   the previous study. Findings may represent infectious, inflammatory or   neoplastic process. Recommend follow-up to. No evidence of pulmonary embolus,   aortic aneurysm or aortic dissection. 3. Borderline enlarged gastrohepatic lymph node. 4. Thickened esophagus, recommend clinical evaluation. DUPLEX LOWER EXT VENOUS BILAT   Final Result      XR CHEST PORT   Final Result   No significant interval change. CT ABD PELV W CONT   Final Result      1. Small focus of intraluminal gas within the urinary bladder. Please correlate   for recent instrumentation or other causes of intraluminal gas, including an   infectious process.    2. Somewhat reticular nodular opacities in the lung bases, suboptimally   evaluated due to respiratory motion. Some of these have what appears to be a   tree and bud morphology, suggesting either chronic aspiration or an infectious   or inflammatory small airways disease. Otherwise a fibrotic process should be   considered. This would be better characterized with dedicated chest CT as   clinically warranted. 3. Underdistention versus circumferential wall thickening of the distal   esophagus. 4. Probable left trochanteric bursitis with sterility of the fluid   indeterminate. 5. Probable myositis ossificans surrounding the left hip. Please correlate for   history of prior trauma. 6. Enlarged lymph nodes in the upper abdomen, nonspecific. Comparison with   outside imaging would be helpful, if available, to determine longer term   stability. Otherwise short interval follow-up in 3 months is recommended. XR CHEST SNGL V   Final Result   Patchy opacities within the lungs, nonspecific, but leading differential   considerations include infection or pulmonary edema. Superimposed acute disease   on an underlying chronic interstitial process is also possible. Radiographic   follow-up is recommended to exclude other causes of opacification. XR ABD (KUB)   Final Result   Mildly prominent gas-filled loops of bowel within the central abdomen with   overall nonobstructive bowel gas pattern.           Active Problems:    Sepsis ([address] (12/17/2021)      Aspiration pneumonia ([address] (12/17/2021)      Respiratory failure with hypoxia ([address] (12/21/2021)      DVT (deep venous thrombosis) ([address] (12/23/2021)        Assessment/Plan:       Respiratory failure with hypoxia  Aspiration pneumonia  Continue oxygen as needed to maintain saturations greater than 92%: Currently on 3 L  Continues on IV Zosyn day # 7/10  ID following  Speech evaluation pending recommending PEG placement  GI consulted for feeding tube placement due to high risk aspiration and inability to meet nutrition and hydration needs scheduled for Monday  NG tube placement to start feeding until PEG tube is placed    Septic shockresolved  Off pressors    Seizure disorder  Continue home medication at same dose most likely subtherapeutic levels caused from med med interaction  Continue Keppra infusion 1000 mg twice daily  Dilantin 100 mg daily, 130 mg at bedtime  Neurology consulted    Intellectual disabilities  Quadriplegia  Plan return to group facility when stable    DVTs  Doppler study positive for left external iliac vein, left common femoral vein and left proximal femoral vein thrombosis  Per oncology hematology consult will hold Eliquis for pending PEG tube placement and start heparin bridge therapy. Eliquis can be restarted 12 hours after PEG tube placement  Heparin low-dose drip    Hypotension  Started on midodrine twice daily 5 mg    Hypokalemia  Replete and monitor    DVT Prophylaxis: Eliquis  Code Status: Full Code  · POA/NOK:    · GI and nutrition consulted for feeding tube placement scheduled for Monday  Plan return to group home or skilled nursing facility     will consult case management______________________________________________________________________________    Vazquez Morales MD    This is dictation was done by dragon, computer voice recognition software. Quite often unanticipated grammatical, syntax, homophones and other interpretive errors or inadvertently transcribed by the computer software. Please excuse errors that have escaped final proofreading. Thank you.

## 2021-12-26 NOTE — PROGRESS NOTES
Problem: Pressure Injury - Risk of  Goal: *Prevention of pressure injury  Description: Document Martin Scale and appropriate interventions in the flowsheet. Outcome: Progressing Towards Goal  Note: Pressure Injury Interventions:  Sensory Interventions: Assess changes in LOC    Moisture Interventions: Absorbent underpads    Activity Interventions: Assess need for specialty bed    Mobility Interventions: Float heels,PT/OT evaluation    Nutrition Interventions: Document food/fluid/supplement intake    Friction and Shear Interventions: HOB 30 degrees or less                Problem: Falls - Risk of  Goal: *Absence of Falls  Description: Document Jossy Fall Risk and appropriate interventions in the flowsheet.   Outcome: Progressing Towards Goal  Note: Fall Risk Interventions:       Mentation Interventions: Bed/chair exit alarm    Medication Interventions: Bed/chair exit alarm    Elimination Interventions: Call light in reach

## 2021-12-27 ENCOUNTER — ANESTHESIA (OUTPATIENT)
Dept: ENDOSCOPY | Age: 46
DRG: 720 | End: 2021-12-27
Payer: MEDICAID

## 2021-12-27 ENCOUNTER — APPOINTMENT (OUTPATIENT)
Dept: ENDOSCOPY | Age: 46
DRG: 720 | End: 2021-12-27
Attending: INTERNAL MEDICINE
Payer: MEDICAID

## 2021-12-27 ENCOUNTER — ANESTHESIA EVENT (OUTPATIENT)
Dept: ENDOSCOPY | Age: 46
DRG: 720 | End: 2021-12-27
Payer: MEDICAID

## 2021-12-27 LAB
ANION GAP SERPL CALC-SCNC: 7 MMOL/L (ref 5–15)
APTT PPP: 29.7 SEC (ref 21.2–34.1)
BASOPHILS # BLD: 0 K/UL (ref 0–0.1)
BASOPHILS NFR BLD: 1 % (ref 0–1)
BUN SERPL-MCNC: 4 MG/DL (ref 6–20)
BUN/CREAT SERPL: 6 (ref 12–20)
CA-I BLD-MCNC: 8.8 MG/DL (ref 8.5–10.1)
CHLORIDE SERPL-SCNC: 107 MMOL/L (ref 97–108)
CO2 SERPL-SCNC: 26 MMOL/L (ref 21–32)
CREAT SERPL-MCNC: 0.62 MG/DL (ref 0.7–1.3)
DIFFERENTIAL METHOD BLD: ABNORMAL
EOSINOPHIL # BLD: 0.5 K/UL (ref 0–0.4)
EOSINOPHIL NFR BLD: 6 % (ref 0–7)
ERYTHROCYTE [DISTWIDTH] IN BLOOD BY AUTOMATED COUNT: 13.9 % (ref 11.5–14.5)
GLUCOSE SERPL-MCNC: 90 MG/DL (ref 65–100)
HCT VFR BLD AUTO: 34.8 % (ref 36.6–50.3)
HGB BLD-MCNC: 11 G/DL (ref 12.1–17)
IMM GRANULOCYTES # BLD AUTO: 0 K/UL (ref 0–0.04)
IMM GRANULOCYTES NFR BLD AUTO: 0 % (ref 0–0.5)
LYMPHOCYTES # BLD: 1.6 K/UL (ref 0.8–3.5)
LYMPHOCYTES NFR BLD: 19 % (ref 12–49)
MCH RBC QN AUTO: 30.6 PG (ref 26–34)
MCHC RBC AUTO-ENTMCNC: 31.6 G/DL (ref 30–36.5)
MCV RBC AUTO: 96.7 FL (ref 80–99)
MONOCYTES # BLD: 0.6 K/UL (ref 0–1)
MONOCYTES NFR BLD: 7 % (ref 5–13)
NEUTS SEG # BLD: 5.6 K/UL (ref 1.8–8)
NEUTS SEG NFR BLD: 67 % (ref 32–75)
NRBC # BLD: 0 K/UL (ref 0–0.01)
NRBC BLD-RTO: 0 PER 100 WBC
PLATELET # BLD AUTO: 336 K/UL (ref 150–400)
PMV BLD AUTO: 10.1 FL (ref 8.9–12.9)
POTASSIUM SERPL-SCNC: 3.9 MMOL/L (ref 3.5–5.1)
RBC # BLD AUTO: 3.6 M/UL (ref 4.1–5.7)
SODIUM SERPL-SCNC: 140 MMOL/L (ref 136–145)
THERAPEUTIC RANGE,PTTT: NORMAL SEC (ref 82–109)
WBC # BLD AUTO: 8.4 K/UL (ref 4.1–11.1)

## 2021-12-27 PROCEDURE — 74011250636 HC RX REV CODE- 250/636: Performed by: NURSE ANESTHETIST, CERTIFIED REGISTERED

## 2021-12-27 PROCEDURE — 74011000250 HC RX REV CODE- 250: Performed by: NURSE ANESTHETIST, CERTIFIED REGISTERED

## 2021-12-27 PROCEDURE — 3E0G76Z INTRODUCTION OF NUTRITIONAL SUBSTANCE INTO UPPER GI, VIA NATURAL OR ARTIFICIAL OPENING: ICD-10-PCS | Performed by: INTERNAL MEDICINE

## 2021-12-27 PROCEDURE — 65270000029 HC RM PRIVATE

## 2021-12-27 PROCEDURE — 74011250637 HC RX REV CODE- 250/637: Performed by: HOSPITALIST

## 2021-12-27 PROCEDURE — 85730 THROMBOPLASTIN TIME PARTIAL: CPT

## 2021-12-27 PROCEDURE — 36415 COLL VENOUS BLD VENIPUNCTURE: CPT

## 2021-12-27 PROCEDURE — 99232 SBSQ HOSP IP/OBS MODERATE 35: CPT | Performed by: INTERNAL MEDICINE

## 2021-12-27 PROCEDURE — 0DH63UZ INSERTION OF FEEDING DEVICE INTO STOMACH, PERCUTANEOUS APPROACH: ICD-10-PCS | Performed by: INTERNAL MEDICINE

## 2021-12-27 PROCEDURE — 74011250636 HC RX REV CODE- 250/636: Performed by: HOSPITALIST

## 2021-12-27 PROCEDURE — 80048 BASIC METABOLIC PNL TOTAL CA: CPT

## 2021-12-27 PROCEDURE — 74011250636 HC RX REV CODE- 250/636: Performed by: NURSE PRACTITIONER

## 2021-12-27 PROCEDURE — 74011000258 HC RX REV CODE- 258: Performed by: HOSPITALIST

## 2021-12-27 PROCEDURE — 76040000019: Performed by: INTERNAL MEDICINE

## 2021-12-27 PROCEDURE — 76060000031 HC ANESTHESIA FIRST 0.5 HR: Performed by: INTERNAL MEDICINE

## 2021-12-27 PROCEDURE — 2709999900 HC NON-CHARGEABLE SUPPLY: Performed by: INTERNAL MEDICINE

## 2021-12-27 PROCEDURE — 77030005122 HC CATH GASTMY PEG BSC -B: Performed by: INTERNAL MEDICINE

## 2021-12-27 PROCEDURE — 85025 COMPLETE CBC W/AUTO DIFF WBC: CPT

## 2021-12-27 RX ORDER — SODIUM CHLORIDE, SODIUM LACTATE, POTASSIUM CHLORIDE, CALCIUM CHLORIDE 600; 310; 30; 20 MG/100ML; MG/100ML; MG/100ML; MG/100ML
INJECTION, SOLUTION INTRAVENOUS
Status: DISCONTINUED | OUTPATIENT
Start: 2021-12-27 | End: 2021-12-27 | Stop reason: HOSPADM

## 2021-12-27 RX ORDER — LIDOCAINE HYDROCHLORIDE 20 MG/ML
INJECTION, SOLUTION EPIDURAL; INFILTRATION; INTRACAUDAL; PERINEURAL AS NEEDED
Status: DISCONTINUED | OUTPATIENT
Start: 2021-12-27 | End: 2021-12-27 | Stop reason: HOSPADM

## 2021-12-27 RX ORDER — PROPOFOL 10 MG/ML
INJECTION, EMULSION INTRAVENOUS AS NEEDED
Status: DISCONTINUED | OUTPATIENT
Start: 2021-12-27 | End: 2021-12-27 | Stop reason: HOSPADM

## 2021-12-27 RX ORDER — SODIUM CHLORIDE 0.9 % (FLUSH) 0.9 %
5-40 SYRINGE (ML) INJECTION EVERY 8 HOURS
Status: DISCONTINUED | OUTPATIENT
Start: 2021-12-27 | End: 2021-12-27

## 2021-12-27 RX ORDER — SODIUM CHLORIDE 0.9 % (FLUSH) 0.9 %
5-40 SYRINGE (ML) INJECTION AS NEEDED
Status: DISCONTINUED | OUTPATIENT
Start: 2021-12-27 | End: 2022-01-01

## 2021-12-27 RX ORDER — SODIUM CHLORIDE 9 MG/ML
75 INJECTION, SOLUTION INTRAVENOUS CONTINUOUS
Status: DISCONTINUED | OUTPATIENT
Start: 2021-12-27 | End: 2021-12-29

## 2021-12-27 RX ADMIN — KETOTIFEN FUMARATE 1 DROP: 0.35 SOLUTION/ DROPS OPHTHALMIC at 08:17

## 2021-12-27 RX ADMIN — PREDNISOLONE ACETATE 1 DROP: 10 SUSPENSION/ DROPS OPHTHALMIC at 08:17

## 2021-12-27 RX ADMIN — PROPOFOL 50 MG: 10 INJECTION, EMULSION INTRAVENOUS at 11:00

## 2021-12-27 RX ADMIN — KETOTIFEN FUMARATE 1 DROP: 0.35 SOLUTION/ DROPS OPHTHALMIC at 20:17

## 2021-12-27 RX ADMIN — HEPARIN SODIUM AND DEXTROSE 22 UNITS/KG/HR: 10000; 5 INJECTION INTRAVENOUS at 04:44

## 2021-12-27 RX ADMIN — PREDNISOLONE ACETATE 1 DROP: 10 SUSPENSION/ DROPS OPHTHALMIC at 20:17

## 2021-12-27 RX ADMIN — PHENYTOIN 130 MG: 125 SUSPENSION ORAL at 20:17

## 2021-12-27 RX ADMIN — SODIUM CHLORIDE 75 ML/HR: 9 INJECTION, SOLUTION INTRAVENOUS at 08:21

## 2021-12-27 RX ADMIN — PIPERACILLIN AND TAZOBACTAM 3.38 G: 3; .375 INJECTION, POWDER, LYOPHILIZED, FOR SOLUTION INTRAVENOUS at 12:35

## 2021-12-27 RX ADMIN — SODIUM CHLORIDE 75 ML/HR: 9 INJECTION, SOLUTION INTRAVENOUS at 12:29

## 2021-12-27 RX ADMIN — SODIUM CHLORIDE, POTASSIUM CHLORIDE, SODIUM LACTATE AND CALCIUM CHLORIDE: 600; 310; 30; 20 INJECTION, SOLUTION INTRAVENOUS at 10:58

## 2021-12-27 RX ADMIN — LEVETIRACETAM 1000 MG: 10 INJECTION, SOLUTION INTRAVENOUS at 20:17

## 2021-12-27 RX ADMIN — LIDOCAINE HYDROCHLORIDE 100 MG: 20 INJECTION, SOLUTION EPIDURAL; INFILTRATION; INTRACAUDAL; PERINEURAL at 11:00

## 2021-12-27 RX ADMIN — PHENYLEPHRINE HYDROCHLORIDE 200 MCG: 10 INJECTION INTRAVENOUS at 11:10

## 2021-12-27 RX ADMIN — Medication 10 ML: at 12:32

## 2021-12-27 RX ADMIN — LAMOTRIGINE 300 MG: 100 TABLET ORAL at 20:17

## 2021-12-27 RX ADMIN — PIPERACILLIN AND TAZOBACTAM 3.38 G: 3; .375 INJECTION, POWDER, LYOPHILIZED, FOR SOLUTION INTRAVENOUS at 05:17

## 2021-12-27 RX ADMIN — LEVETIRACETAM 1000 MG: 10 INJECTION, SOLUTION INTRAVENOUS at 08:15

## 2021-12-27 RX ADMIN — PROPOFOL 100 MG: 10 INJECTION, EMULSION INTRAVENOUS at 11:01

## 2021-12-27 NOTE — ANESTHESIA PREPROCEDURE EVALUATION
Relevant Problems   RESPIRATORY SYSTEM   (+) Aspiration pneumonia (HCC)       Anesthetic History   No history of anesthetic complications            Review of Systems / Medical History  Patient summary reviewed, nursing notes reviewed and pertinent labs reviewed    Pulmonary  Within defined limits                 Neuro/Psych     seizures         Cardiovascular  Within defined limits                Exercise tolerance: <4 METS     GI/Hepatic/Renal  Within defined limits              Endo/Other  Within defined limits           Other Findings   Comments: H/o dvt         Past Medical History:   Diagnosis Date    Intellectual disability     Quadriplegia, unspecified (HCC)     Flexion contractures upper and lower extremities    Seizure disorder ([facility]        History reviewed. No pertinent surgical history.     Current Outpatient Medications   Medication Instructions    cetirizine (ZYRTEC) 10 mg, Oral, DAILY    folic acid (FOLVITE) 1 mg, Oral, DAILY    ketotifen (ZADITOR) 0.025 % (0.035 %) ophthalmic solution 1 Drop, Both Eyes, 2 TIMES DAILY    lactulose (CHRONULAC) 10 g, Oral, DAILY    lamoTRIgine (LAMICTAL) 300 mg, Oral, 2 TIMES DAILY    levETIRAcetam 1,000 mg, Oral, 2 TIMES DAILY    phenytoin ER (DILANTIN) 230 mg, Oral, DAILY, Schedule at noon     psyllium husk-aspartame (Natural Fiber Supplemnt,asprt,) 3.4 gram pwpk packet 1 Packet, Oral, DAILY       Current Facility-Administered Medications   Medication Dose Route Frequency    midodrine (PROAMATINE) tablet 5 mg  5 mg Oral TID WITH MEALS    prednisoLONE acetate (PRED FORTE) 1 % ophthalmic suspension 1 Drop  1 Drop Left Eye BID    heparin 25,000 units in D5W 250 ml infusion  12-25 Units/kg/hr IntraVENous TITRATE    heparin (porcine) 1,000 unit/mL injection 3,540 Units  60 Units/kg IntraVENous PRN    Or    heparin (porcine) 1,000 unit/mL injection 1,770 Units  30 Units/kg IntraVENous PRN    [Held by provider] apixaban (ELIQUIS) tablet 5 mg  5 mg Oral BID  0.9% sodium chloride infusion  75 mL/hr IntraVENous CONTINUOUS    levETIRAcetam in saline (iso-os) (KEPPRA) infusion 1,000 mg  1,000 mg IntraVENous BID    acetaminophen (TYLENOL) suppository 650 mg  650 mg Rectal Q4H PRN    ondansetron (ZOFRAN) injection 4 mg  4 mg IntraVENous Q6H PRN    phenytoin (DILANTIN) 100 mg/4 mL oral suspension 100 mg  100 mg Oral DAILY    phenytoin (DILANTIN) 100 mg/4 mL oral suspension 130 mg  130 mg Oral QHS    0.9% sodium chloride infusion 1,000 mL  1,000 mL IntraVENous CONTINUOUS    0.9% sodium chloride infusion 1,000 mL  1,000 mL IntraVENous CONTINUOUS    cetirizine (ZYRTEC) tablet 10 mg  10 mg Oral DAILY    folic acid (FOLVITE) tablet 1 mg  1 mg Oral DAILY    ketotifen (ZADITOR) 0.025 % (0.035 %) ophthalmic solution 1 Drop  1 Drop Both Eyes BID    lactulose (CHRONULAC) 10 gram/15 mL solution 15 mL  10 g Oral DAILY    lamoTRIgine (LaMICtal) tablet 300 mg  300 mg Oral BID    psyllium husk-aspartame (METAMUCIL FIBER) packet 1 Packet  1 Packet Oral DAILY    sodium chloride (NS) flush 5-40 mL  5-40 mL IntraVENous PRN    acetaminophen (TYLENOL) tablet 650 mg  650 mg Oral Q4H PRN    albuterol (PROVENTIL HFA, VENTOLIN HFA, PROAIR HFA) inhaler 2 Puff  2 Puff Inhalation Q4H PRN    piperacillin-tazobactam (ZOSYN) 3.375 g in 0.9% sodium chloride (MBP/ADV) 100 mL MBP  3.375 g IntraVENous Q8H       Patient Vitals for the past 24 hrs:   Temp Pulse Resp BP SpO2   12/27/21 0812 37.3 °C (99.1 °F) 94 18 95/61 93 %   12/26/21 2307 37.1 °C (98.7 °F) (!) 104 18 112/71 92 %   12/26/21 2020 37.5 °C (99.5 °F) (!) 113 19 117/67 90 %   12/26/21 1604 37.1 °C (98.7 °F) 90 20 100/77 93 %       Lab Results   Component Value Date/Time    WBC 7.1 12/26/2021 06:14 AM    HGB 10.7 (L) 12/26/2021 06:14 AM    HCT 34.4 (L) 12/26/2021 06:14 AM    PLATELET 588 78/29/5352 06:14 AM    MCV 98.0 12/26/2021 06:14 AM     Lab Results   Component Value Date/Time    Sodium 140 12/26/2021 06:14 AM    Potassium 3.2 (L) 12/26/2021 06:14 AM    Chloride 107 12/26/2021 06:14 AM    CO2 28 12/26/2021 06:14 AM    Anion gap 5 12/26/2021 06:14 AM    Glucose 102 (H) 12/26/2021 06:14 AM    BUN 4 (L) 12/26/2021 06:14 AM    Creatinine 0.66 (L) 12/26/2021 06:14 AM    BUN/Creatinine ratio 6 (L) 12/26/2021 06:14 AM    GFR est AA >60 12/26/2021 06:14 AM    GFR est non-AA >60 12/26/2021 06:14 AM    Calcium 8.6 12/26/2021 06:14 AM     No results found for: APTT, PTP, INR, INREXT  Lab Results   Component Value Date/Time    Glucose 102 (H) 12/26/2021 06:14 AM    Glucose (POC) 130 (H) 12/24/2021 03:11 PM     Physical Exam    Airway    TM Distance: 4 - 6 cm  Neck ROM: decreased range of motion   Mouth opening: Diminished (comment)    Comments: Unable to examine d/t CP and contractures Cardiovascular    Rhythm: regular  Rate: normal         Dental      Comments: Unable to examine   Pulmonary  Breath sounds clear to auscultation               Abdominal  GI exam deferred       Other Findings            Anesthetic Plan    ASA: 2  Anesthesia type: total IV anesthesia and MAC          Induction: Intravenous  Anesthetic plan and risks discussed with: Patient and Family

## 2021-12-27 NOTE — PROGRESS NOTES
Infectious Disease Progress Note           Subjective:   Pt seen and examined at bedside.  Stable, no acute events since last seen, s/p peg tube placement, yet to be started on tube feeds   Objective:   Physical Exam:     Visit Vitals  BP (!) 108/55   Pulse 87   Temp 97.2 °F (36.2 °C)   Resp 22   Ht 5' (1.524 m)   Wt 130 lb (59 kg)   SpO2 95%   BMI 25.39 kg/m²    O2 Flow Rate (L/min): 2 l/min O2 Device: None (Room air)    Temp (24hrs), Av.6 °F (37 °C), Min:97.2 °F (36.2 °C), Max:99.5 °F (37.5 °C)    701 - 1900  In: 200 [I.V.:200]  Out: -    1901 -  07  In: 240   Out: 1001 [Urine:1000]    General: NAD, alert, non-verbal   HEENT: ALEJO, Moist mucosa, decreased left sclera erythema, NGT in place   Lungs: CTA b/l, decreased at the bases, no wheeze/rhonchi   Heart: S1S2+, RRR, no murmur  Abdo: Soft, NT, ND, +BS, +Peg tube   Exts: Contracted exts, some leg swelling   Skin: No wounds, No rashes or lesions    Data Review:       Recent Days:  Recent Labs     21  0921  0614 21  0550   WBC 8.4 7.1 7.8   HGB 11.0* 10.7* 11.3*   HCT 34.8* 34.4* 36.0*    335 354     Recent Labs     21  0926 21  0614 21  0550   BUN 4* 4* 3*   CREA 0.62* 0.66* 0.70       Lab Results   Component Value Date/Time    C-Reactive protein 6.44 (H) 2021 05:00 AM        Microbiology     Results     Procedure Component Value Units Date/Time    MRSA SCREEN - PCR (NASAL) [[id number]] Collected: 21 1100    Order Status: Canceled Specimen: Swab     CULTURE, BLOOD [[id number]] Collected: 21 1100    Order Status: Completed Specimen: Blood Updated: 21 1039     Special Requests: No Special Requests        Culture result: No growth 6 days       CULTURE, SPUTUM/BRONCH/OTH [[id number]] Collected: 21 2115    Order Status: Canceled Specimen: Sputum     COVID-19 RAPID TEST [[id number]] Collected: 21 1620    Order Status: Completed Specimen: Nasopharyngeal Updated: 12/17/21 7099     Specimen source Nasopharyngeal        COVID-19 rapid test Not Detected        Comment: Rapid Abbott ID Now   Rapid NAAT:  The specimen is NEGATIVE for SARS-CoV-2, the novel coronavirus associated with COVID-19. Negative results should be treated as presumptive and, if inconsistent with clinical signs and symptoms or necessary for patient management, should be tested with an alternative molecular assay. Negative results do not preclude SARS-CoV-2 infection and should not be used as the sole basis for patient management decisions. This test has been authorized by the FDA under   an Emergency Use Authorization (EUA) for use by authorized laboratories. Fact sheet for Healthcare Providers: ConventionTestindate.co.nz Fact sheet for Patients: Descomplicadate.co.nz   Methodology: Isothermal Nucleic Acid Amplification             Diagnostics   CXR Results  (Last 48 hours)    None             Assessment/Plan     1. Sepsis on admission due to suspected  aspiration PNA, COVID Neg       Remains on RA, HOB at 30 degrees, no cough       Afebrile, WBC WNLs. S/p 10 days of Zosyn, will d/c and monitor off antibiotics       Routine labs in the morning     2. Dysphagia/poor oral intake: S/p peg tube placement today (12/27)       3. H/o seizure d/o, Controlled on anti-epileptics    4. Acute left leg DVT, Eliquis to be resumed after peg tube placement     5.  Left sclera erythema: Continue symptomatic t     Nnamdi Lorenzo MD    12/27/2021

## 2021-12-27 NOTE — PROGRESS NOTES
Spoke with patient mother about discharge planning. Patient apparently can not return to back to [address]. Spoke to her about discharge planning. Patient has a  in [facility]. Left message for her to get in contact with me. Per mother they have looked at a group home on [place] called [facility] and was told they do have opening. Will try to get in touch and see what we need to do to get him to appropriate level of care.  Mother prefers Skilled nursing group homes and wants to avoid SNF/NH at all cost.    Mother Kodi Alvares ([phone])

## 2021-12-27 NOTE — PROGRESS NOTES
Progress Note    Patient: Felice Gu MRN: [id number]  SSN: [id number]    YOB: 1975  Age: 55 y.o. Sex: male      Admit Date: 12/17/2021    LOS: 10 days     Subjective:   GI in consultation for peg tube placement    Patient seen status post peg tube placement today. He is alert. Peg tube with abdominal dressing dry and intact. Nutritionist consult for peg tube feeding recommendations. He may start back on his Eliquis in 12 hours. May use peg tube in 6 hours post insertion. History of Present Illness: Ang Marks is a 55 y. o. male who is seen in consultation for peg tube placement. Mr. Howard Toussaint has a history of intellectual disability and medical history obtained from patient's mother Damaris Perry and medical records. He does reside in a [address] [place] [address] and has been in the current home for less than a week per patient's mother. He is bedridden requiring total care. He has been on a soft diet prior to admission. Batsheva Stern was sent to the ED with complaints of vomiting and lethargy. He has had poor oral intake and reports of no bowel movements even after having an enema. The staff report he did vomit multiple times. His baseline function is a few word and able to follow simple commands. He does have bilateral contracted hands. While in the ED he was found to have increased temperature, hypertension, and tachycardia meeting sepsis criteria.  His chest x-ray shows suspected aspiration pneumonia. He is on IV zosyn and followed by infectious Disease.  CT of the abdomen and pelvis shows nonspecific abnormalities but no significant intra-abdominal pathology. Batsheva Stern has a past medical history significant for Intellectual disability, seizure disorder, DVT in the past and was on xarelto 10 mg preventative.  He was diagnosed on admission with acute thrombus present in the left external iliac vein, left common femoral vein, and left proximal femoral vein.  CTA of chest shows extensive bilateral reticulonodular infiltrated throughout both lungs. No evidence of Pulmonary Embolus. Spoke at length with patient's mother Cathy Hill and discussed peg tube placement. All questions answered at this time. Patient's mother is in agreement with the peg tube. Discussed patient is currently on the Eliquis for a new diagnosis of DVT in the left leg will need to consult hematology for possible heparin drip prior to peg tube placement. Plan for possible placement on Monday based on hematology's recommendations.      CTA chest 12/21/2021: IMPRESSION  1. Extensive bilateral reticulonodular infiltrates throughout both lungs similar  to the previous study although both lungs were not included in their entirety on the previous study. Findings may represent infectious, inflammatory or  neoplastic process. Recommend follow-up to. No evidence of pulmonary embolus, aortic aneurysm or aortic dissection. 3. Borderline enlarged gastrohepatic lymph node. 4. Thickened esophagus, recommend clinical evaluation        Duplex lower extremities 12/20/2021:   · No evidence of acute deep vein thrombosis in the right common femoral, femoral, popliteal, posterior tibial, and peroneal veins. The veins were imaged in the transverse and longitudinal planes. The vessels showed normal color filling and compressibility. Doppler interrogation showed phasic and spontaneous flow. · Acute thrombus present in the left external iliac vein. · Acute thrombus present in the left common femoral vein. · Acute thrombus present in the left proximal femoral vein.     CT abdomen/pelvis 12/17/2021:   IMPRESSION     1. Small focus of intraluminal gas within the urinary bladder. Please correlate for recent instrumentation or other causes of intraluminal gas, including an infectious process. 2. Somewhat reticular nodular opacities in the lung bases, suboptimally  evaluated due to respiratory motion.  Some of these have what appears to be a tree and bud morphology, suggesting either chronic aspiration or an infectious or inflammatory small airways disease. Otherwise a fibrotic process should be considered. This would be better characterized with dedicated chest CT as clinically warranted. 3. Underdistention versus circumferential wall thickening of the distal  Esophagus.  4. Probable left trochanteric bursitis with sterility of the fluid  Indeterminate. 5. Probable myositis ossificans surrounding the left hip. Please correlate for history of prior trauma. 6. Enlarged lymph nodes in the upper abdomen, nonspecific. Comparison withoutside imaging would be helpful, if available, to determine longer term stability. Otherwise short interval follow-up in 3 months is recommended.        Belgica Wiseman (mother) [phone]  Nelli Beverly (father) [phone]      Objective:     Vitals:    12/27/21 1135 12/27/21 1140 12/27/21 1145 12/27/21 1155   BP: (!) 106/58 (!) 102/55 (!) 113/55 (!) 108/55   Pulse: 83 89 91 87   Resp: 23 22 22 22   Temp:       SpO2: 99% 96% 95% 95%   Weight:       Height:            Intake and Output:  Current Shift: 12/27 0701 - 12/27 1900  In: 200 [I.V.:200]  Out: -   Last three shifts: 12/25 1901 - 12/27 0700  In: 240   Out: 1001 [Urine:1000]    Physical Exam:   Skin:  Extremities and face reveal no rashes. No mark erythema. HEENT: Sclerae anicteric. Extra-occular muscles are intact. No abnormal pigmentation of the lips. The neck is supple. Cardiovascular: Regular rate and rhythm. Respiratory:  Comfortable breathing with no accessory muscle use. GI:  Abdomen nondistended, soft, and nontender. No enlargement of the liver or spleen. No masses palpable.  Abdominal dressing dry/intact  Rectal:  Deferred  Musculoskeletal: Weak, contracted bilateral hands  Neurological:  Intellectual Disability  Psychiatric:  Alert, non-verbal  Lymphatic:  No visible adenopathy      Lab/Data Review:  Recent Results (from the past 24 hour(s))   CBC WITH AUTOMATED DIFF    Collection Time: 12/27/21  9:26 AM   Result Value Ref Range    WBC 8.4 4.1 - 11.1 K/uL    RBC 3.60 (L) 4.10 - 5.70 M/uL    HGB 11.0 (L) 12.1 - 17.0 g/dL    HCT 34.8 (L) 36.6 - 50.3 %    MCV 96.7 80.0 - 99.0 FL    MCH 30.6 26.0 - 34.0 PG    MCHC 31.6 30.0 - 36.5 g/dL    RDW 13.9 11.5 - 14.5 %    PLATELET 164 615 - 501 K/uL    MPV 10.1 8.9 - 12.9 FL    NRBC 0.0 0.0  WBC    ABSOLUTE NRBC 0.00 0.00 - 0.01 K/uL    NEUTROPHILS 67 32 - 75 %    LYMPHOCYTES 19 12 - 49 %    MONOCYTES 7 5 - 13 %    EOSINOPHILS 6 0 - 7 %    BASOPHILS 1 0 - 1 %    IMMATURE GRANULOCYTES 0 0 - 0.5 %    ABS. NEUTROPHILS 5.6 1.8 - 8.0 K/UL    ABS. LYMPHOCYTES 1.6 0.8 - 3.5 K/UL    ABS. MONOCYTES 0.6 0.0 - 1.0 K/UL    ABS. EOSINOPHILS 0.5 (H) 0.0 - 0.4 K/UL    ABS. BASOPHILS 0.0 0.0 - 0.1 K/UL    ABS. IMM. GRANS. 0.0 0.00 - 0.04 K/UL    DF AUTOMATED     METABOLIC PANEL, BASIC    Collection Time: 12/27/21  9:26 AM   Result Value Ref Range    Sodium 140 136 - 145 mmol/L    Potassium 3.9 3.5 - 5.1 mmol/L    Chloride 107 97 - 108 mmol/L    CO2 26 21 - 32 mmol/L    Anion gap 7 5 - 15 mmol/L    Glucose 90 65 - 100 mg/dL    BUN 4 (L) 6 - 20 mg/dL    Creatinine 0.62 (L) 0.70 - 1.30 mg/dL    BUN/Creatinine ratio 6 (L) 12 - 20      GFR est AA >60 >60 ml/min/1.73m2    GFR est non-AA >60 >60 ml/min/1.73m2    Calcium 8.8 8.5 - 10.1 mg/dL   PTT    Collection Time: 12/27/21  9:26 AM   Result Value Ref Range    aPTT 29.7 21.2 - 34.1 sec    aPTT, therapeutic range   82 - 109 sec              XR CHEST PORT   Final Result   1. Nasogastric tube tip overlies the right upper quadrant, likely the gastric   antrum or duodenal bulb. 2. Persistent reticulonodular opacities in both lungs. CTA CHEST W OR W WO CONT   Final Result   1. Extensive bilateral reticulonodular infiltrates throughout both lungs similar   to the previous study although both lungs were not included in their entirety on   the previous study.  Findings may represent infectious, inflammatory or   neoplastic process. Recommend follow-up to. No evidence of pulmonary embolus,   aortic aneurysm or aortic dissection. 3. Borderline enlarged gastrohepatic lymph node. 4. Thickened esophagus, recommend clinical evaluation. DUPLEX LOWER EXT VENOUS BILAT   Final Result      XR CHEST PORT   Final Result   No significant interval change. CT ABD PELV W CONT   Final Result      1. Small focus of intraluminal gas within the urinary bladder. Please correlate   for recent instrumentation or other causes of intraluminal gas, including an   infectious process. 2. Somewhat reticular nodular opacities in the lung bases, suboptimally   evaluated due to respiratory motion. Some of these have what appears to be a   tree and bud morphology, suggesting either chronic aspiration or an infectious   or inflammatory small airways disease. Otherwise a fibrotic process should be   considered. This would be better characterized with dedicated chest CT as   clinically warranted. 3. Underdistention versus circumferential wall thickening of the distal   esophagus. 4. Probable left trochanteric bursitis with sterility of the fluid   indeterminate. 5. Probable myositis ossificans surrounding the left hip. Please correlate for   history of prior trauma. 6. Enlarged lymph nodes in the upper abdomen, nonspecific. Comparison with   outside imaging would be helpful, if available, to determine longer term   stability. Otherwise short interval follow-up in 3 months is recommended. XR CHEST SNGL V   Final Result   Patchy opacities within the lungs, nonspecific, but leading differential   considerations include infection or pulmonary edema. Superimposed acute disease   on an underlying chronic interstitial process is also possible. Radiographic   follow-up is recommended to exclude other causes of opacification.       XR ABD (KUB)   Final Result   Mildly prominent gas-filled loops of bowel within the central abdomen with   overall nonobstructive bowel gas pattern. Assessment:     Active Problems:    Sepsis ([address] (12/17/2021)      Aspiration pneumonia ([address] (12/17/2021)      Respiratory failure with hypoxia ([address] (12/21/2021)      DVT (deep venous thrombosis) ([address] (12/23/2021)        Plan:   1. Dysphagia       S/P  peg tube placement Monday 12/27/21      Feeding per nutritionist recommendation      May restart Eliquis 12 hours post peg placement      Hematology input appreciated      May use PEG tube today in 6 hours. Bolster mild compression to skin wall,       clean wound wth peroxide and water       apply tripple antibiotic cream twice a day  2. Sepsis 2/2 aspiration pneumonia      Infectious Disease input appreciated      Continue IV zosyn      Currently afebrile        Currently on room air  3. Seizure disorder      Neurology input appreciated      Continue home medications  4. Acute Left leg DVT     Eliquis on hold bridging with heparin 2/2 to peg tube placement     May restart Eliquis 12 hours post peg placement per hematology recommendation. Hematology input appreciated    Thank you for allowing me to participate in this patients care. Plan discussed with Dr. Fatoumata Hylton and he approves.     Signed By: Rylan Lopez NP     December 27, 2021

## 2021-12-27 NOTE — PROGRESS NOTES
Hospitalist Progress Note    Subjective:   Daily Progress Note: 12/27/2021 4:21 PM    Hospital Course: The patient is a 59-year-old male with a PMH significant for intellectual disabilities, seizure disorder, quadriplegia. Lives in a group home. Caregivers state he had not been eating or drinking for 4 days or having bowel movements. Unsuccessful enema. Then he started vomiting several times. Brought to the emergency room he was hypertensive, tachycardic and altered from baseline. Chest x-ray consistent with aspiration pneumonia. He presents tachycardic and febrile with hypertension. Admitted for further management of sepsis, aspiration pneumonia, respiratory failure with hypoxia, breakthrough seizure activity. Blood pressure decreasing required ICU admission and septic shock and started on Levophed. Started on vancomycin and Zosyn for aspiration pneumonia. Required nasal cannula oxygen for acute hypoxic respiratory failure. Weaned off pressor therapy and transferred to medical floor for further management. Left leg Doppler study positive for left external iliac vein thrombosis, left common femoral vein thrombosis and left proximal femoral vein thrombosis. He was started on Eliquis. Decreased antibiotic to monotherapy IV Zosyn. Started on Ketotifen and prednisone eyedrop for left sclera edema. Due to high risk of aspiration secondary to chronic disabilities and anatomical swallowing weakness, inability to maintain hydration and nutrition status he would benefit from feeding tube placement. GI has been consulted, plan for PEG tube placement Monday. Heparin drip for bridge therapy while holding Eliquis. NG tube placement for feeding tubes until PEG tube can be placed. Subjective:   Mother concerned that group home wont take her back  He had PEG placed today  Resumed PEG feed    Current Facility-Administered Medications   Medication Dose Route Frequency    0.9% sodium chloride infusion  75 mL/hr IntraVENous CONTINUOUS    sodium chloride (NS) flush 5-40 mL  5-40 mL IntraVENous Q8H    sodium chloride (NS) flush 5-40 mL  5-40 mL IntraVENous PRN    [START ON 12/28/2021] ceFAZolin (ANCEF) 2 g in sterile water (preservative free) 20 mL IV syringe  2 g IntraVENous ONCE    midodrine (PROAMATINE) tablet 5 mg  5 mg Oral TID WITH MEALS    prednisoLONE acetate (PRED FORTE) 1 % ophthalmic suspension 1 Drop  1 Drop Left Eye BID    heparin 25,000 units in D5W 250 ml infusion  12-25 Units/kg/hr IntraVENous TITRATE    heparin (porcine) 1,000 unit/mL injection 3,540 Units  60 Units/kg IntraVENous PRN    Or    heparin (porcine) 1,000 unit/mL injection 1,770 Units  30 Units/kg IntraVENous PRN    [Held by provider] apixaban (ELIQUIS) tablet 5 mg  5 mg Oral BID    0.9% sodium chloride infusion  75 mL/hr IntraVENous CONTINUOUS    levETIRAcetam in saline (iso-os) (KEPPRA) infusion 1,000 mg  1,000 mg IntraVENous BID    acetaminophen (TYLENOL) suppository 650 mg  650 mg Rectal Q4H PRN    ondansetron (ZOFRAN) injection 4 mg  4 mg IntraVENous Q6H PRN    phenytoin (DILANTIN) 100 mg/4 mL oral suspension 100 mg  100 mg Oral DAILY    phenytoin (DILANTIN) 100 mg/4 mL oral suspension 130 mg  130 mg Oral QHS    0.9% sodium chloride infusion 1,000 mL  1,000 mL IntraVENous CONTINUOUS    0.9% sodium chloride infusion 1,000 mL  1,000 mL IntraVENous CONTINUOUS    cetirizine (ZYRTEC) tablet 10 mg  10 mg Oral DAILY    folic acid (FOLVITE) tablet 1 mg  1 mg Oral DAILY    ketotifen (ZADITOR) 0.025 % (0.035 %) ophthalmic solution 1 Drop  1 Drop Both Eyes BID    lactulose (CHRONULAC) 10 gram/15 mL solution 15 mL  10 g Oral DAILY    lamoTRIgine (LaMICtal) tablet 300 mg  300 mg Oral BID    psyllium husk-aspartame (METAMUCIL FIBER) packet 1 Packet  1 Packet Oral DAILY    sodium chloride (NS) flush 5-40 mL  5-40 mL IntraVENous PRN    acetaminophen (TYLENOL) tablet 650 mg  650 mg Oral Q4H PRN    albuterol (PROVENTIL HFA, VENTOLIN HFA, PROAIR HFA) inhaler 2 Puff  2 Puff Inhalation Q4H PRN        REVIEW OF SYSTEMS    Review of Systems   Unable to perform ROS: Mental acuity        Objective:     Visit Vitals  BP (!) 108/55   Pulse 87   Temp 97.2 °F (36.2 °C)   Resp 22   Ht 5' (1.524 m)   Wt 59 kg (130 lb)   SpO2 95%   BMI 25.39 kg/m²    O2 Flow Rate (L/min): 2 l/min O2 Device: None (Room air)    Temp (24hrs), Av.6 °F (37 °C), Min:97.2 °F (36.2 °C), Max:99.5 °F (37.5 °C)      701 - 1900  In: 200 [I.V.:200]  Out: -   1901 -  0700  In: 240   Out: 1001 [Urine:1000]    PHYSICAL EXAM:    Physical Exam  Constitutional:       Appearance: He is ill-appearing. HENT:      Nose: Congestion present. Cardiovascular:      Rate and Rhythm: Regular rhythm. Tachycardia present. Pulmonary:      Effort: No respiratory distress. Breath sounds: Rhonchi present. Abdominal:      General: Bowel sounds are normal.   Musculoskeletal:         General: Deformity present. Comments: Contracted, quadriplegic, bedbound   Skin:     Coloration: Skin is pale. Neurological:      Mental Status: Mental status is at baseline. Comments: Aphasic with intellectual disabilities          Data Review    Recent Results (from the past 24 hour(s))   CBC WITH AUTOMATED DIFF    Collection Time: 21  9:26 AM   Result Value Ref Range    WBC 8.4 4.1 - 11.1 K/uL    RBC 3.60 (L) 4.10 - 5.70 M/uL    HGB 11.0 (L) 12.1 - 17.0 g/dL    HCT 34.8 (L) 36.6 - 50.3 %    MCV 96.7 80.0 - 99.0 FL    MCH 30.6 26.0 - 34.0 PG    MCHC 31.6 30.0 - 36.5 g/dL    RDW 13.9 11.5 - 14.5 %    PLATELET 952 443 - 265 K/uL    MPV 10.1 8.9 - 12.9 FL    NRBC 0.0 0.0  WBC    ABSOLUTE NRBC 0.00 0.00 - 0.01 K/uL    NEUTROPHILS 67 32 - 75 %    LYMPHOCYTES 19 12 - 49 %    MONOCYTES 7 5 - 13 %    EOSINOPHILS 6 0 - 7 %    BASOPHILS 1 0 - 1 %    IMMATURE GRANULOCYTES 0 0 - 0.5 %    ABS. NEUTROPHILS 5.6 1.8 - 8.0 K/UL    ABS. LYMPHOCYTES 1.6 0.8 - 3.5 K/UL    ABS. MONOCYTES 0.6 0.0 - 1.0 K/UL    ABS. EOSINOPHILS 0.5 (H) 0.0 - 0.4 K/UL    ABS. BASOPHILS 0.0 0.0 - 0.1 K/UL    ABS. IMM. GRANS. 0.0 0.00 - 0.04 K/UL    DF AUTOMATED     METABOLIC PANEL, BASIC    Collection Time: 12/27/21  9:26 AM   Result Value Ref Range    Sodium 140 136 - 145 mmol/L    Potassium 3.9 3.5 - 5.1 mmol/L    Chloride 107 97 - 108 mmol/L    CO2 26 21 - 32 mmol/L    Anion gap 7 5 - 15 mmol/L    Glucose 90 65 - 100 mg/dL    BUN 4 (L) 6 - 20 mg/dL    Creatinine 0.62 (L) 0.70 - 1.30 mg/dL    BUN/Creatinine ratio 6 (L) 12 - 20      GFR est AA >60 >60 ml/min/1.73m2    GFR est non-AA >60 >60 ml/min/1.73m2    Calcium 8.8 8.5 - 10.1 mg/dL   PTT    Collection Time: 12/27/21  9:26 AM   Result Value Ref Range    aPTT 29.7 21.2 - 34.1 sec    aPTT, therapeutic range   82 - 109 sec       XR CHEST PORT   Final Result   1. Nasogastric tube tip overlies the right upper quadrant, likely the gastric   antrum or duodenal bulb. 2. Persistent reticulonodular opacities in both lungs. CTA CHEST W OR W WO CONT   Final Result   1. Extensive bilateral reticulonodular infiltrates throughout both lungs similar   to the previous study although both lungs were not included in their entirety on   the previous study. Findings may represent infectious, inflammatory or   neoplastic process. Recommend follow-up to. No evidence of pulmonary embolus,   aortic aneurysm or aortic dissection. 3. Borderline enlarged gastrohepatic lymph node. 4. Thickened esophagus, recommend clinical evaluation. DUPLEX LOWER EXT VENOUS BILAT   Final Result      XR CHEST PORT   Final Result   No significant interval change. CT ABD PELV W CONT   Final Result      1. Small focus of intraluminal gas within the urinary bladder. Please correlate   for recent instrumentation or other causes of intraluminal gas, including an   infectious process.    2. Somewhat reticular nodular opacities in the lung bases, suboptimally   evaluated due to respiratory motion. Some of these have what appears to be a   tree and bud morphology, suggesting either chronic aspiration or an infectious   or inflammatory small airways disease. Otherwise a fibrotic process should be   considered. This would be better characterized with dedicated chest CT as   clinically warranted. 3. Underdistention versus circumferential wall thickening of the distal   esophagus. 4. Probable left trochanteric bursitis with sterility of the fluid   indeterminate. 5. Probable myositis ossificans surrounding the left hip. Please correlate for   history of prior trauma. 6. Enlarged lymph nodes in the upper abdomen, nonspecific. Comparison with   outside imaging would be helpful, if available, to determine longer term   stability. Otherwise short interval follow-up in 3 months is recommended. XR CHEST SNGL V   Final Result   Patchy opacities within the lungs, nonspecific, but leading differential   considerations include infection or pulmonary edema. Superimposed acute disease   on an underlying chronic interstitial process is also possible. Radiographic   follow-up is recommended to exclude other causes of opacification. XR ABD (KUB)   Final Result   Mildly prominent gas-filled loops of bowel within the central abdomen with   overall nonobstructive bowel gas pattern.           Active Problems:    Sepsis ([address] (12/17/2021)      Aspiration pneumonia ([address] (12/17/2021)      Respiratory failure with hypoxia ([address] (12/21/2021)      DVT (deep venous thrombosis) ([address] (12/23/2021)        Assessment/Plan:       Respiratory failure with hypoxia  Aspiration pneumonia  Continue oxygen as needed to maintain saturations greater than 92%: Currently on 3 L  Continues on IV Zosyn day # 7/10  ID following  Speech evaluation pending recommending PEG placement  GI consulted for feeding tube placement due to high risk aspiration and inability to meet nutrition and hydration needs scheduled for Monday  NG tube placement to start feeding until PEG tube is placed    Septic shockresolved  Off pressors    Seizure disorder  Continue home medication at same dose most likely subtherapeutic levels caused from med med interaction  Continue Keppra infusion 1000 mg twice daily  Dilantin 100 mg daily, 130 mg at bedtime  Neurology consulted    Intellectual disabilities  Quadriplegia  Plan return to group facility when stable    DVTs  Doppler study positive for left external iliac vein, left common femoral vein and left proximal femoral vein thrombosis  Per oncology hematology consult will hold Eliquis for pending PEG tube placement and start heparin bridge therapy. Eliquis can be restarted 12 hours after PEG tube placement  Heparin low-dose drip    Hypotension  Started on midodrine twice daily 5 mg    Hypokalemia  Replete and monitor    DVT Prophylaxis: Eliquis  Code Status: Full Code  · POA/NOK:    Mother concerned that group home wont take her back  He had PEG placed today  Resumed PEG feed    will consult case management______________________________________________________________________________    Susan Pringle MD    This is dictation was done by dragon, computer voice recognition software. Quite often unanticipated grammatical, syntax, homophones and other interpretive errors or inadvertently transcribed by the computer software. Please excuse errors that have escaped final proofreading. Thank you.

## 2021-12-27 NOTE — PROGRESS NOTES
CM has reviewed chart. Patient having PEG placed today. Once medically stable and tolerating Tube feed plan will be to return back to [facility] home.     DC plan back to [address]

## 2021-12-28 LAB
ANION GAP SERPL CALC-SCNC: 6 MMOL/L (ref 5–15)
APTT PPP: 28 SEC (ref 21.2–34.1)
APTT PPP: 33.2 SEC (ref 21.2–34.1)
BASOPHILS # BLD: 0 K/UL (ref 0–0.1)
BASOPHILS NFR BLD: 0 % (ref 0–1)
BUN SERPL-MCNC: 5 MG/DL (ref 6–20)
BUN/CREAT SERPL: 8 (ref 12–20)
CA-I BLD-MCNC: 8.8 MG/DL (ref 8.5–10.1)
CHLORIDE SERPL-SCNC: 106 MMOL/L (ref 97–108)
CO2 SERPL-SCNC: 26 MMOL/L (ref 21–32)
CREAT SERPL-MCNC: 0.6 MG/DL (ref 0.7–1.3)
DIFFERENTIAL METHOD BLD: ABNORMAL
EOSINOPHIL # BLD: 0.5 K/UL (ref 0–0.4)
EOSINOPHIL NFR BLD: 5 % (ref 0–7)
ERYTHROCYTE [DISTWIDTH] IN BLOOD BY AUTOMATED COUNT: 13.9 % (ref 11.5–14.5)
GLUCOSE SERPL-MCNC: 125 MG/DL (ref 65–100)
HCT VFR BLD AUTO: 34.3 % (ref 36.6–50.3)
HGB BLD-MCNC: 10.6 G/DL (ref 12.1–17)
IMM GRANULOCYTES # BLD AUTO: 0 K/UL (ref 0–0.04)
IMM GRANULOCYTES NFR BLD AUTO: 0 % (ref 0–0.5)
LYMPHOCYTES # BLD: 1.8 K/UL (ref 0.8–3.5)
LYMPHOCYTES NFR BLD: 20 % (ref 12–49)
MCH RBC QN AUTO: 30 PG (ref 26–34)
MCHC RBC AUTO-ENTMCNC: 30.9 G/DL (ref 30–36.5)
MCV RBC AUTO: 97.2 FL (ref 80–99)
MONOCYTES # BLD: 0.6 K/UL (ref 0–1)
MONOCYTES NFR BLD: 7 % (ref 5–13)
NEUTS SEG # BLD: 6.1 K/UL (ref 1.8–8)
NEUTS SEG NFR BLD: 68 % (ref 32–75)
NRBC # BLD: 0 K/UL (ref 0–0.01)
NRBC BLD-RTO: 0 PER 100 WBC
PLATELET # BLD AUTO: 315 K/UL (ref 150–400)
PMV BLD AUTO: 10.3 FL (ref 8.9–12.9)
POTASSIUM SERPL-SCNC: 3.8 MMOL/L (ref 3.5–5.1)
RBC # BLD AUTO: 3.53 M/UL (ref 4.1–5.7)
SODIUM SERPL-SCNC: 138 MMOL/L (ref 136–145)
THERAPEUTIC RANGE,PTTT: NORMAL SEC (ref 82–109)
THERAPEUTIC RANGE,PTTT: NORMAL SEC (ref 82–109)
WBC # BLD AUTO: 9 K/UL (ref 4.1–11.1)

## 2021-12-28 PROCEDURE — 74011250636 HC RX REV CODE- 250/636: Performed by: NURSE PRACTITIONER

## 2021-12-28 PROCEDURE — 85730 THROMBOPLASTIN TIME PARTIAL: CPT

## 2021-12-28 PROCEDURE — 80048 BASIC METABOLIC PNL TOTAL CA: CPT

## 2021-12-28 PROCEDURE — 99232 SBSQ HOSP IP/OBS MODERATE 35: CPT | Performed by: INTERNAL MEDICINE

## 2021-12-28 PROCEDURE — 36415 COLL VENOUS BLD VENIPUNCTURE: CPT

## 2021-12-28 PROCEDURE — 74011250637 HC RX REV CODE- 250/637: Performed by: NURSE PRACTITIONER

## 2021-12-28 PROCEDURE — 74011250636 HC RX REV CODE- 250/636: Performed by: HOSPITALIST

## 2021-12-28 PROCEDURE — 77010033678 HC OXYGEN DAILY

## 2021-12-28 PROCEDURE — 74011250637 HC RX REV CODE- 250/637: Performed by: HOSPITALIST

## 2021-12-28 PROCEDURE — 65270000029 HC RM PRIVATE

## 2021-12-28 PROCEDURE — 94760 N-INVAS EAR/PLS OXIMETRY 1: CPT

## 2021-12-28 PROCEDURE — 85025 COMPLETE CBC W/AUTO DIFF WBC: CPT

## 2021-12-28 RX ORDER — SODIUM CHLORIDE 0.9 % (FLUSH) 0.9 %
5-10 SYRINGE (ML) INJECTION AS NEEDED
Status: DISCONTINUED | OUTPATIENT
Start: 2021-12-28 | End: 2022-01-01

## 2021-12-28 RX ADMIN — LAMOTRIGINE 300 MG: 100 TABLET ORAL at 10:17

## 2021-12-28 RX ADMIN — ACETAMINOPHEN 650 MG: 650 SUPPOSITORY RECTAL at 20:38

## 2021-12-28 RX ADMIN — SODIUM CHLORIDE 75 ML/HR: 9 INJECTION, SOLUTION INTRAVENOUS at 10:11

## 2021-12-28 RX ADMIN — PREDNISOLONE ACETATE 1 DROP: 10 SUSPENSION/ DROPS OPHTHALMIC at 10:16

## 2021-12-28 RX ADMIN — MIDODRINE HYDROCHLORIDE 5 MG: 5 TABLET ORAL at 17:29

## 2021-12-28 RX ADMIN — CETIRIZINE HYDROCHLORIDE 10 MG: 10 TABLET, FILM COATED ORAL at 10:17

## 2021-12-28 RX ADMIN — PHENYTOIN 130 MG: 125 SUSPENSION ORAL at 20:38

## 2021-12-28 RX ADMIN — LAMOTRIGINE 300 MG: 100 TABLET ORAL at 20:38

## 2021-12-28 RX ADMIN — KETOTIFEN FUMARATE 1 DROP: 0.35 SOLUTION/ DROPS OPHTHALMIC at 20:38

## 2021-12-28 RX ADMIN — HEPARIN SODIUM AND DEXTROSE 12 UNITS/KG/HR: 10000; 5 INJECTION INTRAVENOUS at 02:32

## 2021-12-28 RX ADMIN — KETOTIFEN FUMARATE 1 DROP: 0.35 SOLUTION/ DROPS OPHTHALMIC at 10:16

## 2021-12-28 RX ADMIN — PHENYTOIN 100 MG: 125 SUSPENSION ORAL at 10:16

## 2021-12-28 RX ADMIN — APIXABAN 5 MG: 5 TABLET, FILM COATED ORAL at 20:38

## 2021-12-28 RX ADMIN — FOLIC ACID 1 MG: 1 TABLET ORAL at 10:17

## 2021-12-28 RX ADMIN — LEVETIRACETAM 1000 MG: 10 INJECTION, SOLUTION INTRAVENOUS at 10:16

## 2021-12-28 RX ADMIN — MIDODRINE HYDROCHLORIDE 5 MG: 5 TABLET ORAL at 10:17

## 2021-12-28 RX ADMIN — LACTULOSE 15 ML: 10 SOLUTION ORAL at 10:16

## 2021-12-28 RX ADMIN — LEVETIRACETAM 1000 MG: 10 INJECTION, SOLUTION INTRAVENOUS at 20:38

## 2021-12-28 RX ADMIN — MIDODRINE HYDROCHLORIDE 5 MG: 5 TABLET ORAL at 12:00

## 2021-12-28 RX ADMIN — PSYLLIUM HUSK 1 PACKET: 3.4 POWDER ORAL at 10:16

## 2021-12-28 RX ADMIN — PREDNISOLONE ACETATE 1 DROP: 10 SUSPENSION/ DROPS OPHTHALMIC at 20:38

## 2021-12-28 NOTE — PROGRESS NOTES
Progress Note    Patient: Curtis Huynh MRN: [id number]  SSN: [id number]    YOB: 1975  Age: 55 y.o. Sex: male      Admit Date: 12/17/2021    LOS: 11 days     Subjective:   GI in consultation for peg tube placement    Per nursing patient is tolerating peg tube feeding without difficulty. His Eliquis has been re-started. His mother is at the bedside. He is alert but non-verbal. Abdomen is soft. Peg tube site without redness or drainage. History of Present Illness: Ang Marks is a 55 y. o. male who is seen in consultation for peg tube placement. Mr. Alhaji Farrell has a history of intellectual disability and medical history obtained from patient's mother Lindajo Schirmer and medical records. He does reside in a [address] [place] [address] and has been in the current home for less than a week per patient's mother. He is bedridden requiring total care. He has been on a soft diet prior to admission. [facility] was sent to the ED with complaints of vomiting and lethargy. He has had poor oral intake and reports of no bowel movements even after having an enema. The staff report he did vomit multiple times. His baseline function is a few word and able to follow simple commands. He does have bilateral contracted hands. While in the ED he was found to have increased temperature, hypertension, and tachycardia meeting sepsis criteria.  His chest x-ray shows suspected aspiration pneumonia. He is on IV zosyn and followed by infectious Disease.  CT of the abdomen and pelvis shows nonspecific abnormalities but no significant intra-abdominal pathology. [facility] has a past medical history significant for Intellectual disability, seizure disorder, DVT in the past and was on xarelto 10 mg preventative. He was diagnosed on admission with acute thrombus present in the left external iliac vein, left common femoral vein, and left proximal femoral vein.  CTA of chest shows extensive bilateral reticulonodular infiltrated throughout both lungs.  No evidence of Pulmonary Embolus. Spoke at length with patient's mother Bam Henderson and discussed peg tube placement. All questions answered at this time. Patient's mother is in agreement with the peg tube. Discussed patient is currently on the Eliquis for a new diagnosis of DVT in the left leg will need to consult hematology for possible heparin drip prior to peg tube placement. Plan for possible placement on Monday based on hematology's recommendations.      CTA chest 12/21/2021: IMPRESSION  1. Extensive bilateral reticulonodular infiltrates throughout both lungs similar  to the previous study although both lungs were not included in their entirety on the previous study. Findings may represent infectious, inflammatory or  neoplastic process. Recommend follow-up to. No evidence of pulmonary embolus, aortic aneurysm or aortic dissection. 3. Borderline enlarged gastrohepatic lymph node. 4. Thickened esophagus, recommend clinical evaluation        Duplex lower extremities 12/20/2021:   · No evidence of acute deep vein thrombosis in the right common femoral, femoral, popliteal, posterior tibial, and peroneal veins. The veins were imaged in the transverse and longitudinal planes. The vessels showed normal color filling and compressibility. Doppler interrogation showed phasic and spontaneous flow. · Acute thrombus present in the left external iliac vein. · Acute thrombus present in the left common femoral vein. · Acute thrombus present in the left proximal femoral vein.     CT abdomen/pelvis 12/17/2021:   IMPRESSION     1. Small focus of intraluminal gas within the urinary bladder. Please correlate for recent instrumentation or other causes of intraluminal gas, including an infectious process. 2. Somewhat reticular nodular opacities in the lung bases, suboptimally  evaluated due to respiratory motion.  Some of these have what appears to be a tree and bud morphology, suggesting either chronic aspiration or an infectious or inflammatory small airways disease. Otherwise a fibrotic process should be considered. This would be better characterized with dedicated chest CT as clinically warranted. 3. Underdistention versus circumferential wall thickening of the distal  Esophagus.  4. Probable left trochanteric bursitis with sterility of the fluid  Indeterminate. 5. Probable myositis ossificans surrounding the left hip. Please correlate for history of prior trauma. 6. Enlarged lymph nodes in the upper abdomen, nonspecific. Comparison withoutside imaging would be helpful, if available, to determine longer term stability. Otherwise short interval follow-up in 3 months is recommended.        Roselia Almonte (mother) [phone]  Scarlett Ying (father) [phone]       Objective:     Vitals:    12/27/21 2018 12/27/21 2034 12/28/21 0425 12/28/21 0917   BP: (!) 99/56  (!) 83/55 (!) 89/60   Pulse: (!) 101  97 95   Resp: 19  18 18   Temp: 98.4 °F (36.9 °C)  97.5 °F (36.4 °C) 98.9 °F (37.2 °C)   SpO2: (!) 89% 93% 94% 97%   Weight:       Height:            Intake and Output:  Current Shift: No intake/output data recorded. Last three shifts: 12/26 1901 - 12/28 0700  In: 320 [I.V.:200]  Out: -     Physical Exam:   Skin:  Extremities and face reveal no rashes. No mark erythema. HEENT: Sclerae anicteric. Extra-occular muscles are intact. No abnormal pigmentation of the lips. The neck is supple. Cardiovascular: Regular rate and rhythm. Respiratory:  Comfortable breathing with no accessory muscle use. GI:  Abdomen nondistended, soft, and nontender. No enlargement of the liver or spleen. No masses palpable.   Rectal:  Deferred  Musculoskeletal: weak, contracted bilateral upper extremities  Neurological:  Intellectual Disability  Psychiatric:  Alert, non-verbal  Lymphatic:  No visible adenopathy      Lab/Data Review:  Recent Results (from the past 24 hour(s))   PTT    Collection Time: 12/28/21 12:35 AM   Result Value Ref Range    aPTT 28.0 21.2 - 34.1 sec    aPTT, therapeutic range   82 - 109 sec   CBC WITH AUTOMATED DIFF    Collection Time: 12/28/21  7:38 AM   Result Value Ref Range    WBC 9.0 4.1 - 11.1 K/uL    RBC 3.53 (L) 4.10 - 5.70 M/uL    HGB 10.6 (L) 12.1 - 17.0 g/dL    HCT 34.3 (L) 36.6 - 50.3 %    MCV 97.2 80.0 - 99.0 FL    MCH 30.0 26.0 - 34.0 PG    MCHC 30.9 30.0 - 36.5 g/dL    RDW 13.9 11.5 - 14.5 %    PLATELET 289 837 - 311 K/uL    MPV 10.3 8.9 - 12.9 FL    NRBC 0.0 0.0  WBC    ABSOLUTE NRBC 0.00 0.00 - 0.01 K/uL    NEUTROPHILS 68 32 - 75 %    LYMPHOCYTES 20 12 - 49 %    MONOCYTES 7 5 - 13 %    EOSINOPHILS 5 0 - 7 %    BASOPHILS 0 0 - 1 %    IMMATURE GRANULOCYTES 0 0 - 0.5 %    ABS. NEUTROPHILS 6.1 1.8 - 8.0 K/UL    ABS. LYMPHOCYTES 1.8 0.8 - 3.5 K/UL    ABS. MONOCYTES 0.6 0.0 - 1.0 K/UL    ABS. EOSINOPHILS 0.5 (H) 0.0 - 0.4 K/UL    ABS. BASOPHILS 0.0 0.0 - 0.1 K/UL    ABS. IMM. GRANS. 0.0 0.00 - 0.04 K/UL    DF AUTOMATED     METABOLIC PANEL, BASIC    Collection Time: 12/28/21  7:38 AM   Result Value Ref Range    Sodium 138 136 - 145 mmol/L    Potassium 3.8 3.5 - 5.1 mmol/L    Chloride 106 97 - 108 mmol/L    CO2 26 21 - 32 mmol/L    Anion gap 6 5 - 15 mmol/L    Glucose 125 (H) 65 - 100 mg/dL    BUN 5 (L) 6 - 20 mg/dL    Creatinine 0.60 (L) 0.70 - 1.30 mg/dL    BUN/Creatinine ratio 8 (L) 12 - 20      GFR est AA >60 >60 ml/min/1.73m2    GFR est non-AA >60 >60 ml/min/1.73m2    Calcium 8.8 8.5 - 10.1 mg/dL   PTT    Collection Time: 12/28/21  7:38 AM   Result Value Ref Range    aPTT 33.2 21.2 - 34.1 sec    aPTT, therapeutic range   82 - 109 sec              XR CHEST PORT   Final Result   1. Nasogastric tube tip overlies the right upper quadrant, likely the gastric   antrum or duodenal bulb. 2. Persistent reticulonodular opacities in both lungs. CTA CHEST W OR W WO CONT   Final Result   1.  Extensive bilateral reticulonodular infiltrates throughout both lungs similar   to the previous study although both lungs were not included in their entirety on   the previous study. Findings may represent infectious, inflammatory or   neoplastic process. Recommend follow-up to. No evidence of pulmonary embolus,   aortic aneurysm or aortic dissection. 3. Borderline enlarged gastrohepatic lymph node. 4. Thickened esophagus, recommend clinical evaluation. DUPLEX LOWER EXT VENOUS BILAT   Final Result      XR CHEST PORT   Final Result   No significant interval change. CT ABD PELV W CONT   Final Result      1. Small focus of intraluminal gas within the urinary bladder. Please correlate   for recent instrumentation or other causes of intraluminal gas, including an   infectious process. 2. Somewhat reticular nodular opacities in the lung bases, suboptimally   evaluated due to respiratory motion. Some of these have what appears to be a   tree and bud morphology, suggesting either chronic aspiration or an infectious   or inflammatory small airways disease. Otherwise a fibrotic process should be   considered. This would be better characterized with dedicated chest CT as   clinically warranted. 3. Underdistention versus circumferential wall thickening of the distal   esophagus. 4. Probable left trochanteric bursitis with sterility of the fluid   indeterminate. 5. Probable myositis ossificans surrounding the left hip. Please correlate for   history of prior trauma. 6. Enlarged lymph nodes in the upper abdomen, nonspecific. Comparison with   outside imaging would be helpful, if available, to determine longer term   stability. Otherwise short interval follow-up in 3 months is recommended. XR CHEST SNGL V   Final Result   Patchy opacities within the lungs, nonspecific, but leading differential   considerations include infection or pulmonary edema. Superimposed acute disease   on an underlying chronic interstitial process is also possible. Radiographic   follow-up is recommended to exclude other causes of opacification.       XR ABD (KUB) Final Result   Mildly prominent gas-filled loops of bowel within the central abdomen with   overall nonobstructive bowel gas pattern. Assessment:     Active Problems:    Sepsis ([address] (12/17/2021)      Aspiration pneumonia ([address] (12/17/2021)      Respiratory failure with hypoxia ([address] (12/21/2021)      DVT (deep venous thrombosis) ([address] (12/23/2021)        Plan:   1. Dysphagia       S/P  peg tube placement Monday 12/27/21       nutritionist input appreciated      Hematology input appreciate      Bolster mild compression to skin wall,       clean wound wth peroxide and water       apply tripple antibiotic cream twice a day  2. Sepsis 2/2 aspiration pneumonia      Infectious Disease input appreciated      Continue IV zosyn      Currently afebrile        Currently on room air  3. Seizure disorder      Neurology input appreciated      Continue home medications  4. Acute Left leg DVT     Eliquis re-started      Hematology input appreciated    Thank you for allowing me to participate in this patients care. Plan discussed with Dr. Jenny Grady and he approves.     Signed By: Russ Pike NP     December 28, 2021

## 2021-12-28 NOTE — PROGRESS NOTES
Attempted to see pt for follow-up. Pt is s/p PEG Placement, nsg reports is tolerating PEG TF. Pt positioned upright; however, closes mouth and turns head away when spoon is presented. No further trials attempted. Will cont to follow if pt able to participate. However, recommend SLP intervention after d/c from acute for PO trials for pleasure as indicated and tolerated by pt. Discussed with nsg and CM.

## 2021-12-28 NOTE — PROGRESS NOTES
Infectious Disease Progress Note           Subjective:   Remains stable w no change in clinical status, started on tube feeds, tolerating well, no febrile episodes   Objective:   Physical Exam:     Visit Vitals  BP (!) 89/60 (BP 1 Location: Left upper arm, BP Patient Position: Semi fowlers)   Pulse 95   Temp 98.9 °F (37.2 °C)   Resp 18   Ht 5' (1.524 m)   Wt 130 lb (59 kg)   SpO2 97%   BMI 25.39 kg/m²    O2 Flow Rate (L/min): 2 l/min O2 Device: Nasal cannula    Temp (24hrs), Av °F (36.7 °C), Min:97.2 °F (36.2 °C), Max:98.9 °F (37.2 °C)    No intake/output data recorded.     1901 -  0700  In: 320 [I.V.:200]  Out: -     General: NAD, alert, non-verbal   HEENT: ALEJO, Moist mucosa, decreased left sclera erythema  Lungs: CTA b/l, decreased at the bases, no wheeze/rhonchi   Heart: S1S2+, RRR, no murmur  Abdo: Soft, NT, ND, +BS, +Peg tube   Exts: Contracted exts, some leg swelling   Skin: No wounds, No rashes or lesions    Data Review:       Recent Days:  Recent Labs     21  0926 21  0614   WBC 8.4 7.1   HGB 11.0* 10.7*   HCT 34.8* 34.4*    335     Recent Labs     21  0738 21  0926 21  0614   BUN 5* 4* 4*   CREA 0.60* 0.62* 0.66*       Lab Results   Component Value Date/Time    C-Reactive protein 6.44 (H) 2021 05:00 AM        Microbiology     Results     Procedure Component Value Units Date/Time    MRSA SCREEN - PCR (NASAL) [[id number]] Collected: 21 1100    Order Status: Canceled Specimen: Swab     CULTURE, BLOOD [[id number]] Collected: 21 1100    Order Status: Completed Specimen: Blood Updated: 21 1039     Special Requests: No Special Requests        Culture result: No growth 6 days       CULTURE, SPUTUM/BRONCH/OTH [[id number]] Collected: 21    Order Status: Canceled Specimen: Sputum     COVID-19 RAPID TEST [[id number]] Collected: 21 1620    Order Status: Completed Specimen: Nasopharyngeal Updated: 21 3042 Specimen source Nasopharyngeal        COVID-19 rapid test Not Detected        Comment: Rapid Abbott ID Now   Rapid NAAT:  The specimen is NEGATIVE for SARS-CoV-2, the novel coronavirus associated with COVID-19. Negative results should be treated as presumptive and, if inconsistent with clinical signs and symptoms or necessary for patient management, should be tested with an alternative molecular assay. Negative results do not preclude SARS-CoV-2 infection and should not be used as the sole basis for patient management decisions. This test has been authorized by the FDA under   an Emergency Use Authorization (EUA) for use by authorized laboratories. Fact sheet for Healthcare Providers: [employer] Fact sheet for Patients: [employer]nz   Methodology: Isothermal Nucleic Acid Amplification             Diagnostics   CXR Results  (Last 48 hours)    None             Assessment/Plan     1. Sepsis on admission due to suspected  aspiration PNA, COVID Neg       Currently on supplemental O2 via NC, clear lungs on exam      No febrile episodes since last seen, borderline BP, MAP >65      Completed 10 days of empiric Zosyn, discontinued on 12/27      Will monitor off antibiotics for now, CXR and routine labs in the morning       Ongoing risk factors for aspiration PNA, will continue to keep HOB > 30 degrees     2. Dysphagia/poor oral intake: S/p peg tube placement on 12/27, on tube feeds       3. H/o seizure d/o, Controlled on anti-epileptics    4. Acute left leg DVT, Eliquis to be resumed after peg tube placement     5.  Left sclera erythema: improved but not completely resolved     Claudia Mishra MD    12/28/2021

## 2021-12-29 ENCOUNTER — APPOINTMENT (OUTPATIENT)
Dept: GENERAL RADIOLOGY | Age: 46
DRG: 720 | End: 2021-12-29
Attending: HOSPITALIST
Payer: MEDICAID

## 2021-12-29 LAB
ALBUMIN SERPL-MCNC: 2.4 G/DL (ref 3.5–5)
ALBUMIN/GLOB SERPL: 0.6 {RATIO} (ref 1.1–2.2)
ALP SERPL-CCNC: 207 U/L (ref 45–117)
ALT SERPL-CCNC: 32 U/L (ref 12–78)
ANION GAP SERPL CALC-SCNC: 6 MMOL/L (ref 5–15)
ANION GAP SERPL CALC-SCNC: 7 MMOL/L (ref 5–15)
APPEARANCE UR: CLEAR
AST SERPL W P-5'-P-CCNC: 44 U/L (ref 15–37)
ATRIAL RATE: 98 BPM
BACTERIA URNS QL MICRO: NEGATIVE /HPF
BASOPHILS # BLD: 0 K/UL (ref 0–0.1)
BASOPHILS NFR BLD: 0 % (ref 0–1)
BILIRUB SERPL-MCNC: 0.2 MG/DL (ref 0.2–1)
BILIRUB UR QL: NEGATIVE
BUN SERPL-MCNC: 3 MG/DL (ref 6–20)
BUN SERPL-MCNC: 3 MG/DL (ref 6–20)
BUN/CREAT SERPL: 5 (ref 12–20)
BUN/CREAT SERPL: 5 (ref 12–20)
CA-I BLD-MCNC: 8.4 MG/DL (ref 8.5–10.1)
CA-I BLD-MCNC: 8.6 MG/DL (ref 8.5–10.1)
CALCULATED P AXIS, ECG09: 23 DEGREES
CALCULATED R AXIS, ECG10: 63 DEGREES
CALCULATED T AXIS, ECG11: 27 DEGREES
CHLORIDE SERPL-SCNC: 109 MMOL/L (ref 97–108)
CHLORIDE SERPL-SCNC: 110 MMOL/L (ref 97–108)
CO2 SERPL-SCNC: 24 MMOL/L (ref 21–32)
CO2 SERPL-SCNC: 26 MMOL/L (ref 21–32)
COLOR UR: NORMAL
CREAT SERPL-MCNC: 0.59 MG/DL (ref 0.7–1.3)
CREAT SERPL-MCNC: 0.61 MG/DL (ref 0.7–1.3)
DIAGNOSIS, 93000: NORMAL
DIFFERENTIAL METHOD BLD: ABNORMAL
EOSINOPHIL # BLD: 0.6 K/UL (ref 0–0.4)
EOSINOPHIL NFR BLD: 6 % (ref 0–7)
ERYTHROCYTE [DISTWIDTH] IN BLOOD BY AUTOMATED COUNT: 13.9 % (ref 11.5–14.5)
GLOBULIN SER CALC-MCNC: 4 G/DL (ref 2–4)
GLUCOSE SERPL-MCNC: 102 MG/DL (ref 65–100)
GLUCOSE SERPL-MCNC: 103 MG/DL (ref 65–100)
GLUCOSE UR STRIP.AUTO-MCNC: NEGATIVE MG/DL
HCT VFR BLD AUTO: 33.2 % (ref 36.6–50.3)
HGB BLD-MCNC: 10.3 G/DL (ref 12.1–17)
HGB UR QL STRIP: NEGATIVE
IMM GRANULOCYTES # BLD AUTO: 0 K/UL (ref 0–0.04)
IMM GRANULOCYTES NFR BLD AUTO: 0 % (ref 0–0.5)
KETONES UR QL STRIP.AUTO: NEGATIVE MG/DL
LACTATE SERPL-SCNC: 1 MMOL/L (ref 0.4–2)
LEUKOCYTE ESTERASE UR QL STRIP.AUTO: NEGATIVE
LYMPHOCYTES # BLD: 2 K/UL (ref 0.8–3.5)
LYMPHOCYTES NFR BLD: 21 % (ref 12–49)
MCH RBC QN AUTO: 30.2 PG (ref 26–34)
MCHC RBC AUTO-ENTMCNC: 31 G/DL (ref 30–36.5)
MCV RBC AUTO: 97.4 FL (ref 80–99)
MONOCYTES # BLD: 0.8 K/UL (ref 0–1)
MONOCYTES NFR BLD: 8 % (ref 5–13)
MUCOUS THREADS URNS QL MICRO: NORMAL /LPF
NEUTS SEG # BLD: 6.1 K/UL (ref 1.8–8)
NEUTS SEG NFR BLD: 65 % (ref 32–75)
NITRITE UR QL STRIP.AUTO: NEGATIVE
NRBC # BLD: 0 K/UL (ref 0–0.01)
NRBC BLD-RTO: 0 PER 100 WBC
P-R INTERVAL, ECG05: 150 MS
PH UR STRIP: 7 [PH] (ref 5–8)
PLATELET # BLD AUTO: 288 K/UL (ref 150–400)
PMV BLD AUTO: 9.7 FL (ref 8.9–12.9)
POTASSIUM SERPL-SCNC: 3.6 MMOL/L (ref 3.5–5.1)
POTASSIUM SERPL-SCNC: 3.6 MMOL/L (ref 3.5–5.1)
PROT SERPL-MCNC: 6.4 G/DL (ref 6.4–8.2)
PROT UR STRIP-MCNC: NEGATIVE MG/DL
Q-T INTERVAL, ECG07: 344 MS
QRS DURATION, ECG06: 90 MS
QTC CALCULATION (BEZET), ECG08: 439 MS
RBC # BLD AUTO: 3.41 M/UL (ref 4.1–5.7)
RBC #/AREA URNS HPF: NORMAL /HPF (ref 0–5)
SODIUM SERPL-SCNC: 141 MMOL/L (ref 136–145)
SODIUM SERPL-SCNC: 141 MMOL/L (ref 136–145)
SP GR UR REFRACTOMETRY: 1 (ref 1–1.03)
UROBILINOGEN UR QL STRIP.AUTO: 0.1 EU/DL (ref 0.1–1)
VENTRICULAR RATE, ECG03: 98 BPM
WBC # BLD AUTO: 9.5 K/UL (ref 4.1–11.1)
WBC URNS QL MICRO: NORMAL /HPF (ref 0–4)

## 2021-12-29 PROCEDURE — 83605 ASSAY OF LACTIC ACID: CPT

## 2021-12-29 PROCEDURE — 74011250637 HC RX REV CODE- 250/637: Performed by: HOSPITALIST

## 2021-12-29 PROCEDURE — 74011250637 HC RX REV CODE- 250/637: Performed by: NURSE PRACTITIONER

## 2021-12-29 PROCEDURE — 74011250636 HC RX REV CODE- 250/636: Performed by: HOSPITALIST

## 2021-12-29 PROCEDURE — 36415 COLL VENOUS BLD VENIPUNCTURE: CPT

## 2021-12-29 PROCEDURE — 71045 X-RAY EXAM CHEST 1 VIEW: CPT

## 2021-12-29 PROCEDURE — 74011250637 HC RX REV CODE- 250/637: Performed by: PHYSICIAN ASSISTANT

## 2021-12-29 PROCEDURE — 99231 SBSQ HOSP IP/OBS SF/LOW 25: CPT | Performed by: INTERNAL MEDICINE

## 2021-12-29 PROCEDURE — 80053 COMPREHEN METABOLIC PANEL: CPT

## 2021-12-29 PROCEDURE — 85025 COMPLETE CBC W/AUTO DIFF WBC: CPT

## 2021-12-29 PROCEDURE — 65270000029 HC RM PRIVATE

## 2021-12-29 PROCEDURE — 80048 BASIC METABOLIC PNL TOTAL CA: CPT

## 2021-12-29 PROCEDURE — 74011000258 HC RX REV CODE- 258: Performed by: HOSPITALIST

## 2021-12-29 PROCEDURE — 81003 URINALYSIS AUTO W/O SCOPE: CPT

## 2021-12-29 PROCEDURE — 87040 BLOOD CULTURE FOR BACTERIA: CPT

## 2021-12-29 PROCEDURE — 93005 ELECTROCARDIOGRAM TRACING: CPT

## 2021-12-29 RX ORDER — PANTOPRAZOLE SODIUM 40 MG/1
40 TABLET, DELAYED RELEASE ORAL 2 TIMES DAILY
Status: DISCONTINUED | OUTPATIENT
Start: 2021-12-29 | End: 2022-01-07

## 2021-12-29 RX ADMIN — PIPERACILLIN SODIUM AND TAZOBACTAM SODIUM 3.38 G: 3; .375 INJECTION, POWDER, LYOPHILIZED, FOR SOLUTION INTRAVENOUS at 01:02

## 2021-12-29 RX ADMIN — VANCOMYCIN HYDROCHLORIDE 1250 MG: 1.25 INJECTION, POWDER, LYOPHILIZED, FOR SOLUTION INTRAVENOUS at 02:34

## 2021-12-29 RX ADMIN — PREDNISOLONE ACETATE 1 DROP: 10 SUSPENSION/ DROPS OPHTHALMIC at 09:11

## 2021-12-29 RX ADMIN — FOLIC ACID 1 MG: 1 TABLET ORAL at 09:11

## 2021-12-29 RX ADMIN — SODIUM CHLORIDE 75 ML/HR: 9 INJECTION, SOLUTION INTRAVENOUS at 16:53

## 2021-12-29 RX ADMIN — LACTULOSE 15 ML: 10 SOLUTION ORAL at 09:10

## 2021-12-29 RX ADMIN — PANTOPRAZOLE SODIUM 40 MG: 40 TABLET, DELAYED RELEASE ORAL at 09:12

## 2021-12-29 RX ADMIN — PIPERACILLIN SODIUM AND TAZOBACTAM SODIUM 3.38 G: 3; .375 INJECTION, POWDER, LYOPHILIZED, FOR SOLUTION INTRAVENOUS at 09:10

## 2021-12-29 RX ADMIN — PSYLLIUM HUSK 1 PACKET: 3.4 POWDER ORAL at 09:10

## 2021-12-29 RX ADMIN — PHENYTOIN 100 MG: 125 SUSPENSION ORAL at 09:11

## 2021-12-29 RX ADMIN — LEVETIRACETAM 1000 MG: 10 INJECTION, SOLUTION INTRAVENOUS at 20:24

## 2021-12-29 RX ADMIN — KETOTIFEN FUMARATE 1 DROP: 0.35 SOLUTION/ DROPS OPHTHALMIC at 09:11

## 2021-12-29 RX ADMIN — PREDNISOLONE ACETATE 1 DROP: 10 SUSPENSION/ DROPS OPHTHALMIC at 20:25

## 2021-12-29 RX ADMIN — APIXABAN 5 MG: 5 TABLET, FILM COATED ORAL at 20:25

## 2021-12-29 RX ADMIN — SODIUM CHLORIDE 1000 ML: 9 INJECTION, SOLUTION INTRAVENOUS at 00:00

## 2021-12-29 RX ADMIN — PANTOPRAZOLE SODIUM 40 MG: 40 TABLET, DELAYED RELEASE ORAL at 20:25

## 2021-12-29 RX ADMIN — LEVETIRACETAM 1000 MG: 10 INJECTION, SOLUTION INTRAVENOUS at 09:10

## 2021-12-29 RX ADMIN — APIXABAN 5 MG: 5 TABLET, FILM COATED ORAL at 09:11

## 2021-12-29 RX ADMIN — MIDODRINE HYDROCHLORIDE 5 MG: 5 TABLET ORAL at 12:00

## 2021-12-29 RX ADMIN — LAMOTRIGINE 300 MG: 100 TABLET ORAL at 09:11

## 2021-12-29 RX ADMIN — PIPERACILLIN SODIUM AND TAZOBACTAM SODIUM 3.38 G: 3; .375 INJECTION, POWDER, LYOPHILIZED, FOR SOLUTION INTRAVENOUS at 16:51

## 2021-12-29 RX ADMIN — KETOTIFEN FUMARATE 1 DROP: 0.35 SOLUTION/ DROPS OPHTHALMIC at 20:25

## 2021-12-29 RX ADMIN — MIDODRINE HYDROCHLORIDE 5 MG: 5 TABLET ORAL at 16:51

## 2021-12-29 RX ADMIN — PHENYTOIN 130 MG: 125 SUSPENSION ORAL at 20:24

## 2021-12-29 RX ADMIN — CETIRIZINE HYDROCHLORIDE 10 MG: 10 TABLET, FILM COATED ORAL at 09:11

## 2021-12-29 RX ADMIN — LAMOTRIGINE 300 MG: 100 TABLET ORAL at 20:24

## 2021-12-29 RX ADMIN — SODIUM CHLORIDE 770 ML: 9 INJECTION, SOLUTION INTRAVENOUS at 01:01

## 2021-12-29 RX ADMIN — ACETAMINOPHEN 650 MG: 650 SUPPOSITORY RECTAL at 00:47

## 2021-12-29 RX ADMIN — MIDODRINE HYDROCHLORIDE 5 MG: 5 TABLET ORAL at 09:11

## 2021-12-29 NOTE — PROGRESS NOTES
Was paged that patient with vomiting after tube feeding. Is now tachycardic, tachypneic, febrile and with a BP of 94/48. Concern of sepsis secondary to aspiration pneumonia.   -sepsis fluid bolus  -blood cultures  -vanc and zosyn  -check CMP, CBC, lactic acid  -Check UA and CXR

## 2021-12-29 NOTE — PROGRESS NOTES
Progress Note    Patient: Marquis Tapia MRN: [id number]  SSN: [id number]    YOB: 1975  Age: 55 y.o. Sex: male      Admit Date: 12/17/2021    LOS: 12 days     Subjective:   GI in consultation for peg tube placement    Patient is alert but non-verbal. He is status post peg tube placement on 10/27/2021. He is tolerating peg tube feeding without difficulty at this time. He does have Jevity infusing at 20 ml/hr. Peg tube site without redness or drainage. Patient did have an episode of vomiting last evening after tube feeding but none since. Chest x-ray this morning shows no developing consolidation but does show persistent post inflammatory or related to prior aspiration reticular nodule pattern. there was a concern for sepsis secondary to aspiration pneumonia. He was started on IV Vancomycin and zosyn. Blood cultures are pending. His tube feedings were held until this afternoon. Chest x-ray 12/29/2021: IMPRESSION   Single portable AP view compared to December 23, 2021. Generator pack on the left with electrode extending to the left side of the neck. Suboptimal  inspiratory film compromises visualization of the lower lung fields. Heart size normal. No mediastinal widening or shift. Reticular nodular pattern in the lungs persists and may be postinflammatory or related to prior aspiration. No developing consolidation. Negative for pulmonary edema, pleural effusion or pneumothorax. No acute fractures. Negative for subdiaphragmatic free air. History of Present Illness: Ang Marks is a 55 y. o. male who is seen in consultation for peg tube placement. Mr. Rhoda Matias has a history of intellectual disability and medical history obtained from patient's mother Tammy Powers and medical records. He does reside in a [address] [place] [address] and has been in the current home for less than a week per patient's mother. He is bedridden requiring total care.  He has been on a soft diet prior to admission. [facility] was sent to the ED with complaints of vomiting and lethargy. He has had poor oral intake and reports of no bowel movements even after having an enema. The staff report he did vomit multiple times. His baseline function is a few word and able to follow simple commands. He does have bilateral contracted hands. While in the ED he was found to have increased temperature, hypertension, and tachycardia meeting sepsis criteria.  His chest x-ray shows suspected aspiration pneumonia. He is on IV zosyn and followed by infectious Disease.  CT of the abdomen and pelvis shows nonspecific abnormalities but no significant intra-abdominal pathology. [facility] has a past medical history significant for Intellectual disability, seizure disorder, DVT in the past and was on xarelto 10 mg preventative. He was diagnosed on admission with acute thrombus present in the left external iliac vein, left common femoral vein, and left proximal femoral vein.  CTA of chest shows extensive bilateral reticulonodular infiltrated throughout both lungs. No evidence of Pulmonary Embolus. Spoke at length with patient's mother Yenifer Escobedo and discussed peg tube placement. All questions answered at this time. Patient's mother is in agreement with the peg tube. Discussed patient is currently on the Eliquis for a new diagnosis of DVT in the left leg will need to consult hematology for possible heparin drip prior to peg tube placement. Plan for possible placement on Monday based on hematology's recommendations.      CTA chest 12/21/2021: IMPRESSION  1. Extensive bilateral reticulonodular infiltrates throughout both lungs similar  to the previous study although both lungs were not included in their entirety on the previous study. Findings may represent infectious, inflammatory or  neoplastic process. Recommend follow-up to. No evidence of pulmonary embolus, aortic aneurysm or aortic dissection. 3. Borderline enlarged gastrohepatic lymph node.   4. Thickened esophagus, recommend clinical evaluation        Duplex lower extremities 12/20/2021:   · No evidence of acute deep vein thrombosis in the right common femoral, femoral, popliteal, posterior tibial, and peroneal veins. The veins were imaged in the transverse and longitudinal planes. The vessels showed normal color filling and compressibility. Doppler interrogation showed phasic and spontaneous flow. · Acute thrombus present in the left external iliac vein. · Acute thrombus present in the left common femoral vein. · Acute thrombus present in the left proximal femoral vein.     CT abdomen/pelvis 12/17/2021:   IMPRESSION     1. Small focus of intraluminal gas within the urinary bladder. Please correlate for recent instrumentation or other causes of intraluminal gas, including an infectious process. 2. Somewhat reticular nodular opacities in the lung bases, suboptimally  evaluated due to respiratory motion. Some of these have what appears to be a tree and bud morphology, suggesting either chronic aspiration or an infectious or inflammatory small airways disease. Otherwise a fibrotic process should be considered. This would be better characterized with dedicated chest CT as clinically warranted. 3. Underdistention versus circumferential wall thickening of the distal  Esophagus.  4. Probable left trochanteric bursitis with sterility of the fluid  Indeterminate. 5. Probable myositis ossificans surrounding the left hip. Please correlate for history of prior trauma. 6. Enlarged lymph nodes in the upper abdomen, nonspecific. Comparison withoutside imaging would be helpful, if available, to determine longer term stability.  Otherwise short interval follow-up in 3 months is recommended.        Sulma Palomo (mother) [phone]  Chandler Lawrence (father) [phone]      Objective:     Vitals:    12/29/21 0109 12/29/21 0238 12/29/21 0917 12/29/21 1412   BP: (!) 101/55 (!) 92/56 102/70 105/69   Pulse: 98 93 92 80   Resp: 20 21 20 20   Temp:  99.8 °F (37.7 °C) 99.5 °F (37.5 °C) 99.1 °F (37.3 °C)   SpO2: 97% 96% 96% 97%   Weight:       Height:            Intake and Output:  Current Shift: No intake/output data recorded. Last three shifts: No intake/output data recorded. Physical Exam:   Skin:  Extremities and face reveal no rashes. No mark erythema. HEENT: Sclerae anicteric. Extra-occular muscles are intact. No abnormal pigmentation of the lips. The neck is supple. Cardiovascular: Regular rate and rhythm. Respiratory:  Comfortable breathing with no accessory muscle use. GI:  Abdomen nondistended, soft, and nontender. No enlargement of the liver or spleen. No masses palpable.   Rectal:  Deferred  Musculoskeletal: Contracted upper/lower extremities  Neurological:  Intellectual Disability  Psychiatric:  Alert, non-verbal  Lymphatic:  No visible adenopathy      Lab/Data Review:  Recent Results (from the past 24 hour(s))   URINALYSIS W/ RFLX MICROSCOPIC    Collection Time: 12/29/21  5:50 AM   Result Value Ref Range    Color Yellow/Straw      Appearance Clear Clear      Specific gravity 1.005 1.003 - 1.030      pH (UA) 7.0 5.0 - 8.0      Protein Negative Negative mg/dL    Glucose Negative Negative mg/dL    Ketone Negative Negative mg/dL    Bilirubin Negative Negative      Blood Negative Negative      Urobilinogen 0.1 0.1 - 1.0 EU/dL    Nitrites Negative Negative      Leukocyte Esterase Negative Negative      WBC 0-4 0 - 4 /hpf    RBC 0-5 0 - 5 /hpf    Bacteria Negative Negative /hpf    Mucus Trace /lpf   METABOLIC PANEL, BASIC    Collection Time: 12/29/21  7:21 AM   Result Value Ref Range    Sodium 141 136 - 145 mmol/L    Potassium 3.6 3.5 - 5.1 mmol/L    Chloride 109 (H) 97 - 108 mmol/L    CO2 26 21 - 32 mmol/L    Anion gap 6 5 - 15 mmol/L    Glucose 103 (H) 65 - 100 mg/dL    BUN 3 (L) 6 - 20 mg/dL    Creatinine 0.61 (L) 0.70 - 1.30 mg/dL    BUN/Creatinine ratio 5 (L) 12 - 20      GFR est AA >60 >60 ml/min/1.73m2    GFR est non-AA >60 >60 ml/min/1.73m2 Calcium 8.6 8.5 - 10.1 mg/dL   LACTIC ACID    Collection Time: 12/29/21  7:21 AM   Result Value Ref Range    Lactic acid 1.0 0.4 - 2.0 mmol/L   METABOLIC PANEL, COMPREHENSIVE    Collection Time: 12/29/21  7:21 AM   Result Value Ref Range    Sodium 141 136 - 145 mmol/L    Potassium 3.6 3.5 - 5.1 mmol/L    Chloride 110 (H) 97 - 108 mmol/L    CO2 24 21 - 32 mmol/L    Anion gap 7 5 - 15 mmol/L    Glucose 102 (H) 65 - 100 mg/dL    BUN 3 (L) 6 - 20 mg/dL    Creatinine 0.59 (L) 0.70 - 1.30 mg/dL    BUN/Creatinine ratio 5 (L) 12 - 20      GFR est AA >60 >60 ml/min/1.73m2    GFR est non-AA >60 >60 ml/min/1.73m2    Calcium 8.4 (L) 8.5 - 10.1 mg/dL    Bilirubin, total 0.2 0.2 - 1.0 mg/dL    AST (SGOT) 44 (H) 15 - 37 U/L    ALT (SGPT) 32 12 - 78 U/L    Alk. phosphatase 207 (H) 45 - 117 U/L    Protein, total 6.4 6.4 - 8.2 g/dL    Albumin 2.4 (L) 3.5 - 5.0 g/dL    Globulin 4.0 2.0 - 4.0 g/dL    A-G Ratio 0.6 (L) 1.1 - 2.2     CBC WITH AUTOMATED DIFF    Collection Time: 12/29/21  7:21 AM   Result Value Ref Range    WBC 9.5 4.1 - 11.1 K/uL    RBC 3.41 (L) 4.10 - 5.70 M/uL    HGB 10.3 (L) 12.1 - 17.0 g/dL    HCT 33.2 (L) 36.6 - 50.3 %    MCV 97.4 80.0 - 99.0 FL    MCH 30.2 26.0 - 34.0 PG    MCHC 31.0 30.0 - 36.5 g/dL    RDW 13.9 11.5 - 14.5 %    PLATELET 344 949 - 594 K/uL    MPV 9.7 8.9 - 12.9 FL    NRBC 0.0 0.0  WBC    ABSOLUTE NRBC 0.00 0.00 - 0.01 K/uL    NEUTROPHILS 65 32 - 75 %    LYMPHOCYTES 21 12 - 49 %    MONOCYTES 8 5 - 13 %    EOSINOPHILS 6 0 - 7 %    BASOPHILS 0 0 - 1 %    IMMATURE GRANULOCYTES 0 0 - 0.5 %    ABS. NEUTROPHILS 6.1 1.8 - 8.0 K/UL    ABS. LYMPHOCYTES 2.0 0.8 - 3.5 K/UL    ABS. MONOCYTES 0.8 0.0 - 1.0 K/UL    ABS. EOSINOPHILS 0.6 (H) 0.0 - 0.4 K/UL    ABS. BASOPHILS 0.0 0.0 - 0.1 K/UL    ABS. IMM. GRANS. 0.0 0.00 - 0.04 K/UL    DF AUTOMATED                XR CHEST PORT   Final Result      Single portable AP view compared to December 23, 2021.  Generator pack on the   left with electrode extending to the left side of the neck. Suboptimal   inspiratory film compromises visualization of the lower lung fields. Heart size normal. No mediastinal widening or shift. Reticular nodular pattern in the lungs persists and may be postinflammatory or   related to prior aspiration. No developing consolidation. Negative for pulmonary   edema, pleural effusion or pneumothorax. No acute fractures. Negative for subdiaphragmatic free air. XR CHEST PORT   Final Result   1. Nasogastric tube tip overlies the right upper quadrant, likely the gastric   antrum or duodenal bulb. 2. Persistent reticulonodular opacities in both lungs. CTA CHEST W OR W WO CONT   Final Result   1. Extensive bilateral reticulonodular infiltrates throughout both lungs similar   to the previous study although both lungs were not included in their entirety on   the previous study. Findings may represent infectious, inflammatory or   neoplastic process. Recommend follow-up to. No evidence of pulmonary embolus,   aortic aneurysm or aortic dissection. 3. Borderline enlarged gastrohepatic lymph node. 4. Thickened esophagus, recommend clinical evaluation. DUPLEX LOWER EXT VENOUS BILAT   Final Result      XR CHEST PORT   Final Result   No significant interval change. CT ABD PELV W CONT   Final Result      1. Small focus of intraluminal gas within the urinary bladder. Please correlate   for recent instrumentation or other causes of intraluminal gas, including an   infectious process. 2. Somewhat reticular nodular opacities in the lung bases, suboptimally   evaluated due to respiratory motion. Some of these have what appears to be a   tree and bud morphology, suggesting either chronic aspiration or an infectious   or inflammatory small airways disease. Otherwise a fibrotic process should be   considered. This would be better characterized with dedicated chest CT as   clinically warranted.    3. Underdistention versus circumferential wall thickening of the distal   esophagus. 4. Probable left trochanteric bursitis with sterility of the fluid   indeterminate. 5. Probable myositis ossificans surrounding the left hip. Please correlate for   history of prior trauma. 6. Enlarged lymph nodes in the upper abdomen, nonspecific. Comparison with   outside imaging would be helpful, if available, to determine longer term   stability. Otherwise short interval follow-up in 3 months is recommended. XR CHEST SNGL V   Final Result   Patchy opacities within the lungs, nonspecific, but leading differential   considerations include infection or pulmonary edema. Superimposed acute disease   on an underlying chronic interstitial process is also possible. Radiographic   follow-up is recommended to exclude other causes of opacification. XR ABD (KUB)   Final Result   Mildly prominent gas-filled loops of bowel within the central abdomen with   overall nonobstructive bowel gas pattern. Assessment:     Active Problems:    Sepsis ([address] (12/17/2021)      Aspiration pneumonia ([address] (12/17/2021)      Respiratory failure with hypoxia ([address] (12/21/2021)      DVT (deep venous thrombosis) ([address] (12/23/2021)        Plan:   1. Dysphagia       S/P  peg tube placement Monday 12/27/21      Feeding per nutritionist recommendation      Hematology input appreciated      Bolster mild compression to skin wall,       clean wound wth peroxide and water       apply tripple antibiotic cream twice a day  2. Sepsis 2/2 aspiration pneumonia      Infectious Disease input appreciated      Continue IV zosyn      Re-started on IV Vancomycin      Currently afebrile        Currently on 2 liters oxygen via nasal cannula  3. Seizure disorder      Neurology input appreciated      Continue home medications  4. Acute Left leg DVT     Eliquis re-started      Hematology input appreciated    Thank you for allowing me to participate in this patients care.    Plan discussed with Dr. Yamel Loaiza and he approves.     Signed By: Kylah Magallanes NP     December 29, 2021

## 2021-12-29 NOTE — PROGRESS NOTES
Hospitalist Progress Note    Subjective:   Daily Progress Note: 12/29/2021 8:46 AM    Hospital Course: The patient is a 27-year-old male with a PMH significant for intellectual disabilities, seizure disorder, quadriplegia. Lives in a group home. Caregivers state he had not been eating or drinking for 4 days or having bowel movements. Unsuccessful enema. Then he started vomiting several times. Brought to the emergency room he was hypertensive, tachycardic and altered from baseline. Chest x-ray consistent with aspiration pneumonia. He presents tachycardic and febrile with hypertension. Admitted for further management of sepsis, aspiration pneumonia, respiratory failure with hypoxia, breakthrough seizure activity. Blood pressure decreasing required ICU admission and septic shock and started on Levophed. Started on vancomycin and Zosyn for aspiration pneumonia. Required nasal cannula oxygen for acute hypoxic respiratory failure. Weaned off pressor therapy and transferred to medical floor for further management. Left leg Doppler study positive for left external iliac vein thrombosis, left common femoral vein thrombosis and left proximal femoral vein thrombosis. He was started on Eliquis. Decreased antibiotic to monotherapy IV Zosyn. Started on Ketotifen and prednisone eyedrop for left sclera edema. Due to high risk of aspiration secondary to chronic disabilities and anatomical swallowing weakness, inability to maintain hydration and nutrition status he would benefit from feeding tube placement. GI has been consulted, plan for PEG tube placement 12/27/2021. Heparin drip for bridge therapy while holding Eliquis. On the night of 12/28/2021 patient began vomiting tube feeds and was tachycardic/tachypneic. Concern for sepsis secondary aspiration pneumonia. Started back on IV vancomycin and Zosyn.   Repeat chest x-ray showed  No developing consolidation but does show persistent postinflammatory or related to prior aspiration reticular nodule pattern    Subjective: Patient is lying in bed.   Noncommunicative  Current Facility-Administered Medications   Medication Dose Route Frequency    [START ON 12/30/2021] vancomycin (VANCOCIN) 1,000 mg in 0.9% sodium chloride 250 mL (VIAL-MATE)  1,000 mg IntraVENous Q12H    [START ON 12/30/2021] VANCOMYCIN INFORMATION NOTE   Other ONCE    VANCOMYCIN INFORMATION NOTE 1 Each  1 Each Other Rx Dosing/Monitoring    sodium chloride (NS) flush 5-10 mL  5-10 mL IntraVENous PRN    piperacillin-tazobactam (ZOSYN) 3.375 g in 0.9% sodium chloride (MBP/ADV) 100 mL MBP  3.375 g IntraVENous Q8H    sodium chloride (NS) flush 5-40 mL  5-40 mL IntraVENous PRN    midodrine (PROAMATINE) tablet 5 mg  5 mg Oral TID WITH MEALS    prednisoLONE acetate (PRED FORTE) 1 % ophthalmic suspension 1 Drop  1 Drop Left Eye BID    apixaban (ELIQUIS) tablet 5 mg  5 mg Oral BID    0.9% sodium chloride infusion  75 mL/hr IntraVENous CONTINUOUS    levETIRAcetam in saline (iso-os) (KEPPRA) infusion 1,000 mg  1,000 mg IntraVENous BID    acetaminophen (TYLENOL) suppository 650 mg  650 mg Rectal Q4H PRN    ondansetron (ZOFRAN) injection 4 mg  4 mg IntraVENous Q6H PRN    phenytoin (DILANTIN) 100 mg/4 mL oral suspension 100 mg  100 mg Oral DAILY    phenytoin (DILANTIN) 100 mg/4 mL oral suspension 130 mg  130 mg Oral QHS    cetirizine (ZYRTEC) tablet 10 mg  10 mg Oral DAILY    folic acid (FOLVITE) tablet 1 mg  1 mg Oral DAILY    ketotifen (ZADITOR) 0.025 % (0.035 %) ophthalmic solution 1 Drop  1 Drop Both Eyes BID    lactulose (CHRONULAC) 10 gram/15 mL solution 15 mL  10 g Oral DAILY    lamoTRIgine (LaMICtal) tablet 300 mg  300 mg Oral BID    psyllium husk-aspartame (METAMUCIL FIBER) packet 1 Packet  1 Packet Oral DAILY    sodium chloride (NS) flush 5-40 mL  5-40 mL IntraVENous PRN    acetaminophen (TYLENOL) tablet 650 mg  650 mg Oral Q4H PRN    albuterol (PROVENTIL HFA, VENTOLIN HFA, PROAIR HFA) inhaler 2 Puff  2 Puff Inhalation Q4H PRN        Review of Systems  Co unable to assess due to mental status      Objective:     Visit Vitals  BP (!) 92/56   Pulse 93   Temp 99.8 °F (37.7 °C)   Resp 21   Ht 5' (1.524 m)   Wt 59 kg (130 lb)   SpO2 96%   BMI 25.39 kg/m²    O2 Flow Rate (L/min): 2 l/min O2 Device: Nasal cannula    Temp (24hrs), Av.8 °F (37.7 °C), Min:98.9 °F (37.2 °C), Max:100.4 °F (38 °C)      No intake/output data recorded. No intake/output data recorded. PHYSICAL EXAM:  Constitutional: No acute distress  Skin: Extremities and face reveal no rashes. HEENT: Sclerae anicteric. Extra-occular muscles are intact. No oral ulcers. The neck is supple and no masses. Cardiovascular: Regular rate and rhythm. Respiratory:  Clear breath sounds bilaterally with no wheezes, rales, or rhonchi. GI: Abdomen nondistended, soft, and nontender. Normal active bowel sounds. Musculoskeletal: No pitting edema of the lower legs. Able to move all ext  Neurological:  Patient is alert and oriented.  Cranial nerves II-XII grossly intact  Psychiatric: Mood appears appropriate       Data Review    Recent Results (from the past 24 hour(s))   URINALYSIS W/ RFLX MICROSCOPIC    Collection Time: 21  5:50 AM   Result Value Ref Range    Color Yellow/Straw      Appearance Clear Clear      Specific gravity 1.005 1.003 - 1.030      pH (UA) 7.0 5.0 - 8.0      Protein Negative Negative mg/dL    Glucose Negative Negative mg/dL    Ketone Negative Negative mg/dL    Bilirubin Negative Negative      Blood Negative Negative      Urobilinogen 0.1 0.1 - 1.0 EU/dL    Nitrites Negative Negative      Leukocyte Esterase Negative Negative      WBC 0-4 0 - 4 /hpf    RBC 0-5 0 - 5 /hpf    Bacteria Negative Negative /hpf    Mucus Trace /lpf   METABOLIC PANEL, BASIC    Collection Time: 21  7:21 AM   Result Value Ref Range    Sodium 141 136 - 145 mmol/L    Potassium 3.6 3.5 - 5.1 mmol/L    Chloride 109 (H) 97 - 108 mmol/L    CO2 26 21 - 32 mmol/L    Anion gap 6 5 - 15 mmol/L    Glucose 103 (H) 65 - 100 mg/dL    BUN 3 (L) 6 - 20 mg/dL    Creatinine 0.61 (L) 0.70 - 1.30 mg/dL    BUN/Creatinine ratio 5 (L) 12 - 20      GFR est AA >60 >60 ml/min/1.73m2    GFR est non-AA >60 >60 ml/min/1.73m2    Calcium 8.6 8.5 - 10.1 mg/dL   LACTIC ACID    Collection Time: 12/29/21  7:21 AM   Result Value Ref Range    Lactic acid 1.0 0.4 - 2.0 mmol/L   CBC WITH AUTOMATED DIFF    Collection Time: 12/29/21  7:21 AM   Result Value Ref Range    WBC 9.5 4.1 - 11.1 K/uL    RBC 3.41 (L) 4.10 - 5.70 M/uL    HGB 10.3 (L) 12.1 - 17.0 g/dL    HCT 33.2 (L) 36.6 - 50.3 %    MCV 97.4 80.0 - 99.0 FL    MCH 30.2 26.0 - 34.0 PG    MCHC 31.0 30.0 - 36.5 g/dL    RDW 13.9 11.5 - 14.5 %    PLATELET 260 380 - 995 K/uL    MPV 9.7 8.9 - 12.9 FL    NRBC 0.0 0.0  WBC    ABSOLUTE NRBC 0.00 0.00 - 0.01 K/uL    NEUTROPHILS 65 32 - 75 %    LYMPHOCYTES 21 12 - 49 %    MONOCYTES 8 5 - 13 %    EOSINOPHILS 6 0 - 7 %    BASOPHILS 0 0 - 1 %    IMMATURE GRANULOCYTES 0 0 - 0.5 %    ABS. NEUTROPHILS 6.1 1.8 - 8.0 K/UL    ABS. LYMPHOCYTES 2.0 0.8 - 3.5 K/UL    ABS. MONOCYTES 0.8 0.0 - 1.0 K/UL    ABS. EOSINOPHILS 0.6 (H) 0.0 - 0.4 K/UL    ABS. BASOPHILS 0.0 0.0 - 0.1 K/UL    ABS. IMM. GRANS. 0.0 0.00 - 0.04 K/UL    DF AUTOMATED         Radiology review: Chest xray    Assessment:   1. Acute hypoxic respiratory failure second aspiration pneumonia  2. Septic shock  3. Seizure disorder  4. Intellectual disability/quadriplegia  5. DVT  6. Hypertension    Plan:    1. Patient had completed a 10-day course of IV Zosyn. Infectious disease consulted. However last night patient began vomiting tube feedings and there was a risk of aspiration. Placed back on IV vancomycin and Zosyn. 2.  Patient is off of pressors. 3.  Continue with Keppra 100 mg twice daily, Dilantin 100 mg daily and on at 30 mg at bedtime. Neurology consulted and has signed off  4. Due to risk of aspiration.   GI consulted for PEG tube placement. Patient will need to be tolerating PEG tube feedings prior to discharge. Return back to group facility when stable  5. Venous Doppler positive for DVT. Patient is on Eliquis. 6.  Patient on midodrine 5 mg twice daily. Blood pressure is low but stable  7. CBC BMP in a.m. Dispo: Greater than 48 hours. Barriers include tolerating tube feeding and improving aspiration pneumonia    CODE STATUS full code     DVT prophylaxis: Eliquis  Ulcer prophylaxis: Protonix    Care Plan discussed with: Patient/Family, Nurse and     Total time spent with patient: 34 minutes.

## 2021-12-29 NOTE — PROGRESS NOTES
Consult for Vancomycin Dosing by Pharmacy by Elena Mcallister provided for this 55y.o. year old male , for indication of aspiration pneumonia/MRSA suspected. Day of Therapy: 1  Goal of Level(s): (AUC = 400-600) or (trough = 10-15mcg/dL)     Other Current Antibiotics: Zosyn    Significant Cultures:       Date                             Culture                                       Organism       All Micro Results       Procedure Component Value Units Date/Time    CULTURE, BLOOD [[id number]]     Order Status: Sent Specimen: Blood     CULTURE, BLOOD [[id number]]     Order Status: Sent Specimen: Blood     CULTURE, BLOOD [[id number]] Collected: 12/18/21 1100    Order Status: Completed Specimen: Blood Updated: 12/24/21 1039     Special Requests: No Special Requests        Culture result: No growth 6 days       MRSA SCREEN - PCR (NASAL) [[id number]] Collected: 12/18/21 1100    Order Status: Canceled Specimen: Swab     CULTURE, SPUTUM/BRONCH/OTH [[id number]] Collected: 12/17/21 2115    Order Status: Canceled Specimen: Sputum     COVID-19 RAPID TEST [[id number]] Collected: 12/17/21 1620    Order Status: Completed Specimen: Nasopharyngeal Updated: 12/17/21 1740     Specimen source Nasopharyngeal        COVID-19 rapid test Not Detected        Comment: Rapid Abbott ID Now   Rapid NAAT:  The specimen is NEGATIVE for SARS-CoV-2, the novel coronavirus associated with COVID-19. Negative results should be treated as presumptive and, if inconsistent with clinical signs and symptoms or necessary for patient management, should be tested with an alternative molecular assay. Negative results do not preclude SARS-CoV-2 infection and should not be used as the sole basis for patient management decisions. This test has been authorized by the FDA under   an Emergency Use Authorization (EUA) for use by authorized laboratories.  Fact sheet for Healthcare Providers: ConventionUpdate.co.nz Fact sheet for Patients: iTendency.uy   Methodology: Isothermal Nucleic Acid Amplification                 Serum Creatinine Creatinine   Date/Time Value Ref Range Status   12/28/2021 07:38 AM 0.60 (L) 0.70 - 1.30 mg/dL Final   12/27/2021 09:26 AM 0.62 (L) 0.70 - 1.30 mg/dL Final   12/26/2021 06:14 AM 0.66 (L) 0.70 - 1.30 mg/dL Final      Creatinine Clearance Estimated Creatinine Clearance: 93.3 mL/min (A) (by C-G formula based on SCr of 0.6 mg/dL (L)). BUN Lab Results   Component Value Date/Time    BUN 5 (L) 12/28/2021 07:38 AM      WBC Lab Results   Component Value Date/Time    WBC 9.0 12/28/2021 07:38 AM      Temp Temp Readings from Last 1 Encounters:   12/28/21 100.1 °F (37.8 °C)      C-Reactive Protein C-Reactive protein   Date Value Ref Range Status   12/20/2021 6.44 (H) 0.00 - 0.60 mg/dL Final      Procalcitonin Lab Results   Component Value Date/Time    Procalcitonin <0.05 (H) 12/17/2021 11:30 AM        Last Level: No results found for: VANCT   Vancomycin, random   Date/Time Value Ref Range Status   12/20/2021 12:40 PM 11.0 ug/mL Final     Comment:     No reference range has been established. Ht Readings from Last 1 Encounters:   12/21/21 152.4 cm (60\")        Wt Readings from Last 1 Encounters:   12/17/21 59 kg (130 lb)     Ideal body weight: 50 kg (110 lb 3.7 oz)  Adjusted ideal body weight: 53.6 kg (118 lb 2.2 oz)     Previous Regimen: 1250mg IV x 1 at 01:00 12/29    New Regimen:   Previously on vancomycin protocol, last dose 12/20. Now tachycardic, tachypneic, febrile and with a BP of 94/48, concerning for sepsis secondary to aspiration pneumonia. Begin vancomycin 1000mg IV Q12H today at 13:00. Trough level 12/30 at 11:00. Pharmacy to follow daily and will make changes to dose and/or frequency based on clinical status.      _________________________________     Andreas Apple, [address] [facility]

## 2021-12-29 NOTE — PROGRESS NOTES
Comprehensive Nutrition Assessment    Type and Reason for Visit: Reassess    Nutrition Recommendations/Plan:   Initiate TF via PEG of Jevity 1.5 at 10mL/hr     Increase by 5ml q4hrs as tolerated to goal of 45mL/hr, continuous  Flush with 120mL free water q4hr  Goal feeds provide 1620kcal, 70g protein, 1541mL fluid (>/= 100% est needs)    Nutrition Assessment:  Admit after no PO x4days pta, +constipation & vomiting, +fever, likely aspiration pna. PO continued to be suboptimal despite supplementation, (12/28) PEG placed. RD provided TF recs 12/23, RN started TF yesterday however held after vomiting event. RD spoke to RN today who re-started TF ~1pm, discussed starting at 10ml/hr and advancing by 5ml q4h as tolerated to goal. Labs: H/H 10.3/33.2, BG wnl. Meds: Eliquis, zyrtec, B9, ketotifen, lactulose, Keprra, zofran, PPI, phenytoin, zosyn. Malnutrition Assessment:  Malnutrition Status:  Mild malnutrition    Context:  Acute illness       Estimated Daily Nutrient Needs:  Energy (kcal): 1500kcl (25kcal/kg); Weight Used for Energy Requirements: Current  Protein (g): 60g (1.0g/kg); Weight Used for Protein Requirements: Current  Fluid (ml/day): 1500ml; Method Used for Fluid Requirements: 1 ml/kcal      Nutrition Related Findings:  NFPE without acute findings. Last BM loose 12/27. +vomiting after TF initiation yesterday. No edema. SLP following for dysphagia, pt refused PO trials yesterday. Wounds:    Surgical incision (new PEG)       Current Nutrition Therapies:  ADULT ORAL NUTRITION SUPPLEMENT Breakfast, Lunch, Dinner; Other Supplement; Ensure Plus  DIET NPO Sips of Water with Meds  ADULT TUBE FEEDING PEG; Standard with Fiber; Delivery Method: Continuous; Continuous Initial Rate (mL/hr): 30; Continuous Advance Tube Feeding: Yes; Advancement Volume (mL/hr): 20; Advancement Frequency: Q 4 hours; Continuous Goal Rate (mL/hr): 45. ..     Anthropometric Measures:  · Height:  5' (152.4 cm)  · Current Body Wt:  59 kg (130 lb 1.1 oz)   · Admission Body Wt:  130 lb 1.1 oz    · Ideal Body Wt:  106 lbs:  122.7 %   · Adjusted Body Weight:  146.3; Weight Adjustment for: Quadriplegia   · Adjusted BMI:  28.6    · BMI Category: Overweight (BMI 25.0-29. 9)       Nutrition Diagnosis:   · Swallowing difficulty related to swallowing difficulty as evidenced by intake 0-25%      Nutrition Interventions:   Food and/or Nutrient Delivery: Continue NPO,Continue tube feeding  Nutrition Education and Counseling: No recommendations at this time,Education not appropriate  Coordination of Nutrition Care: Continue to monitor while inpatient    Goals:  Meets >/=75% of nutritional needs vis PEG tube within 7 days, wt maintenance, regular BMs       Nutrition Monitoring and Evaluation:   Behavioral-Environmental Outcomes: None identified  Food/Nutrient Intake Outcomes: Diet advancement/tolerance,Enteral nutrition intake/tolerance  Physical Signs/Symptoms Outcomes: Chewing or swallowing,Weight    Discharge Planning:    Enteral nutrition     Electronically signed by Maru Francisco on 12/29/2021 at 12:53 PM    Contact: Ext [phone], or via Sina

## 2021-12-29 NOTE — PROGRESS NOTES
Infectious Disease Progress Note           Subjective:   Stable, no change in clinical status, no acute events since last seen, tolerating tube feeds   Objective:   Physical Exam:     Visit Vitals  /69   Pulse 80   Temp 99.1 °F (37.3 °C)   Resp 20   Ht 5' (1.524 m)   Wt 130 lb (59 kg)   SpO2 97%   BMI 25.39 kg/m²    O2 Flow Rate (L/min): 2 l/min O2 Device: Nasal cannula    Temp (24hrs), Av.6 °F (37.6 °C), Min:99.1 °F (37.3 °C), Max:100.1 °F (37.8 °C)    No intake/output data recorded. No intake/output data recorded.     General: NAD, alert, non-verbal   HEENT: ALEJO, Moist mucosa, decreased left sclera erythema  Lungs: CTA b/l, decreased at the bases, no wheeze/rhonchi   Heart: S1S2+, RRR, no murmur  Abdo: Soft, NT, ND, +BS, +Peg tube   Exts: Contracted exts, some leg swelling   Skin: No wounds, No rashes or lesions    Data Review:       Recent Days:  Recent Labs     21  0738 21  0926   WBC 9.5 9.0 8.4   HGB 10.3* 10.6* 11.0*   HCT 33.2* 34.3* 34.8*    315 336     Recent Labs     21  0738 21  0926   BUN 3*  3* 5* 4*   CREA 0.59*  0.61* 0.60* 0.62*       Lab Results   Component Value Date/Time    C-Reactive protein 6.44 (H) 2021 05:00 AM        Microbiology     Results     Procedure Component Value Units Date/Time    CULTURE, BLOOD [[id number]] Collected: 21    Order Status: Completed Specimen: Blood Updated: 21    CULTURE, BLOOD [[id number]] Collected: 21    Order Status: Completed Specimen: Blood Updated: 21    MRSA SCREEN - PCR (NASAL) [[id number]] Collected: 21 1100    Order Status: Canceled Specimen: Swab     CULTURE, BLOOD [[id number]] Collected: 21 1100    Order Status: Completed Specimen: Blood Updated: 21 1039     Special Requests: No Special Requests        Culture result: No growth 6 days       CULTURE, SPUTUM/BRONCH/OTH [[id number]] Collected: 12/17/21 2115    Order Status: Canceled Specimen: Sputum     COVID-19 RAPID TEST [[id number]] Collected: 12/17/21 1620    Order Status: Completed Specimen: Nasopharyngeal Updated: 12/17/21 1740     Specimen source Nasopharyngeal        COVID-19 rapid test Not Detected        Comment: Rapid Abbott ID Now   Rapid NAAT:  The specimen is NEGATIVE for SARS-CoV-2, the novel coronavirus associated with COVID-19. Negative results should be treated as presumptive and, if inconsistent with clinical signs and symptoms or necessary for patient management, should be tested with an alternative molecular assay. Negative results do not preclude SARS-CoV-2 infection and should not be used as the sole basis for patient management decisions. This test has been authorized by the FDA under   an Emergency Use Authorization (EUA) for use by authorized laboratories. Fact sheet for Healthcare Providers: ConventionUpdate.co.nz Fact sheet for Patients: ConventionUpdate.co.nz   Methodology: Isothermal Nucleic Acid Amplification             Diagnostics   CXR Results  (Last 48 hours)               12/29/21 0027  XR CHEST PORT Final result    Impression:      Single portable AP view compared to December 23, 2021. Generator pack on the   left with electrode extending to the left side of the neck. Suboptimal   inspiratory film compromises visualization of the lower lung fields. Heart size normal. No mediastinal widening or shift. Reticular nodular pattern in the lungs persists and may be postinflammatory or   related to prior aspiration. No developing consolidation. Negative for pulmonary   edema, pleural effusion or pneumothorax. No acute fractures. Negative for subdiaphragmatic free air. Narrative:  Chest                    Assessment/Plan     1.  Aspiration pneumonitis, S/p 10 days of Zosyn, on 2L O2 w sats in the upper 90s       Reticular nodular pattern in the lungs persists on CXR (12/29), may be from chronic aspiration       Low grade temps, Tmax of 100.4F, normal Wbc on routine labs       Monitor off antibiotics, consider Augmentin if low grade fevers persist       Routine labs in the morning     2. Dysphagia/poor oral intake: S/p peg tube placement on 12/27. Tolerating tube feeds       3. H/o seizure d/o, Controlled on anti-epileptics    4.  Acute left leg DVT, anticoagulated on Kiki Fuentes MD    12/29/2021

## 2021-12-29 NOTE — PROGRESS NOTES
Spoke with Lorri Bloom [phone]) to talk about looking for new [facility]. She let me know she will be reaching out to [address] [place] [address] to see if they have any availabilities. Will follow up with her after lunch to see what she has found out. She also gave me a name for another [facility] to look into called [address] [place] [address] [address] [id number] ([phone]). Spoke with [place] and have faxed over patients info for review. They do have 1 spot available. 1445: spoke with Marcello Nation about mothers plans for discharge. She would like to pursue looking into the [address] in [address] [place] [address]. I have faxed over clinicals to Lorri Bloom to send over to [address] [place] [address] so they can begin the acceptance process. I was told by Ms. Lang that this process could take as long as 3 weeks but hopes to hear something back much sooner. Explained to her sooner would be more preferable as we expect discharge to occur iin the next several days. She understands but explained due to holiday it may take some time. Will [place] back around and follow up with her tomorrow to see where we are at.

## 2021-12-30 LAB
ANION GAP SERPL CALC-SCNC: 7 MMOL/L (ref 5–15)
BASOPHILS # BLD: 0 K/UL (ref 0–0.1)
BASOPHILS NFR BLD: 0 % (ref 0–1)
BUN SERPL-MCNC: 4 MG/DL (ref 6–20)
BUN/CREAT SERPL: 7 (ref 12–20)
CA-I BLD-MCNC: 8.8 MG/DL (ref 8.5–10.1)
CHLORIDE SERPL-SCNC: 106 MMOL/L (ref 97–108)
CO2 SERPL-SCNC: 26 MMOL/L (ref 21–32)
CREAT SERPL-MCNC: 0.61 MG/DL (ref 0.7–1.3)
DATE LAST DOSE: ABNORMAL
DIFFERENTIAL METHOD BLD: ABNORMAL
EOSINOPHIL # BLD: 0.8 K/UL (ref 0–0.4)
EOSINOPHIL NFR BLD: 9 % (ref 0–7)
ERYTHROCYTE [DISTWIDTH] IN BLOOD BY AUTOMATED COUNT: 13.6 % (ref 11.5–14.5)
GLUCOSE SERPL-MCNC: 115 MG/DL (ref 65–100)
HCT VFR BLD AUTO: 34.6 % (ref 36.6–50.3)
HGB BLD-MCNC: 10.9 G/DL (ref 12.1–17)
IMM GRANULOCYTES # BLD AUTO: 0 K/UL (ref 0–0.04)
IMM GRANULOCYTES NFR BLD AUTO: 0 % (ref 0–0.5)
LYMPHOCYTES # BLD: 1.3 K/UL (ref 0.8–3.5)
LYMPHOCYTES NFR BLD: 16 % (ref 12–49)
MCH RBC QN AUTO: 30.5 PG (ref 26–34)
MCHC RBC AUTO-ENTMCNC: 31.5 G/DL (ref 30–36.5)
MCV RBC AUTO: 96.9 FL (ref 80–99)
MONOCYTES # BLD: 0.6 K/UL (ref 0–1)
MONOCYTES NFR BLD: 7 % (ref 5–13)
NEUTS SEG # BLD: 5.7 K/UL (ref 1.8–8)
NEUTS SEG NFR BLD: 68 % (ref 32–75)
NRBC # BLD: 0 K/UL (ref 0–0.01)
NRBC BLD-RTO: 0 PER 100 WBC
PLATELET # BLD AUTO: 336 K/UL (ref 150–400)
PMV BLD AUTO: 9.8 FL (ref 8.9–12.9)
POTASSIUM SERPL-SCNC: 3.8 MMOL/L (ref 3.5–5.1)
RBC # BLD AUTO: 3.57 M/UL (ref 4.1–5.7)
REPORTED DOSE,DOSE: ABNORMAL UNITS
SODIUM SERPL-SCNC: 139 MMOL/L (ref 136–145)
VANCOMYCIN TROUGH SERPL-MCNC: 2 UG/ML (ref 5–10)
WBC # BLD AUTO: 8.4 K/UL (ref 4.1–11.1)

## 2021-12-30 PROCEDURE — 74011250636 HC RX REV CODE- 250/636: Performed by: HOSPITALIST

## 2021-12-30 PROCEDURE — 85025 COMPLETE CBC W/AUTO DIFF WBC: CPT

## 2021-12-30 PROCEDURE — 74011250637 HC RX REV CODE- 250/637: Performed by: NURSE PRACTITIONER

## 2021-12-30 PROCEDURE — 74011000258 HC RX REV CODE- 258: Performed by: HOSPITALIST

## 2021-12-30 PROCEDURE — 65270000029 HC RM PRIVATE

## 2021-12-30 PROCEDURE — 74011250637 HC RX REV CODE- 250/637: Performed by: PHYSICIAN ASSISTANT

## 2021-12-30 PROCEDURE — 99232 SBSQ HOSP IP/OBS MODERATE 35: CPT | Performed by: INTERNAL MEDICINE

## 2021-12-30 PROCEDURE — 36415 COLL VENOUS BLD VENIPUNCTURE: CPT

## 2021-12-30 PROCEDURE — 80202 ASSAY OF VANCOMYCIN: CPT

## 2021-12-30 PROCEDURE — 74011250637 HC RX REV CODE- 250/637: Performed by: HOSPITALIST

## 2021-12-30 PROCEDURE — 80048 BASIC METABOLIC PNL TOTAL CA: CPT

## 2021-12-30 RX ORDER — ERYTHROMYCIN 5 MG/G
OINTMENT OPHTHALMIC EVERY 8 HOURS
Status: DISPENSED | OUTPATIENT
Start: 2021-12-30 | End: 2022-01-06

## 2021-12-30 RX ADMIN — CETIRIZINE HYDROCHLORIDE 10 MG: 10 TABLET, FILM COATED ORAL at 10:31

## 2021-12-30 RX ADMIN — KETOTIFEN FUMARATE 1 DROP: 0.35 SOLUTION/ DROPS OPHTHALMIC at 10:24

## 2021-12-30 RX ADMIN — LAMOTRIGINE 300 MG: 100 TABLET ORAL at 10:22

## 2021-12-30 RX ADMIN — APIXABAN 5 MG: 5 TABLET, FILM COATED ORAL at 21:00

## 2021-12-30 RX ADMIN — PSYLLIUM HUSK 1 PACKET: 3.4 POWDER ORAL at 10:23

## 2021-12-30 RX ADMIN — FOLIC ACID 1 MG: 1 TABLET ORAL at 10:23

## 2021-12-30 RX ADMIN — PREDNISOLONE ACETATE 1 DROP: 10 SUSPENSION/ DROPS OPHTHALMIC at 10:24

## 2021-12-30 RX ADMIN — PANTOPRAZOLE SODIUM 40 MG: 40 TABLET, DELAYED RELEASE ORAL at 21:00

## 2021-12-30 RX ADMIN — PREDNISOLONE ACETATE 1 DROP: 10 SUSPENSION/ DROPS OPHTHALMIC at 22:30

## 2021-12-30 RX ADMIN — MIDODRINE HYDROCHLORIDE 5 MG: 5 TABLET ORAL at 10:22

## 2021-12-30 RX ADMIN — PIPERACILLIN SODIUM AND TAZOBACTAM SODIUM 3.38 G: 3; .375 INJECTION, POWDER, LYOPHILIZED, FOR SOLUTION INTRAVENOUS at 17:04

## 2021-12-30 RX ADMIN — LEVETIRACETAM 1000 MG: 10 INJECTION, SOLUTION INTRAVENOUS at 10:23

## 2021-12-30 RX ADMIN — LACTULOSE 15 ML: 10 SOLUTION ORAL at 10:23

## 2021-12-30 RX ADMIN — ERYTHROMYCIN: 5 OINTMENT OPHTHALMIC at 22:00

## 2021-12-30 RX ADMIN — PIPERACILLIN SODIUM AND TAZOBACTAM SODIUM 3.38 G: 3; .375 INJECTION, POWDER, LYOPHILIZED, FOR SOLUTION INTRAVENOUS at 10:31

## 2021-12-30 RX ADMIN — MIDODRINE HYDROCHLORIDE 5 MG: 5 TABLET ORAL at 17:04

## 2021-12-30 RX ADMIN — LAMOTRIGINE 300 MG: 100 TABLET ORAL at 22:31

## 2021-12-30 RX ADMIN — VANCOMYCIN HYDROCHLORIDE 1000 MG: 1 INJECTION, POWDER, LYOPHILIZED, FOR SOLUTION INTRAVENOUS at 13:40

## 2021-12-30 RX ADMIN — APIXABAN 5 MG: 5 TABLET, FILM COATED ORAL at 10:23

## 2021-12-30 RX ADMIN — KETOTIFEN FUMARATE 1 DROP: 0.35 SOLUTION/ DROPS OPHTHALMIC at 22:30

## 2021-12-30 RX ADMIN — ERYTHROMYCIN: 5 OINTMENT OPHTHALMIC at 17:04

## 2021-12-30 RX ADMIN — PHENYTOIN 100 MG: 125 SUSPENSION ORAL at 10:22

## 2021-12-30 RX ADMIN — PIPERACILLIN SODIUM AND TAZOBACTAM SODIUM 3.38 G: 3; .375 INJECTION, POWDER, LYOPHILIZED, FOR SOLUTION INTRAVENOUS at 00:23

## 2021-12-30 RX ADMIN — MIDODRINE HYDROCHLORIDE 5 MG: 5 TABLET ORAL at 13:40

## 2021-12-30 RX ADMIN — LEVETIRACETAM 1000 MG: 10 INJECTION, SOLUTION INTRAVENOUS at 22:32

## 2021-12-30 RX ADMIN — PHENYTOIN 130 MG: 125 SUSPENSION ORAL at 22:33

## 2021-12-30 NOTE — PROGRESS NOTES
Spiritual Care Assessment/Progress Note  [facility]      NAME: Aramis Smith      MRN: [id number]  AGE: 55 y.o. SEX: male  Confucianist Affiliation: No preference   Language: English     12/30/2021     Total Time (in minutes): 7     Spiritual Assessment begun in [facility] through conversation with:         []Patient        [] Family    [] Friend(s)        Reason for Consult: Initial/Spiritual assessment, patient floor     Spiritual beliefs: (Please include comment if needed)     [] Identifies with a maría tradition:         [] Supported by a maría community:            [] Claims no spiritual orientation:           [] Seeking spiritual identity:                [] Adheres to an individual form of spirituality:           [x] Not able to assess:                           Identified resources for coping:      [] Prayer                               [] Music                  [] Guided Imagery     [] Family/friends                 [] Pet visits     [] Devotional reading                         [x] Unknown     [] Other:                                               Interventions offered during this visit: (See comments for more details)    Patient Interventions: Other (comment) (Attempt)           Plan of Care:     [] Support spiritual and/or cultural needs    [] Support AMD and/or advance care planning process      [] Support grieving process   [] Coordinate Rites and/or Rituals    [] Coordination with community clergy   [] No spiritual needs identified at this time   [] Detailed Plan of Care below (See Comments)  [] Make referral to Music Therapy  [] Make referral to Pet Therapy     [] Make referral to Addiction services  [] Make referral to [facility]  [] Make referral to Spiritual Care Partner  [] No future visits requested        [x] Contact Spiritual Care for further referrals     Comments:   attempted Mr. Дмитрий Palacios on [address] [place], for initial spiritial assment.  Patient  was sleeping at the time of visit and did not wake to  greetings. Please contact spiritual care with any further referrals. Visited by: Ty Escalante.    Paging Service: [phone])

## 2021-12-30 NOTE — PROGRESS NOTES
Consult for Vancomycin Dosing by Pharmacy by Dr. Apurva Pierre / Paulette Gens provided for this 55y.o. year old male , for indication of aspiration PNA. Day of Therapy: 3  Goal of Level(s): (AUC = 400-600)     Other Current Antibiotics: Zosyn    Significant Cultures: All Micro Results       Procedure Component Value Units Date/Time    CULTURE, BLOOD [[id number]] Collected: 12/29/21 0721    Order Status: Completed Specimen: Blood Updated: 12/29/21 0746    CULTURE, BLOOD [[id number]] Collected: 12/29/21 0720    Order Status: Completed Specimen: Blood Updated: 12/29/21 0745    CULTURE, BLOOD [[id number]] Collected: 12/18/21 1100    Order Status: Completed Specimen: Blood Updated: 12/24/21 1039     Special Requests: No Special Requests        Culture result: No growth 6 days       MRSA SCREEN - PCR (NASAL) [[id number]] Collected: 12/18/21 1100    Order Status: Canceled Specimen: Swab     CULTURE, SPUTUM/BRONCH/OTH [[id number]] Collected: 12/17/21 2115    Order Status: Canceled Specimen: Sputum     COVID-19 RAPID TEST [[id number]] Collected: 12/17/21 1620    Order Status: Completed Specimen: Nasopharyngeal Updated: 12/17/21 1740     Specimen source Nasopharyngeal        COVID-19 rapid test Not Detected        Comment: Rapid Abbott ID Now   Rapid NAAT:  The specimen is NEGATIVE for SARS-CoV-2, the novel coronavirus associated with COVID-19. Negative results should be treated as presumptive and, if inconsistent with clinical signs and symptoms or necessary for patient management, should be tested with an alternative molecular assay. Negative results do not preclude SARS-CoV-2 infection and should not be used as the sole basis for patient management decisions. This test has been authorized by the FDA under   an Emergency Use Authorization (EUA) for use by authorized laboratories.  Fact sheet for Healthcare Providers: [employer]co[employer]nz Fact sheet for Patients: [facility]   Methodology: Isothermal Nucleic Acid Amplification                 Serum Creatinine Creatinine   Date/Time Value Ref Range Status   12/30/2021 09:06 AM 0.61 (L) 0.70 - 1.30 mg/dL Final   12/29/2021 07:21 AM 0.61 (L) 0.70 - 1.30 mg/dL Final   12/29/2021 07:21 AM 0.59 (L) 0.70 - 1.30 mg/dL Final      Creatinine Clearance Estimated Creatinine Clearance: 93.3 mL/min (A) (by C-G formula based on SCr of 0.61 mg/dL (L)). BUN Lab Results   Component Value Date/Time    BUN 4 (L) 12/30/2021 09:06 AM      WBC Lab Results   Component Value Date/Time    WBC 8.4 12/30/2021 09:06 AM      Temp Temp Readings from Last 1 Encounters:   12/30/21 98.8 °F (37.1 °C)      C-Reactive Protein C-Reactive protein   Date Value Ref Range Status   12/20/2021 6.44 (H) 0.00 - 0.60 mg/dL Final      Procalcitonin Lab Results   Component Value Date/Time    Procalcitonin <0.05 (H) 12/17/2021 11:30 AM        Last Level:   Vancomycin,trough   Date/Time Value Ref Range Status   12/30/2021 09:06 AM 2.0 (L) 5.0 - 10.0 ug/mL Final     Comment:        Trough levels of 15-20 ug/mL should be targeted for patients with coagulase negative Staphylococcus and MRSA pneumonia, endocarditis,osteomyelitis, meningitis, and bacteremia, as well as patients not responding to lower levels. Trough levels of 10-15 ug/mL for infections from other sources (e.g. urinary tract, cellulitis) are appropriate. All patients receiving concomitant nephrotoxic therapies should have their function closely monitored regardless of peak or trough levels. Ht Readings from Last 1 Encounters:   12/21/21 152.4 cm (60\")        Wt Readings from Last 1 Encounters:   12/17/21 59 kg (130 lb)     Ideal body weight: 50 kg (110 lb 3.7 oz)  Adjusted ideal body weight: 53.6 kg (118 lb 2.2 oz)     Previous Regimen: 1250mg IV x1 (12/29 @0234)    New Regimen:   Patient missed 2 doses so the level was low. Continue Vancomycin 1000mg IV q12h.     Pharmacy to follow daily and will make changes to dose and/or frequency based on clinical status.      _________________________________     Pharmacist Briana Paige

## 2021-12-30 NOTE — PROGRESS NOTES
Infectious Disease Progress Note           Subjective:   Pt seen and examined at bedside, Stable, no new complaints or acute events per staff. Objective:   Physical Exam:     Visit Vitals  /76 (BP 1 Location: Left arm, BP Patient Position: At rest)   Pulse 90   Temp 98.8 °F (37.1 °C)   Resp 18   Ht 5' (1.524 m)   Wt 130 lb (59 kg)   SpO2 93%   BMI 25.39 kg/m²    O2 Flow Rate (L/min): 2 l/min O2 Device: None (Room air)    Temp (24hrs), Av.8 °F (37.1 °C), Min:98.5 °F (36.9 °C), Max:99.1 °F (37.3 °C)    No intake/output data recorded. No intake/output data recorded.     General: NAD, alert, non-verbal   HEENT: ALEJO, Moist mucosa, decreased left sclera erythema  Lungs: CTA b/l, decreased at the bases, no wheeze/rhonchi   Heart: S1S2+, RRR, no murmur  Abdo: Soft, NT, ND, +BS, +Peg tube   Exts: Contracted exts, some leg swelling   Skin: No wounds, No rashes or lesions    Data Review:       Recent Days:  Recent Labs     21  0921  0738   WBC 8.4 9.5 9.0   HGB 10.9* 10.3* 10.6*   HCT 34.6* 33.2* 34.3*    288 315     Recent Labs     21  0906 21  0721  0738   BUN 4* 3*  3* 5*   CREA 0.61* 0.59*  0.61* 0.60*       Lab Results   Component Value Date/Time    C-Reactive protein 6.44 (H) 2021 05:00 AM        Microbiology     Results     Procedure Component Value Units Date/Time    CULTURE, BLOOD [[id number]] Collected: 21    Order Status: Completed Specimen: Blood Updated: 2146    CULTURE, BLOOD [[id number]] Collected: 21    Order Status: Completed Specimen: Blood Updated: 21 1147     Special Requests: No Special Requests        Culture result: No growth after 19 hours       MRSA SCREEN - PCR (NASAL) [[id number]] Collected: 21 1100    Order Status: Canceled Specimen: Swab     CULTURE, BLOOD [[id number]] Collected: 21 1100    Order Status: Completed Specimen: Blood Updated: 21 1039 Special Requests: No Special Requests        Culture result: No growth 6 days       CULTURE, SPUTUM/BRONCH/OTH [[id number]] Collected: 12/17/21 2115    Order Status: Canceled Specimen: Sputum     COVID-19 RAPID TEST [[id number]] Collected: 12/17/21 1620    Order Status: Completed Specimen: Nasopharyngeal Updated: 12/17/21 1740     Specimen source Nasopharyngeal        COVID-19 rapid test Not Detected        Comment: Rapid Abbott ID Now   Rapid NAAT:  The specimen is NEGATIVE for SARS-CoV-2, the novel coronavirus associated with COVID-19. Negative results should be treated as presumptive and, if inconsistent with clinical signs and symptoms or necessary for patient management, should be tested with an alternative molecular assay. Negative results do not preclude SARS-CoV-2 infection and should not be used as the sole basis for patient management decisions. This test has been authorized by the FDA under   an Emergency Use Authorization (EUA) for use by authorized laboratories. Fact sheet for Healthcare Providers: [employer]co.nz Fact sheet for Patients: [employer].co.nz   Methodology: Isothermal Nucleic Acid Amplification             Diagnostics   CXR Results  (Last 48 hours)               12/29/21 0027  XR CHEST PORT Final result    Impression:      Single portable AP view compared to December 23, 2021. Generator pack on the   left with electrode extending to the left side of the neck. Suboptimal   inspiratory film compromises visualization of the lower lung fields. Heart size normal. No mediastinal widening or shift. Reticular nodular pattern in the lungs persists and may be postinflammatory or   related to prior aspiration. No developing consolidation. Negative for pulmonary   edema, pleural effusion or pneumothorax. No acute fractures. Negative for subdiaphragmatic free air. Narrative:  Chest                    Assessment/Plan     1.  Aspiration pneumonitis, On RA, no increased oral secretions       Reticular nodular pattern in the lungs persists on CXR (12/29)      Afebrile today w a normal WBC on routine labs       On Vanc and Zosyn, both started on 12/28 by hospitalist       2. Dysphagia/poor oral intake: S/p peg tube placement on 12/27, on tube feeds       3. H/o seizure d/o, Controlled on anti-epileptics    4.  Acute left leg DVT, anticoagulated on Eliquis      Dispo: Awaiting placement for d/c     Venkat Forte MD    12/30/2021

## 2021-12-30 NOTE — PROGRESS NOTES
Hospitalist Progress Note    Subjective:   Daily Progress Note: 12/30/2021 8:46 AM    Hospital Course: The patient is a 51-year-old male with a PMH significant for intellectual disabilities, seizure disorder, quadriplegia. Lives in a group home. Caregivers state he had not been eating or drinking for 4 days or having bowel movements. Unsuccessful enema. Then he started vomiting several times. Brought to the emergency room he was hypertensive, tachycardic and altered from baseline. Chest x-ray consistent with aspiration pneumonia. He presents tachycardic and febrile with hypertension. Admitted for further management of sepsis, aspiration pneumonia, respiratory failure with hypoxia, breakthrough seizure activity. Blood pressure decreasing required ICU admission and septic shock and started on Levophed. Started on vancomycin and Zosyn for aspiration pneumonia. Required nasal cannula oxygen for acute hypoxic respiratory failure. Weaned off pressor therapy and transferred to medical floor for further management. Left leg Doppler study positive for left external iliac vein thrombosis, left common femoral vein thrombosis and left proximal femoral vein thrombosis. He was started on Eliquis. Decreased antibiotic to monotherapy IV Zosyn. Started on Ketotifen and prednisone eyedrop for left sclera edema. Due to high risk of aspiration secondary to chronic disabilities and anatomical swallowing weakness, inability to maintain hydration and nutrition status he would benefit from feeding tube placement. GI has been consulted, plan for PEG tube placement 12/27/2021. Heparin drip for bridge therapy while holding Eliquis. On the night of 12/28/2021 patient began vomiting tube feeds and was tachycardic/tachypneic. Concern for sepsis secondary aspiration pneumonia. Started back on IV vancomycin and Zosyn.   Repeat chest x-ray showed  No developing consolidation but does show persistent postinflammatory or related to prior aspiration reticular nodule pattern    Subjective: No acute issues overnight.     Current Facility-Administered Medications   Medication Dose Route Frequency    vancomycin (VANCOCIN) 1,000 mg in 0.9% sodium chloride 250 mL (VIAL-MATE)  1,000 mg IntraVENous Q12H    VANCOMYCIN INFORMATION NOTE   Other ONCE    VANCOMYCIN INFORMATION NOTE 1 Each  1 Each Other Rx Dosing/Monitoring    pantoprazole (PROTONIX) tablet 40 mg  40 mg Oral BID    sodium chloride (NS) flush 5-10 mL  5-10 mL IntraVENous PRN    piperacillin-tazobactam (ZOSYN) 3.375 g in 0.9% sodium chloride (MBP/ADV) 100 mL MBP  3.375 g IntraVENous Q8H    sodium chloride (NS) flush 5-40 mL  5-40 mL IntraVENous PRN    midodrine (PROAMATINE) tablet 5 mg  5 mg Oral TID WITH MEALS    prednisoLONE acetate (PRED FORTE) 1 % ophthalmic suspension 1 Drop  1 Drop Left Eye BID    apixaban (ELIQUIS) tablet 5 mg  5 mg Oral BID    0.9% sodium chloride infusion  75 mL/hr IntraVENous CONTINUOUS    levETIRAcetam in saline (iso-os) (KEPPRA) infusion 1,000 mg  1,000 mg IntraVENous BID    acetaminophen (TYLENOL) suppository 650 mg  650 mg Rectal Q4H PRN    ondansetron (ZOFRAN) injection 4 mg  4 mg IntraVENous Q6H PRN    phenytoin (DILANTIN) 100 mg/4 mL oral suspension 100 mg  100 mg Oral DAILY    phenytoin (DILANTIN) 100 mg/4 mL oral suspension 130 mg  130 mg Oral QHS    cetirizine (ZYRTEC) tablet 10 mg  10 mg Oral DAILY    folic acid (FOLVITE) tablet 1 mg  1 mg Oral DAILY    ketotifen (ZADITOR) 0.025 % (0.035 %) ophthalmic solution 1 Drop  1 Drop Both Eyes BID    lactulose (CHRONULAC) 10 gram/15 mL solution 15 mL  10 g Oral DAILY    lamoTRIgine (LaMICtal) tablet 300 mg  300 mg Oral BID    psyllium husk-aspartame (METAMUCIL FIBER) packet 1 Packet  1 Packet Oral DAILY    sodium chloride (NS) flush 5-40 mL  5-40 mL IntraVENous PRN    acetaminophen (TYLENOL) tablet 650 mg  650 mg Oral Q4H PRN    albuterol (PROVENTIL HFA, VENTOLIN HFA, PROAIR HFA) inhaler 2 Puff  2 Puff Inhalation Q4H PRN        Review of Systems  Co unable to assess due to mental status      Objective:     Visit Vitals  /76 (BP 1 Location: Left arm, BP Patient Position: At rest)   Pulse 90   Temp 98.8 °F (37.1 °C)   Resp 18   Ht 5' (1.524 m)   Wt 59 kg (130 lb)   SpO2 93%   BMI 25.39 kg/m²    O2 Flow Rate (L/min): 2 l/min O2 Device: None (Room air)    Temp (24hrs), Av °F (37.2 °C), Min:98.5 °F (36.9 °C), Max:99.5 °F (37.5 °C)      No intake/output data recorded. No intake/output data recorded. PHYSICAL EXAM:  Constitutional: No acute distress  Skin: Extremities and face reveal no rashes. HEENT: Sclerae anicteric. Extra-occular muscles are intact. Cardiovascular: Regular rate and rhythm. Respiratory:  Clear breath sounds bilaterally with no wheezes, rales, or rhonchi. GI: Abdomen nondistended, soft, and nontender. Normal active bowel sounds. Neurological:  Resting comfortably  Psychiatric: flat affect      Data Review    No results found for this or any previous visit (from the past 24 hour(s)). Radiology review: Chest xray    Assessment:   1. Acute hypoxic respiratory failure second aspiration pneumonia  2. Septic shock  3. Seizure disorder  4. Intellectual disability/quadriplegia  5. DVT  6. Hypertension    Plan:    1. Patient had completed a 10-day course of IV Zosyn. Infectious disease consulted. However on 2021  patient began vomiting tube feedings and there was a risk of aspiration. Placed back on IV vancomycin and Zosyn. Been afebrile. May consider transitioning to oral antibiotics if afebrile for 72 hours  2. Patient is off of pressors. 3.  Continue with Keppra 100 mg twice daily, Dilantin 100 mg daily and on at 30 mg at bedtime. Neurology consulted and has signed off  4. Due to risk of aspiration. GI consulted for PEG tube placement. Patient will need to be tolerating PEG tube feedings prior to discharge.   Per case management patient cannot return back to the group home. 5.  Venous Doppler positive for DVT. Patient is on Eliquis. 6.  Patient on midodrine 5 mg twice daily. Blood pressure is low but stable  7. CBC BMP in a.m. Dispo: Greater than 48 hours. Barriers include tolerating tube feeding and improving aspiration pneumonia. Per  may be difficult as patient's group home will not take him back. CODE STATUS full code     DVT prophylaxis: Eliquis  Ulcer prophylaxis: Protonix    Care Plan discussed with: Patient/Family, Nurse and     Total time spent with patient: 33 minutes.

## 2021-12-30 NOTE — PROGRESS NOTES
Progress Note    Patient: Savanah Argueta MRN: [id number]  SSN: [id number]    YOB: 1975  Age: 55 y.o. Sex: male      Admit Date: 12/17/2021    LOS: 13 days     Subjective:   GI in consultation for peg tube placement    Patient is alert but non-verbal. He is status post peg tube placement on 10/27/2021. He is tolerating peg tube feedings without difficulty. His Jevity is infusing at 45 ml/hr. No reports of any vomiting. Mother at the bedside. He is pending placement. Chest x-ray 12/29/2021: IMPRESSION   Single portable AP view compared to December 23, 2021. Generator pack on the left with electrode extending to the left side of the neck. Suboptimal  inspiratory film compromises visualization of the lower lung fields. Heart size normal. No mediastinal widening or shift. Reticular nodular pattern in the lungs persists and may be postinflammatory or related to prior aspiration. No developing consolidation. Negative for pulmonary edema, pleural effusion or pneumothorax. No acute fractures. Negative for subdiaphragmatic free air.     History of Present Illness: Ang Marks is a 55 y. o. male who is seen in consultation for peg tube placement. Mr. Yamel Choe has a history of intellectual disability and medical history obtained from patient's mother Vida Sommer and medical records. He does reside in a [address] [place] [address] and has been in the current home for less than a week per patient's mother. He is bedridden requiring total care. He has been on a soft diet prior to admission. Tanja Prasad was sent to the ED with complaints of vomiting and lethargy. He has had poor oral intake and reports of no bowel movements even after having an enema. The staff report he did vomit multiple times. His baseline function is a few word and able to follow simple commands. He does have bilateral contracted hands.  While in the ED he was found to have increased temperature, hypertension, and tachycardia meeting sepsis criteria.  His chest x-ray shows suspected aspiration pneumonia. He is on IV zosyn and followed by infectious Disease.  CT of the abdomen and pelvis shows nonspecific abnormalities but no significant intra-abdominal pathology. Haily Sandoval has a past medical history significant for Intellectual disability, seizure disorder, DVT in the past and was on xarelto 10 mg preventative. He was diagnosed on admission with acute thrombus present in the left external iliac vein, left common femoral vein, and left proximal femoral vein.  CTA of chest shows extensive bilateral reticulonodular infiltrated throughout both lungs. No evidence of Pulmonary Embolus. Spoke at length with patient's mother Stephon Carballo and discussed peg tube placement. All questions answered at this time. Patient's mother is in agreement with the peg tube. Discussed patient is currently on the Eliquis for a new diagnosis of DVT in the left leg will need to consult hematology for possible heparin drip prior to peg tube placement. Plan for possible placement on Monday based on hematology's recommendations. Objective:     Vitals:    12/29/21 1412 12/29/21 2030 12/30/21 0027 12/30/21 0751   BP: 105/69 101/64 (!) 103/54 100/76   Pulse: 80 95 90 90   Resp: 20 19 18 18   Temp: 99.1 °F (37.3 °C) 99.1 °F (37.3 °C) 98.5 °F (36.9 °C) 98.8 °F (37.1 °C)   SpO2: 97% 94% 95% 93%   Weight:       Height:            Intake and Output:  Current Shift: No intake/output data recorded. Last three shifts: No intake/output data recorded. Physical Exam:   Skin:  Extremities and face reveal no rashes. No mark erythema. HEENT: Sclerae anicteric. Extra-occular muscles are intact. No abnormal pigmentation of the lips. The neck is supple. Cardiovascular: Regular rate and rhythm. Respiratory:  Comfortable breathing with no accessory muscle use. GI:  Abdomen nondistended, soft, and nontender. No enlargement of the liver or spleen. No masses palpable.   Rectal:  Deferred  Musculoskeletal: Extremities have good range of motion. Neurological:  Gross memory appears intact. Patient is alert and oriented. Psychiatric:  Mood appears appropriate with judgement intact. Lymphatic:  No visible adenopathy      Lab/Data Review:  Recent Results (from the past 24 hour(s))   CBC WITH AUTOMATED DIFF    Collection Time: 12/30/21  9:06 AM   Result Value Ref Range    WBC 8.4 4.1 - 11.1 K/uL    RBC 3.57 (L) 4.10 - 5.70 M/uL    HGB 10.9 (L) 12.1 - 17.0 g/dL    HCT 34.6 (L) 36.6 - 50.3 %    MCV 96.9 80.0 - 99.0 FL    MCH 30.5 26.0 - 34.0 PG    MCHC 31.5 30.0 - 36.5 g/dL    RDW 13.6 11.5 - 14.5 %    PLATELET 044 853 - 865 K/uL    MPV 9.8 8.9 - 12.9 FL    NRBC 0.0 0.0  WBC    ABSOLUTE NRBC 0.00 0.00 - 0.01 K/uL    NEUTROPHILS 68 32 - 75 %    LYMPHOCYTES 16 12 - 49 %    MONOCYTES 7 5 - 13 %    EOSINOPHILS 9 (H) 0 - 7 %    BASOPHILS 0 0 - 1 %    IMMATURE GRANULOCYTES 0 0 - 0.5 %    ABS. NEUTROPHILS 5.7 1.8 - 8.0 K/UL    ABS. LYMPHOCYTES 1.3 0.8 - 3.5 K/UL    ABS. MONOCYTES 0.6 0.0 - 1.0 K/UL    ABS. EOSINOPHILS 0.8 (H) 0.0 - 0.4 K/UL    ABS. BASOPHILS 0.0 0.0 - 0.1 K/UL    ABS. IMM. GRANS. 0.0 0.00 - 0.04 K/UL    DF AUTOMATED     METABOLIC PANEL, BASIC    Collection Time: 12/30/21  9:06 AM   Result Value Ref Range    Sodium 139 136 - 145 mmol/L    Potassium 3.8 3.5 - 5.1 mmol/L    Chloride 106 97 - 108 mmol/L    CO2 26 21 - 32 mmol/L    Anion gap 7 5 - 15 mmol/L    Glucose 115 (H) 65 - 100 mg/dL    BUN 4 (L) 6 - 20 mg/dL    Creatinine 0.61 (L) 0.70 - 1.30 mg/dL    BUN/Creatinine ratio 7 (L) 12 - 20      GFR est AA >60 >60 ml/min/1.73m2    GFR est non-AA >60 >60 ml/min/1.73m2    Calcium 8.8 8.5 - 10.1 mg/dL   Shaila Contreras    Collection Time: 12/30/21  9:06 AM   Result Value Ref Range    Vancomycin,trough 2.0 (L) 5.0 - 10.0 ug/mL    Reported dose date Not provided      Reported dose: Not provided Units              XR CHEST PORT   Final Result      Single portable AP view compared to December 23, 2021.  Generator pack on the   left with electrode extending to the left side of the neck. Suboptimal   inspiratory film compromises visualization of the lower lung fields. Heart size normal. No mediastinal widening or shift. Reticular nodular pattern in the lungs persists and may be postinflammatory or   related to prior aspiration. No developing consolidation. Negative for pulmonary   edema, pleural effusion or pneumothorax. No acute fractures. Negative for subdiaphragmatic free air. XR CHEST PORT   Final Result   1. Nasogastric tube tip overlies the right upper quadrant, likely the gastric   antrum or duodenal bulb. 2. Persistent reticulonodular opacities in both lungs. CTA CHEST W OR W WO CONT   Final Result   1. Extensive bilateral reticulonodular infiltrates throughout both lungs similar   to the previous study although both lungs were not included in their entirety on   the previous study. Findings may represent infectious, inflammatory or   neoplastic process. Recommend follow-up to. No evidence of pulmonary embolus,   aortic aneurysm or aortic dissection. 3. Borderline enlarged gastrohepatic lymph node. 4. Thickened esophagus, recommend clinical evaluation. DUPLEX LOWER EXT VENOUS BILAT   Final Result      XR CHEST PORT   Final Result   No significant interval change. CT ABD PELV W CONT   Final Result      1. Small focus of intraluminal gas within the urinary bladder. Please correlate   for recent instrumentation or other causes of intraluminal gas, including an   infectious process. 2. Somewhat reticular nodular opacities in the lung bases, suboptimally   evaluated due to respiratory motion. Some of these have what appears to be a   tree and bud morphology, suggesting either chronic aspiration or an infectious   or inflammatory small airways disease. Otherwise a fibrotic process should be   considered. This would be better characterized with dedicated chest CT as   clinically warranted.    3. Underdistention versus circumferential wall thickening of the distal   esophagus. 4. Probable left trochanteric bursitis with sterility of the fluid   indeterminate. 5. Probable myositis ossificans surrounding the left hip. Please correlate for   history of prior trauma. 6. Enlarged lymph nodes in the upper abdomen, nonspecific. Comparison with   outside imaging would be helpful, if available, to determine longer term   stability. Otherwise short interval follow-up in 3 months is recommended. XR CHEST SNGL V   Final Result   Patchy opacities within the lungs, nonspecific, but leading differential   considerations include infection or pulmonary edema. Superimposed acute disease   on an underlying chronic interstitial process is also possible. Radiographic   follow-up is recommended to exclude other causes of opacification. XR ABD (KUB)   Final Result   Mildly prominent gas-filled loops of bowel within the central abdomen with   overall nonobstructive bowel gas pattern. Assessment:     Active Problems:    Sepsis ([address] (12/17/2021)      Aspiration pneumonia ([address] (12/17/2021)      Respiratory failure with hypoxia ([address] (12/21/2021)      DVT (deep venous thrombosis) ([address] (12/23/2021)        Plan:   1. Dysphagia       S/P  peg tube placement Monday 12/27/21      Feeding per nutritionist recommendation      Hematology input appreciated      Bolster mild compression to skin wall,       clean wound wth peroxide and water       apply tripple antibiotic cream twice a day  2. Sepsis 2/2 aspiration pneumonia      Infectious Disease input appreciated      Continue IV zosyn      Re-started on IV Vancomycin      Currently afebrile        Currently on 2 liters oxygen via nasal cannula  3. Seizure disorder      Neurology input appreciated      Continue home medications  4. Acute Left leg DVT     Eliquis re-started      Hematology input appreciated    GI signing off. Please re-consult if needed.     Thank you for allowing me to participate in this patients care. Plan discussed with  and he approves.     Signed By: Bryan Campos NP     December 30, 2021

## 2021-12-31 LAB
ANION GAP SERPL CALC-SCNC: 6 MMOL/L (ref 5–15)
BASOPHILS # BLD: 0 K/UL (ref 0–0.1)
BASOPHILS NFR BLD: 0 % (ref 0–1)
BUN SERPL-MCNC: 4 MG/DL (ref 6–20)
BUN/CREAT SERPL: 6 (ref 12–20)
CA-I BLD-MCNC: 9 MG/DL (ref 8.5–10.1)
CHLORIDE SERPL-SCNC: 105 MMOL/L (ref 97–108)
CO2 SERPL-SCNC: 26 MMOL/L (ref 21–32)
CREAT SERPL-MCNC: 0.7 MG/DL (ref 0.7–1.3)
DIFFERENTIAL METHOD BLD: ABNORMAL
EOSINOPHIL # BLD: 0.5 K/UL (ref 0–0.4)
EOSINOPHIL NFR BLD: 6 % (ref 0–7)
ERYTHROCYTE [DISTWIDTH] IN BLOOD BY AUTOMATED COUNT: 13.7 % (ref 11.5–14.5)
GLUCOSE SERPL-MCNC: 132 MG/DL (ref 65–100)
HCT VFR BLD AUTO: 34.7 % (ref 36.6–50.3)
HGB BLD-MCNC: 11 G/DL (ref 12.1–17)
IMM GRANULOCYTES # BLD AUTO: 0 K/UL (ref 0–0.04)
IMM GRANULOCYTES NFR BLD AUTO: 0 % (ref 0–0.5)
LYMPHOCYTES # BLD: 1.2 K/UL (ref 0.8–3.5)
LYMPHOCYTES NFR BLD: 14 % (ref 12–49)
MCH RBC QN AUTO: 30.4 PG (ref 26–34)
MCHC RBC AUTO-ENTMCNC: 31.7 G/DL (ref 30–36.5)
MCV RBC AUTO: 95.9 FL (ref 80–99)
MONOCYTES # BLD: 0.7 K/UL (ref 0–1)
MONOCYTES NFR BLD: 8 % (ref 5–13)
NEUTS SEG # BLD: 6.2 K/UL (ref 1.8–8)
NEUTS SEG NFR BLD: 72 % (ref 32–75)
NRBC # BLD: 0 K/UL (ref 0–0.01)
NRBC BLD-RTO: 0 PER 100 WBC
PLATELET # BLD AUTO: 337 K/UL (ref 150–400)
PMV BLD AUTO: 9.4 FL (ref 8.9–12.9)
POTASSIUM SERPL-SCNC: 3.5 MMOL/L (ref 3.5–5.1)
RBC # BLD AUTO: 3.62 M/UL (ref 4.1–5.7)
SODIUM SERPL-SCNC: 137 MMOL/L (ref 136–145)
VANCOMYCIN SERPL-MCNC: 11.1 UG/ML
WBC # BLD AUTO: 8.7 K/UL (ref 4.1–11.1)

## 2021-12-31 PROCEDURE — 80048 BASIC METABOLIC PNL TOTAL CA: CPT

## 2021-12-31 PROCEDURE — 74011250636 HC RX REV CODE- 250/636: Performed by: HOSPITALIST

## 2021-12-31 PROCEDURE — 65270000029 HC RM PRIVATE

## 2021-12-31 PROCEDURE — 74011250637 HC RX REV CODE- 250/637: Performed by: HOSPITALIST

## 2021-12-31 PROCEDURE — 74011250637 HC RX REV CODE- 250/637: Performed by: NURSE PRACTITIONER

## 2021-12-31 PROCEDURE — 74011250637 HC RX REV CODE- 250/637: Performed by: INTERNAL MEDICINE

## 2021-12-31 PROCEDURE — 74011000258 HC RX REV CODE- 258: Performed by: HOSPITALIST

## 2021-12-31 PROCEDURE — 36415 COLL VENOUS BLD VENIPUNCTURE: CPT

## 2021-12-31 PROCEDURE — 99232 SBSQ HOSP IP/OBS MODERATE 35: CPT | Performed by: INTERNAL MEDICINE

## 2021-12-31 PROCEDURE — 80202 ASSAY OF VANCOMYCIN: CPT

## 2021-12-31 PROCEDURE — 74011250637 HC RX REV CODE- 250/637: Performed by: PHYSICIAN ASSISTANT

## 2021-12-31 PROCEDURE — 85025 COMPLETE CBC W/AUTO DIFF WBC: CPT

## 2021-12-31 RX ORDER — AMOXICILLIN AND CLAVULANATE POTASSIUM 875; 125 MG/1; MG/1
1 TABLET, FILM COATED ORAL EVERY 12 HOURS
Status: DISPENSED | OUTPATIENT
Start: 2021-12-31 | End: 2022-01-07

## 2021-12-31 RX ADMIN — ERYTHROMYCIN: 5 OINTMENT OPHTHALMIC at 14:29

## 2021-12-31 RX ADMIN — VANCOMYCIN HYDROCHLORIDE 1000 MG: 1 INJECTION, POWDER, LYOPHILIZED, FOR SOLUTION INTRAVENOUS at 02:23

## 2021-12-31 RX ADMIN — KETOTIFEN FUMARATE 1 DROP: 0.35 SOLUTION/ DROPS OPHTHALMIC at 21:00

## 2021-12-31 RX ADMIN — FOLIC ACID 1 MG: 1 TABLET ORAL at 10:16

## 2021-12-31 RX ADMIN — MIDODRINE HYDROCHLORIDE 5 MG: 5 TABLET ORAL at 14:29

## 2021-12-31 RX ADMIN — PREDNISOLONE ACETATE 1 DROP: 10 SUSPENSION/ DROPS OPHTHALMIC at 10:17

## 2021-12-31 RX ADMIN — MIDODRINE HYDROCHLORIDE 5 MG: 5 TABLET ORAL at 17:52

## 2021-12-31 RX ADMIN — CETIRIZINE HYDROCHLORIDE 10 MG: 10 TABLET, FILM COATED ORAL at 10:16

## 2021-12-31 RX ADMIN — ERYTHROMYCIN: 5 OINTMENT OPHTHALMIC at 22:00

## 2021-12-31 RX ADMIN — AMOXICILLIN AND CLAVULANATE POTASSIUM 1 TABLET: 875; 125 TABLET, FILM COATED ORAL at 21:00

## 2021-12-31 RX ADMIN — ERYTHROMYCIN: 5 OINTMENT OPHTHALMIC at 06:12

## 2021-12-31 RX ADMIN — MIDODRINE HYDROCHLORIDE 5 MG: 5 TABLET ORAL at 10:16

## 2021-12-31 RX ADMIN — KETOTIFEN FUMARATE 1 DROP: 0.35 SOLUTION/ DROPS OPHTHALMIC at 10:17

## 2021-12-31 RX ADMIN — LACTULOSE 15 ML: 10 SOLUTION ORAL at 10:15

## 2021-12-31 RX ADMIN — PANTOPRAZOLE SODIUM 40 MG: 40 TABLET, DELAYED RELEASE ORAL at 10:16

## 2021-12-31 RX ADMIN — LAMOTRIGINE 300 MG: 100 TABLET ORAL at 10:16

## 2021-12-31 RX ADMIN — PIPERACILLIN SODIUM AND TAZOBACTAM SODIUM 3.38 G: 3; .375 INJECTION, POWDER, LYOPHILIZED, FOR SOLUTION INTRAVENOUS at 02:23

## 2021-12-31 RX ADMIN — PIPERACILLIN SODIUM AND TAZOBACTAM SODIUM 3.38 G: 3; .375 INJECTION, POWDER, LYOPHILIZED, FOR SOLUTION INTRAVENOUS at 10:15

## 2021-12-31 RX ADMIN — PSYLLIUM HUSK 1 PACKET: 3.4 POWDER ORAL at 10:16

## 2021-12-31 RX ADMIN — PHENYTOIN 100 MG: 125 SUSPENSION ORAL at 10:16

## 2021-12-31 RX ADMIN — VANCOMYCIN HYDROCHLORIDE 1000 MG: 1 INJECTION, POWDER, LYOPHILIZED, FOR SOLUTION INTRAVENOUS at 14:29

## 2021-12-31 RX ADMIN — LEVETIRACETAM 1000 MG: 10 INJECTION, SOLUTION INTRAVENOUS at 10:15

## 2021-12-31 RX ADMIN — APIXABAN 5 MG: 5 TABLET, FILM COATED ORAL at 10:16

## 2021-12-31 RX ADMIN — PREDNISOLONE ACETATE 1 DROP: 10 SUSPENSION/ DROPS OPHTHALMIC at 21:00

## 2021-12-31 NOTE — PROGRESS NOTES
Infectious Disease Progress Note           Subjective:   O2 sats dropped to the 80s earlier today, restarted on Vanc and Zosyn by hospitalist. Clear lungs on exam   Objective:   Physical Exam:     Visit Vitals  BP (!) 109/59 (BP 1 Location: Left upper arm, BP Patient Position: At rest)   Pulse 92   Temp 98.3 °F (36.8 °C)   Resp 18   Ht 5' (1.524 m)   Wt 130 lb (59 kg)   SpO2 (!) 88%   BMI 25.39 kg/m²    O2 Flow Rate (L/min): 2 l/min O2 Device: None (Room air)    Temp (24hrs), Av.5 °F (36.9 °C), Min:98.3 °F (36.8 °C), Max:98.9 °F (37.2 °C)    No intake/output data recorded.     190 -  0700  In: 120   Out: 1800 [Urine:1800]    General: NAD, alert, non-verbal   HEENT: ALEJO, Moist mucosa, decreased left sclera erythema  Lungs: CTA b/l, decreased at the bases, no wheeze/rhonchi   Heart: S1S2+, RRR, no murmur  Abdo: Soft, NT, ND, +BS, +Peg tube   Exts: Contracted exts, some leg swelling   Skin: No wounds, No rashes or lesions    Data Review:       Recent Days:  Recent Labs     21  0802 21  0906 21  0721   WBC 8.7 8.4 9.5   HGB 11.0* 10.9* 10.3*   HCT 34.7* 34.6* 33.2*    336 288     Recent Labs     21  0802 21  0906 21  0721   BUN 4* 4* 3*  3*   CREA 0.70 0.61* 0.59*  0.61*       Lab Results   Component Value Date/Time    C-Reactive protein 6.44 (H) 2021 05:00 AM        Microbiology     Results     Procedure Component Value Units Date/Time    CULTURE, BLOOD [[id number]] Collected: 21    Order Status: Completed Specimen: Blood Updated: 21 1501     Special Requests: --        No Special Requests  Left  Hand       Culture result: No growth 1 day       CULTURE, BLOOD [[id number]] Collected: 21 0720    Order Status: Completed Specimen: Blood Updated: 2148     Special Requests: No Special Requests        Culture result: No growth 2 days       MRSA SCREEN - PCR (NASAL) [[id number]] Collected: 21 1100    Order Status: Canceled Specimen: Swab     CULTURE, BLOOD [[id number]] Collected: 12/18/21 1100    Order Status: Completed Specimen: Blood Updated: 12/24/21 1039     Special Requests: No Special Requests        Culture result: No growth 6 days       CULTURE, SPUTUM/BRONCH/OTH [[id number]] Collected: 12/17/21 2115    Order Status: Canceled Specimen: Sputum     COVID-19 RAPID TEST [[id number]] Collected: 12/17/21 1620    Order Status: Completed Specimen: Nasopharyngeal Updated: 12/17/21 1740     Specimen source Nasopharyngeal        COVID-19 rapid test Not Detected        Comment: Rapid Abbott ID Now   Rapid NAAT:  The specimen is NEGATIVE for SARS-CoV-2, the novel coronavirus associated with COVID-19. Negative results should be treated as presumptive and, if inconsistent with clinical signs and symptoms or necessary for patient management, should be tested with an alternative molecular assay. Negative results do not preclude SARS-CoV-2 infection and should not be used as the sole basis for patient management decisions. This test has been authorized by the FDA under   an Emergency Use Authorization (EUA) for use by authorized laboratories. Fact sheet for Healthcare Providers: ConventionUpdate.co[employer]nz Fact sheet for Patients: ConventionUpdate[employer]   Methodology: Isothermal Nucleic Acid Amplification             Diagnostics   CXR Results  (Last 48 hours)    None             Assessment/Plan     1. Aspiration pneumonitis, H/o dysphagia w chronic aspiration      Remains afebrile, hypoxic w O2 sats in the 80s       WBC WNLs. D/c Vanc and Zosyn. Start on Augmentin for continued aspiration PNA coverage       Routine labs in the morning. O2 supplementation as needed       On going risk factors for aspiration, not taking deep breaths       2. Dysphagia/poor oral intake: S/p peg tube placement on 12/27, on tube feeds       3. H/o seizure d/o, Controlled on anti-epileptics    4.  Acute left leg DVT, anticoagulated on Elidionte Vasquez MD    12/31/2021

## 2021-12-31 NOTE — PROGRESS NOTES
Consult for Vancomycin Dosing by Pharmacy by Dr. Thor Pagan provided for this 55y.o. year old male , for indication of PNA. Day of Therapy: 4  Goal of Level(s): (AUC = 400-600) or (trough = 10-15mcg/dL)     Other Current Antibiotics: Zosyn    New Regimen:     Vancomycin level resulted 11.1 mg/L. Will continue Vancomycin 1000 mg every 12 hours dose regimen adjusting next dose to be given at 1500 and schedule a trough at 1300 on 1/2/21. Projected trough ~13 mg/L and AUC = 400-500. Pharmacy to follow daily and will make changes to dose and/or frequency based on clinical status.      _________________________________     Pharmacist Ro Padilla, PHARMD

## 2021-12-31 NOTE — PROGRESS NOTES
Hospitalist Progress Note    Subjective:   Daily Progress Note: 12/31/2021 10:20 AM    Patient resting comfortably in bed. Patient nonverbal at baseline. Nursing notes reviewed no acute events overnight. However 2 nights ago patient was vomiting and became tachycardic and tachypneic. Of note patient's O2 sats dropped to the 80s earlier today but has clear lungs on examination and is now saturating at 94%.     Current Facility-Administered Medications   Medication Dose Route Frequency    erythromycin (ILOTYCIN) 5 mg/gram (0.5 %) ophthalmic ointment   Left Eye Q8H    vancomycin (VANCOCIN) 1,000 mg in 0.9% sodium chloride 250 mL (VIAL-MATE)  1,000 mg IntraVENous Q12H    VANCOMYCIN INFORMATION NOTE 1 Each  1 Each Other Rx Dosing/Monitoring    pantoprazole (PROTONIX) tablet 40 mg  40 mg Oral BID    sodium chloride (NS) flush 5-10 mL  5-10 mL IntraVENous PRN    piperacillin-tazobactam (ZOSYN) 3.375 g in 0.9% sodium chloride (MBP/ADV) 100 mL MBP  3.375 g IntraVENous Q8H    sodium chloride (NS) flush 5-40 mL  5-40 mL IntraVENous PRN    midodrine (PROAMATINE) tablet 5 mg  5 mg Oral TID WITH MEALS    prednisoLONE acetate (PRED FORTE) 1 % ophthalmic suspension 1 Drop  1 Drop Left Eye BID    apixaban (ELIQUIS) tablet 5 mg  5 mg Oral BID    0.9% sodium chloride infusion  75 mL/hr IntraVENous CONTINUOUS    levETIRAcetam in saline (iso-os) (KEPPRA) infusion 1,000 mg  1,000 mg IntraVENous BID    acetaminophen (TYLENOL) suppository 650 mg  650 mg Rectal Q4H PRN    ondansetron (ZOFRAN) injection 4 mg  4 mg IntraVENous Q6H PRN    phenytoin (DILANTIN) 100 mg/4 mL oral suspension 100 mg  100 mg Oral DAILY    phenytoin (DILANTIN) 100 mg/4 mL oral suspension 130 mg  130 mg Oral QHS    cetirizine (ZYRTEC) tablet 10 mg  10 mg Oral DAILY    folic acid (FOLVITE) tablet 1 mg  1 mg Oral DAILY    ketotifen (ZADITOR) 0.025 % (0.035 %) ophthalmic solution 1 Drop  1 Drop Both Eyes BID    lactulose (CHRONULAC) 10 gram/15 mL solution 15 mL  10 g Oral DAILY    lamoTRIgine (LaMICtal) tablet 300 mg  300 mg Oral BID    psyllium husk-aspartame (METAMUCIL FIBER) packet 1 Packet  1 Packet Oral DAILY    sodium chloride (NS) flush 5-40 mL  5-40 mL IntraVENous PRN    acetaminophen (TYLENOL) tablet 650 mg  650 mg Oral Q4H PRN    albuterol (PROVENTIL HFA, VENTOLIN HFA, PROAIR HFA) inhaler 2 Puff  2 Puff Inhalation Q4H PRN        Review of Systems  Review of Systems   Unable to perform ROS: Acuity of condition            Objective:     Visit Vitals  BP (!) 109/59 (BP 1 Location: Left upper arm, BP Patient Position: At rest)   Pulse 92   Temp 98.3 °F (36.8 °C)   Resp 18   Ht 5' (1.524 m)   Wt 59 kg (130 lb)   SpO2 (!) 88%   BMI 25.39 kg/m²    O2 Flow Rate (L/min): 2 l/min O2 Device: None (Room air)    Temp (24hrs), Av.5 °F (36.9 °C), Min:98.3 °F (36.8 °C), Max:98.9 °F (37.2 °C)      No intake/output data recorded.    1901 -  0700  In: 120   Out: 1800 [Urine:1800]    Recent Results (from the past 24 hour(s))   METABOLIC PANEL, BASIC    Collection Time: 21  8:02 AM   Result Value Ref Range    Sodium 137 136 - 145 mmol/L    Potassium 3.5 3.5 - 5.1 mmol/L    Chloride 105 97 - 108 mmol/L    CO2 26 21 - 32 mmol/L    Anion gap 6 5 - 15 mmol/L    Glucose 132 (H) 65 - 100 mg/dL    BUN 4 (L) 6 - 20 mg/dL    Creatinine 0.70 0.70 - 1.30 mg/dL    BUN/Creatinine ratio 6 (L) 12 - 20      GFR est AA >60 >60 ml/min/1.73m2    GFR est non-AA >60 >60 ml/min/1.73m2    Calcium 9.0 8.5 - 10.1 mg/dL   CBC WITH AUTOMATED DIFF    Collection Time: 21  8:02 AM   Result Value Ref Range    WBC 8.7 4.1 - 11.1 K/uL    RBC 3.62 (L) 4.10 - 5.70 M/uL    HGB 11.0 (L) 12.1 - 17.0 g/dL    HCT 34.7 (L) 36.6 - 50.3 %    MCV 95.9 80.0 - 99.0 FL    MCH 30.4 26.0 - 34.0 PG    MCHC 31.7 30.0 - 36.5 g/dL    RDW 13.7 11.5 - 14.5 %    PLATELET 474 630 - 195 K/uL    MPV 9.4 8.9 - 12.9 FL    NRBC 0.0 0.0  WBC    ABSOLUTE NRBC 0.00 0.00 - 0.01 K/uL    NEUTROPHILS 72 32 - 75 %    LYMPHOCYTES 14 12 - 49 %    MONOCYTES 8 5 - 13 %    EOSINOPHILS 6 0 - 7 %    BASOPHILS 0 0 - 1 %    IMMATURE GRANULOCYTES 0 0 - 0.5 %    ABS. NEUTROPHILS 6.2 1.8 - 8.0 K/UL    ABS. LYMPHOCYTES 1.2 0.8 - 3.5 K/UL    ABS. MONOCYTES 0.7 0.0 - 1.0 K/UL    ABS. EOSINOPHILS 0.5 (H) 0.0 - 0.4 K/UL    ABS. BASOPHILS 0.0 0.0 - 0.1 K/UL    ABS. IMM. GRANS. 0.0 0.00 - 0.04 K/UL    DF AUTOMATED          XR CHEST PORT   Final Result      Single portable AP view compared to December 23, 2021. Generator pack on the   left with electrode extending to the left side of the neck. Suboptimal   inspiratory film compromises visualization of the lower lung fields. Heart size normal. No mediastinal widening or shift. Reticular nodular pattern in the lungs persists and may be postinflammatory or   related to prior aspiration. No developing consolidation. Negative for pulmonary   edema, pleural effusion or pneumothorax. No acute fractures. Negative for subdiaphragmatic free air. XR CHEST PORT   Final Result   1. Nasogastric tube tip overlies the right upper quadrant, likely the gastric   antrum or duodenal bulb. 2. Persistent reticulonodular opacities in both lungs. CTA CHEST W OR W WO CONT   Final Result   1. Extensive bilateral reticulonodular infiltrates throughout both lungs similar   to the previous study although both lungs were not included in their entirety on   the previous study. Findings may represent infectious, inflammatory or   neoplastic process. Recommend follow-up to. No evidence of pulmonary embolus,   aortic aneurysm or aortic dissection. 3. Borderline enlarged gastrohepatic lymph node. 4. Thickened esophagus, recommend clinical evaluation. DUPLEX LOWER EXT VENOUS BILAT   Final Result      XR CHEST PORT   Final Result   No significant interval change. CT ABD PELV W CONT   Final Result      1. Small focus of intraluminal gas within the urinary bladder.  Please correlate   for recent instrumentation or other causes of intraluminal gas, including an   infectious process. 2. Somewhat reticular nodular opacities in the lung bases, suboptimally   evaluated due to respiratory motion. Some of these have what appears to be a   tree and bud morphology, suggesting either chronic aspiration or an infectious   or inflammatory small airways disease. Otherwise a fibrotic process should be   considered. This would be better characterized with dedicated chest CT as   clinically warranted. 3. Underdistention versus circumferential wall thickening of the distal   esophagus. 4. Probable left trochanteric bursitis with sterility of the fluid   indeterminate. 5. Probable myositis ossificans surrounding the left hip. Please correlate for   history of prior trauma. 6. Enlarged lymph nodes in the upper abdomen, nonspecific. Comparison with   outside imaging would be helpful, if available, to determine longer term   stability. Otherwise short interval follow-up in 3 months is recommended. XR CHEST SNGL V   Final Result   Patchy opacities within the lungs, nonspecific, but leading differential   considerations include infection or pulmonary edema. Superimposed acute disease   on an underlying chronic interstitial process is also possible. Radiographic   follow-up is recommended to exclude other causes of opacification. XR ABD (KUB)   Final Result   Mildly prominent gas-filled loops of bowel within the central abdomen with   overall nonobstructive bowel gas pattern. PHYSICAL EXAM:    Physical Exam  Vitals and nursing note reviewed. Constitutional:       Appearance: He is ill-appearing. HENT:      Head: Normocephalic and atraumatic. Cardiovascular:      Rate and Rhythm: Normal rate and regular rhythm. Pulmonary:      Effort: No respiratory distress. Breath sounds: No wheezing. Abdominal:      General: Bowel sounds are normal. There is no distension.       Tenderness: There is no abdominal tenderness. Comments: PEG tube in place   Genitourinary:     Comments: Weinberg present  Musculoskeletal:         General: No tenderness. Right lower leg: No edema. Left lower leg: No edema. Comments: Upper extremities:  Left: adducted and flexed against chest   Right: abducted and flexed against chest    Lower extremities: bilaterally internally rotated and flexed   Skin:     General: Skin is warm. Capillary Refill: Capillary refill takes less than 2 seconds. Neurological:      Mental Status: He is alert. He is disoriented. Comments: Nonverbal at baseline   Psychiatric:      Comments: Unable to assess due to mental acuity        Data Review    Recent Results (from the past 24 hour(s))   METABOLIC PANEL, BASIC    Collection Time: 12/31/21  8:02 AM   Result Value Ref Range    Sodium 137 136 - 145 mmol/L    Potassium 3.5 3.5 - 5.1 mmol/L    Chloride 105 97 - 108 mmol/L    CO2 26 21 - 32 mmol/L    Anion gap 6 5 - 15 mmol/L    Glucose 132 (H) 65 - 100 mg/dL    BUN 4 (L) 6 - 20 mg/dL    Creatinine 0.70 0.70 - 1.30 mg/dL    BUN/Creatinine ratio 6 (L) 12 - 20      GFR est AA >60 >60 ml/min/1.73m2    GFR est non-AA >60 >60 ml/min/1.73m2    Calcium 9.0 8.5 - 10.1 mg/dL   CBC WITH AUTOMATED DIFF    Collection Time: 12/31/21  8:02 AM   Result Value Ref Range    WBC 8.7 4.1 - 11.1 K/uL    RBC 3.62 (L) 4.10 - 5.70 M/uL    HGB 11.0 (L) 12.1 - 17.0 g/dL    HCT 34.7 (L) 36.6 - 50.3 %    MCV 95.9 80.0 - 99.0 FL    MCH 30.4 26.0 - 34.0 PG    MCHC 31.7 30.0 - 36.5 g/dL    RDW 13.7 11.5 - 14.5 %    PLATELET 714 529 - 778 K/uL    MPV 9.4 8.9 - 12.9 FL    NRBC 0.0 0.0  WBC    ABSOLUTE NRBC 0.00 0.00 - 0.01 K/uL    NEUTROPHILS 72 32 - 75 %    LYMPHOCYTES 14 12 - 49 %    MONOCYTES 8 5 - 13 %    EOSINOPHILS 6 0 - 7 %    BASOPHILS 0 0 - 1 %    IMMATURE GRANULOCYTES 0 0 - 0.5 %    ABS. NEUTROPHILS 6.2 1.8 - 8.0 K/UL    ABS. LYMPHOCYTES 1.2 0.8 - 3.5 K/UL    ABS.  MONOCYTES 0.7 0.0 - 1.0 K/UL    ABS. EOSINOPHILS 0.5 (H) 0.0 - 0.4 K/UL    ABS. BASOPHILS 0.0 0.0 - 0.1 K/UL    ABS. IMM. GRANS. 0.0 0.00 - 0.04 K/UL    DF AUTOMATED          Assessment/Plan:     Active Problems:    Sepsis ([facility] [address] (12/17/2021)      Aspiration pneumonia ([facility] [address] (12/17/2021)      Respiratory failure with hypoxia ([facility] [address] (12/21/2021)      DVT (deep venous thrombosis) ([facility] [address] (12/23/2021)        Hospital Course:    Mari Izaguirre is a 27-year-old male with a PMH significant for intellectual disabilities, seizure disorder, quadriplegia who lives in a group home who presented with a 4 day history of anorexia, constipation s/p enema, and vomiting. In emergency room he was hypertensive, febrile, tachycardic and altered from baseline. Chest x-ray consistent with aspiration pneumonia. CT abd/pelvis Admitted for further management of sepsis, aspiration pneumonia, respiratory failure with hypoxia, and breakthrough seizure activity. Blood pressure decreasing required ICU admission and septic shock and started on Levophed. Started on vancomycin and Zosyn for aspiration pneumonia. Required nasal cannula oxygen for acute hypoxic respiratory failure. Weaned off pressor therapy and transferred to medical floor for further management. Doppler study positive for left external iliac vein thrombosis, left common femoral vein thrombosis and left proximal femoral vein thrombosis. He was started on Eliquis. Decreased antibiotic to monotherapy IV Zosyn. Started on Ketotifen and prednisone eyedrop for left sclera edema. Due to high risk of aspiration secondary to chronic disabilities and anatomical swallowing weakness, inability to maintain hydration and nutrition status he would benefit from feeding tube placement. GI has been consulted. PEG tube placed on 12/27/21. Heparin drip for bridge therapy while holding Eliquis. On the night of 12/28/2021 patient began vomiting tube feeds and was tachycardic/tachypneic.  Concern for sepsis secondary aspiration pneumonia. Started back on IV vancomycin and Zosyn. Repeat chest x-ray showed no developing consolidation but does show persistent postinflammatory or related to prior aspiration reticular nodule pattern. Patient did not have a white count or elevated inflammatory markers. Patient was initially in the 80s saturating but is now normal. Vancomycin and Zosyn discontinued and started on Augmentin for 14 doses. Acute hypoxic respiratory failure secondary to aspiration pneumonia  Septic shock - resolved off pressors  S/p zosyn x10 days however on 12/28/2021 2/2 vomiting patient with suspected aspiration and restarted on IV Vanco and Zosyn. This was discontinued and started on Augmentin for 14 doses  Strict aspiration precautions in place  12/18 blood: negative, final  12/29 blood: NGTD, prelim  ID following    Seizure disorder  Continue with keppra, lamictal, and dilantin 100mg daily and 130 mg at bedtime    Neurology consulted and has signed off    Intellectual disability/quadriplegia  History of dysphagia  PEG tube placed on 12/27  Per case management patient cannot return back to the group home  GI/nutrition/ST following     DVT  Venous doppler positive for DVT  Continue on Eliquis    Hypertension  Continue on midodrine 5 mg twice daily    Left sclera edema  Continue on ketotifen and prednisolone drops    DVT Prophylaxis: eliquis  GI Prophylaxis: protonix  Discharge and disposition barriers: Greater than 48 hours. Barriers include tolerating tube feeding and improving aspiration pneumonia. Per  may be difficult as patient's group home will not take him back. Care Plan discussed with: Patient/Family, Nurse and     Total time spent with patient: 35 minutes.

## 2022-01-01 LAB
ALBUMIN SERPL-MCNC: 2.9 G/DL (ref 3.5–5)
ALBUMIN/GLOB SERPL: 0.7 {RATIO} (ref 1.1–2.2)
ALP SERPL-CCNC: 177 U/L (ref 45–117)
ALT SERPL-CCNC: 27 U/L (ref 12–78)
ANION GAP SERPL CALC-SCNC: 9 MMOL/L (ref 5–15)
AST SERPL W P-5'-P-CCNC: 24 U/L (ref 15–37)
BASOPHILS # BLD: 0 K/UL (ref 0–0.1)
BASOPHILS NFR BLD: 0 % (ref 0–1)
BILIRUB SERPL-MCNC: 0.2 MG/DL (ref 0.2–1)
BUN SERPL-MCNC: 4 MG/DL (ref 6–20)
BUN/CREAT SERPL: 6 (ref 12–20)
CA-I BLD-MCNC: 9 MG/DL (ref 8.5–10.1)
CHLORIDE SERPL-SCNC: 101 MMOL/L (ref 97–108)
CO2 SERPL-SCNC: 24 MMOL/L (ref 21–32)
CREAT SERPL-MCNC: 0.63 MG/DL (ref 0.7–1.3)
DIFFERENTIAL METHOD BLD: ABNORMAL
EOSINOPHIL # BLD: 0.3 K/UL (ref 0–0.4)
EOSINOPHIL NFR BLD: 4 % (ref 0–7)
ERYTHROCYTE [DISTWIDTH] IN BLOOD BY AUTOMATED COUNT: 13.4 % (ref 11.5–14.5)
GLOBULIN SER CALC-MCNC: 4.4 G/DL (ref 2–4)
GLUCOSE SERPL-MCNC: 119 MG/DL (ref 65–100)
HCT VFR BLD AUTO: 35.3 % (ref 36.6–50.3)
HGB BLD-MCNC: 11.3 G/DL (ref 12.1–17)
IMM GRANULOCYTES # BLD AUTO: 0 K/UL (ref 0–0.04)
IMM GRANULOCYTES NFR BLD AUTO: 0 % (ref 0–0.5)
LYMPHOCYTES # BLD: 1.4 K/UL (ref 0.8–3.5)
LYMPHOCYTES NFR BLD: 16 % (ref 12–49)
MCH RBC QN AUTO: 30.1 PG (ref 26–34)
MCHC RBC AUTO-ENTMCNC: 32 G/DL (ref 30–36.5)
MCV RBC AUTO: 93.9 FL (ref 80–99)
MONOCYTES # BLD: 0.9 K/UL (ref 0–1)
MONOCYTES NFR BLD: 10 % (ref 5–13)
NEUTS SEG # BLD: 6.2 K/UL (ref 1.8–8)
NEUTS SEG NFR BLD: 70 % (ref 32–75)
NRBC # BLD: 0 K/UL (ref 0–0.01)
NRBC BLD-RTO: 0 PER 100 WBC
PHENYTOIN SERPL-MCNC: 4.5 UG/ML (ref 10–20)
PLATELET # BLD AUTO: 349 K/UL (ref 150–400)
PMV BLD AUTO: 9.6 FL (ref 8.9–12.9)
POTASSIUM SERPL-SCNC: 3.8 MMOL/L (ref 3.5–5.1)
PROT SERPL-MCNC: 7.3 G/DL (ref 6.4–8.2)
RBC # BLD AUTO: 3.76 M/UL (ref 4.1–5.7)
SODIUM SERPL-SCNC: 134 MMOL/L (ref 136–145)
WBC # BLD AUTO: 8.9 K/UL (ref 4.1–11.1)

## 2022-01-01 PROCEDURE — 80185 ASSAY OF PHENYTOIN TOTAL: CPT

## 2022-01-01 PROCEDURE — 74011250637 HC RX REV CODE- 250/637: Performed by: PHYSICIAN ASSISTANT

## 2022-01-01 PROCEDURE — 74011250637 HC RX REV CODE- 250/637: Performed by: HOSPITALIST

## 2022-01-01 PROCEDURE — 85025 COMPLETE CBC W/AUTO DIFF WBC: CPT

## 2022-01-01 PROCEDURE — 80053 COMPREHEN METABOLIC PANEL: CPT

## 2022-01-01 PROCEDURE — 65270000029 HC RM PRIVATE

## 2022-01-01 PROCEDURE — 74011250636 HC RX REV CODE- 250/636: Performed by: HOSPITALIST

## 2022-01-01 PROCEDURE — 74011250637 HC RX REV CODE- 250/637: Performed by: NURSE PRACTITIONER

## 2022-01-01 PROCEDURE — 74011250637 HC RX REV CODE- 250/637: Performed by: INTERNAL MEDICINE

## 2022-01-01 PROCEDURE — 99232 SBSQ HOSP IP/OBS MODERATE 35: CPT | Performed by: INTERNAL MEDICINE

## 2022-01-01 PROCEDURE — 36415 COLL VENOUS BLD VENIPUNCTURE: CPT

## 2022-01-01 RX ADMIN — LEVETIRACETAM 1000 MG: 10 INJECTION, SOLUTION INTRAVENOUS at 10:33

## 2022-01-01 RX ADMIN — ACETAMINOPHEN 650 MG: 325 TABLET ORAL at 16:44

## 2022-01-01 RX ADMIN — APIXABAN 5 MG: 5 TABLET, FILM COATED ORAL at 21:10

## 2022-01-01 RX ADMIN — LEVETIRACETAM 1000 MG: 10 INJECTION, SOLUTION INTRAVENOUS at 21:10

## 2022-01-01 RX ADMIN — PREDNISOLONE ACETATE 1 DROP: 10 SUSPENSION/ DROPS OPHTHALMIC at 21:12

## 2022-01-01 RX ADMIN — ERYTHROMYCIN: 5 OINTMENT OPHTHALMIC at 13:19

## 2022-01-01 RX ADMIN — MIDODRINE HYDROCHLORIDE 5 MG: 5 TABLET ORAL at 10:33

## 2022-01-01 RX ADMIN — APIXABAN 5 MG: 5 TABLET, FILM COATED ORAL at 01:03

## 2022-01-01 RX ADMIN — LAMOTRIGINE 300 MG: 100 TABLET ORAL at 01:03

## 2022-01-01 RX ADMIN — PANTOPRAZOLE SODIUM 40 MG: 40 TABLET, DELAYED RELEASE ORAL at 01:05

## 2022-01-01 RX ADMIN — PREDNISOLONE ACETATE 1 DROP: 10 SUSPENSION/ DROPS OPHTHALMIC at 10:34

## 2022-01-01 RX ADMIN — PSYLLIUM HUSK 1 PACKET: 3.4 POWDER ORAL at 10:34

## 2022-01-01 RX ADMIN — LEVETIRACETAM 1000 MG: 10 INJECTION, SOLUTION INTRAVENOUS at 01:03

## 2022-01-01 RX ADMIN — PANTOPRAZOLE SODIUM 40 MG: 40 TABLET, DELAYED RELEASE ORAL at 21:10

## 2022-01-01 RX ADMIN — PANTOPRAZOLE SODIUM 40 MG: 40 TABLET, DELAYED RELEASE ORAL at 10:32

## 2022-01-01 RX ADMIN — PHENYTOIN 130 MG: 125 SUSPENSION ORAL at 21:10

## 2022-01-01 RX ADMIN — FOLIC ACID 1 MG: 1 TABLET ORAL at 10:32

## 2022-01-01 RX ADMIN — LAMOTRIGINE 300 MG: 100 TABLET ORAL at 21:10

## 2022-01-01 RX ADMIN — PHENYTOIN 100 MG: 125 SUSPENSION ORAL at 10:34

## 2022-01-01 RX ADMIN — LAMOTRIGINE 300 MG: 100 TABLET ORAL at 10:32

## 2022-01-01 RX ADMIN — AMOXICILLIN AND CLAVULANATE POTASSIUM 1 TABLET: 875; 125 TABLET, FILM COATED ORAL at 21:00

## 2022-01-01 RX ADMIN — AMOXICILLIN AND CLAVULANATE POTASSIUM 1 TABLET: 875; 125 TABLET, FILM COATED ORAL at 12:00

## 2022-01-01 RX ADMIN — ERYTHROMYCIN: 5 OINTMENT OPHTHALMIC at 06:00

## 2022-01-01 RX ADMIN — MIDODRINE HYDROCHLORIDE 5 MG: 5 TABLET ORAL at 13:18

## 2022-01-01 RX ADMIN — PHENYTOIN 130 MG: 125 SUSPENSION ORAL at 01:02

## 2022-01-01 RX ADMIN — APIXABAN 5 MG: 5 TABLET, FILM COATED ORAL at 10:33

## 2022-01-01 RX ADMIN — CETIRIZINE HYDROCHLORIDE 10 MG: 10 TABLET, FILM COATED ORAL at 10:33

## 2022-01-01 RX ADMIN — KETOTIFEN FUMARATE 1 DROP: 0.35 SOLUTION/ DROPS OPHTHALMIC at 21:12

## 2022-01-01 RX ADMIN — KETOTIFEN FUMARATE 1 DROP: 0.35 SOLUTION/ DROPS OPHTHALMIC at 10:34

## 2022-01-01 RX ADMIN — MIDODRINE HYDROCHLORIDE 5 MG: 5 TABLET ORAL at 16:44

## 2022-01-01 NOTE — PROGRESS NOTES
Hospitalist Progress Note    Subjective:   Daily Progress Note: 1/1/2022 10:20 AM    Patient resting comfortably in bed. Patient nonverbal at baseline. He notes reviewed no acute events overnight. Vital signs reviewed temperature at 99.6 and slightly tachycardic at 109. Patient's mother at bedside had long discussion about Dilantin and different doses effects on patient. Patient sees neurologist for the past 25 years and Dr. Marina Pagan in [place]. Patient's mother believes that Dilantin fluctuation can cause drowsiness. Patient normally is interactive with staff is nonverbal at baseline but normally is sitting in wheelchair. Per mom there is a agency that accepted him in having a group meeting on Monday around 1 PM.  Last documentation was from 12/29 saying that it could take up to 3 weeks to have acceptance process for new group home.       Current Facility-Administered Medications   Medication Dose Route Frequency    amoxicillin-clavulanate (AUGMENTIN) 875-125 mg per tablet 1 Tablet  1 Tablet Oral Q12H    erythromycin (ILOTYCIN) 5 mg/gram (0.5 %) ophthalmic ointment   Left Eye Q8H    pantoprazole (PROTONIX) tablet 40 mg  40 mg Oral BID    midodrine (PROAMATINE) tablet 5 mg  5 mg Oral TID WITH MEALS    prednisoLONE acetate (PRED FORTE) 1 % ophthalmic suspension 1 Drop  1 Drop Left Eye BID    apixaban (ELIQUIS) tablet 5 mg  5 mg Oral BID    0.9% sodium chloride infusion  75 mL/hr IntraVENous CONTINUOUS    levETIRAcetam in saline (iso-os) (KEPPRA) infusion 1,000 mg  1,000 mg IntraVENous BID    acetaminophen (TYLENOL) suppository 650 mg  650 mg Rectal Q4H PRN    ondansetron (ZOFRAN) injection 4 mg  4 mg IntraVENous Q6H PRN    phenytoin (DILANTIN) 100 mg/4 mL oral suspension 100 mg  100 mg Oral DAILY    phenytoin (DILANTIN) 100 mg/4 mL oral suspension 130 mg  130 mg Oral QHS    cetirizine (ZYRTEC) tablet 10 mg  10 mg Oral DAILY    folic acid (FOLVITE) tablet 1 mg  1 mg Oral DAILY    ketotifen (ZADITOR) 0.025 % (0.035 %) ophthalmic solution 1 Drop  1 Drop Both Eyes BID    lactulose (CHRONULAC) 10 gram/15 mL solution 15 mL  10 g Oral DAILY    lamoTRIgine (LaMICtal) tablet 300 mg  300 mg Oral BID    psyllium husk-aspartame (METAMUCIL FIBER) packet 1 Packet  1 Packet Oral DAILY    acetaminophen (TYLENOL) tablet 650 mg  650 mg Oral Q4H PRN    albuterol (PROVENTIL HFA, VENTOLIN HFA, PROAIR HFA) inhaler 2 Puff  2 Puff Inhalation Q4H PRN        Review of Systems  Review of Systems   Unable to perform ROS: Acuity of condition            Objective:     Visit Vitals  /69   Pulse (!) 109   Temp 99.6 °F (37.6 °C)   Resp 21   Ht 5' (1.524 m)   Wt 60 kg (132 lb 4.8 oz)   SpO2 96%   BMI 25.84 kg/m²    O2 Flow Rate (L/min): 2 l/min O2 Device: None (Room air)    Temp (24hrs), Av.8 °F (37.1 °C), Min:98.1 °F (36.7 °C), Max:99.6 °F (37.6 °C)      No intake/output data recorded.  1901 -  0700  In: 7995.8 [I.V.:6895.8]  Out: 3000 [Urine:3000]    Recent Results (from the past 24 hour(s))   VANCOMYCIN, RANDOM    Collection Time: 21 11:08 AM   Result Value Ref Range    Vancomycin, random 83.0 ug/mL   METABOLIC PANEL, COMPREHENSIVE    Collection Time: 22  7:28 AM   Result Value Ref Range    Sodium 134 (L) 136 - 145 mmol/L    Potassium 3.8 3.5 - 5.1 mmol/L    Chloride 101 97 - 108 mmol/L    CO2 24 21 - 32 mmol/L    Anion gap 9 5 - 15 mmol/L    Glucose 119 (H) 65 - 100 mg/dL    BUN 4 (L) 6 - 20 mg/dL    Creatinine 0.63 (L) 0.70 - 1.30 mg/dL    BUN/Creatinine ratio 6 (L) 12 - 20      GFR est AA >60 >60 ml/min/1.73m2    GFR est non-AA >60 >60 ml/min/1.73m2    Calcium 9.0 8.5 - 10.1 mg/dL    Bilirubin, total 0.2 0.2 - 1.0 mg/dL    AST (SGOT) 24 15 - 37 U/L    ALT (SGPT) 27 12 - 78 U/L    Alk.  phosphatase 177 (H) 45 - 117 U/L    Protein, total 7.3 6.4 - 8.2 g/dL    Albumin 2.9 (L) 3.5 - 5.0 g/dL    Globulin 4.4 (H) 2.0 - 4.0 g/dL    A-G Ratio 0.7 (L) 1.1 - 2.2     CBC WITH AUTOMATED DIFF Collection Time: 01/01/22  7:28 AM   Result Value Ref Range    WBC 8.9 4.1 - 11.1 K/uL    RBC 3.76 (L) 4.10 - 5.70 M/uL    HGB 11.3 (L) 12.1 - 17.0 g/dL    HCT 35.3 (L) 36.6 - 50.3 %    MCV 93.9 80.0 - 99.0 FL    MCH 30.1 26.0 - 34.0 PG    MCHC 32.0 30.0 - 36.5 g/dL    RDW 13.4 11.5 - 14.5 %    PLATELET 297 361 - 181 K/uL    MPV 9.6 8.9 - 12.9 FL    NRBC 0.0 0.0  WBC    ABSOLUTE NRBC 0.00 0.00 - 0.01 K/uL    NEUTROPHILS 70 32 - 75 %    LYMPHOCYTES 16 12 - 49 %    MONOCYTES 10 5 - 13 %    EOSINOPHILS 4 0 - 7 %    BASOPHILS 0 0 - 1 %    IMMATURE GRANULOCYTES 0 0 - 0.5 %    ABS. NEUTROPHILS 6.2 1.8 - 8.0 K/UL    ABS. LYMPHOCYTES 1.4 0.8 - 3.5 K/UL    ABS. MONOCYTES 0.9 0.0 - 1.0 K/UL    ABS. EOSINOPHILS 0.3 0.0 - 0.4 K/UL    ABS. BASOPHILS 0.0 0.0 - 0.1 K/UL    ABS. IMM. GRANS. 0.0 0.00 - 0.04 K/UL    DF AUTOMATED          XR CHEST PORT   Final Result      Single portable AP view compared to December 23, 2021. Generator pack on the   left with electrode extending to the left side of the neck. Suboptimal   inspiratory film compromises visualization of the lower lung fields. Heart size normal. No mediastinal widening or shift. Reticular nodular pattern in the lungs persists and may be postinflammatory or   related to prior aspiration. No developing consolidation. Negative for pulmonary   edema, pleural effusion or pneumothorax. No acute fractures. Negative for subdiaphragmatic free air. XR CHEST PORT   Final Result   1. Nasogastric tube tip overlies the right upper quadrant, likely the gastric   antrum or duodenal bulb. 2. Persistent reticulonodular opacities in both lungs. CTA CHEST W OR W WO CONT   Final Result   1. Extensive bilateral reticulonodular infiltrates throughout both lungs similar   to the previous study although both lungs were not included in their entirety on   the previous study. Findings may represent infectious, inflammatory or   neoplastic process. Recommend follow-up to. No evidence of pulmonary embolus,   aortic aneurysm or aortic dissection. 3. Borderline enlarged gastrohepatic lymph node. 4. Thickened esophagus, recommend clinical evaluation. DUPLEX LOWER EXT VENOUS BILAT   Final Result      XR CHEST PORT   Final Result   No significant interval change. CT ABD PELV W CONT   Final Result      1. Small focus of intraluminal gas within the urinary bladder. Please correlate   for recent instrumentation or other causes of intraluminal gas, including an   infectious process. 2. Somewhat reticular nodular opacities in the lung bases, suboptimally   evaluated due to respiratory motion. Some of these have what appears to be a   tree and bud morphology, suggesting either chronic aspiration or an infectious   or inflammatory small airways disease. Otherwise a fibrotic process should be   considered. This would be better characterized with dedicated chest CT as   clinically warranted. 3. Underdistention versus circumferential wall thickening of the distal   esophagus. 4. Probable left trochanteric bursitis with sterility of the fluid   indeterminate. 5. Probable myositis ossificans surrounding the left hip. Please correlate for   history of prior trauma. 6. Enlarged lymph nodes in the upper abdomen, nonspecific. Comparison with   outside imaging would be helpful, if available, to determine longer term   stability. Otherwise short interval follow-up in 3 months is recommended. XR CHEST SNGL V   Final Result   Patchy opacities within the lungs, nonspecific, but leading differential   considerations include infection or pulmonary edema. Superimposed acute disease   on an underlying chronic interstitial process is also possible. Radiographic   follow-up is recommended to exclude other causes of opacification. XR ABD (KUB)   Final Result   Mildly prominent gas-filled loops of bowel within the central abdomen with   overall nonobstructive bowel gas pattern. PHYSICAL EXAM:    Physical Exam  Vitals and nursing note reviewed. Constitutional:       Appearance: He is ill-appearing. HENT:      Head: Normocephalic and atraumatic. Cardiovascular:      Rate and Rhythm: Normal rate and regular rhythm. Pulmonary:      Effort: No respiratory distress. Breath sounds: No wheezing. Abdominal:      General: Bowel sounds are normal. There is no distension. Tenderness: There is no abdominal tenderness. Comments: PEG tube in place   Genitourinary:     Comments: Weinberg present  Musculoskeletal:         General: No tenderness. Right lower leg: No edema. Left lower leg: No edema. Comments: Upper extremities:  Left: adducted and flexed against chest   Right: abducted and flexed against chest    Lower extremities: bilaterally internally rotated and flexed   Skin:     General: Skin is warm. Capillary Refill: Capillary refill takes less than 2 seconds. Neurological:      Mental Status: He is alert. He is disoriented. Comments: Nonverbal at baseline   Psychiatric:      Comments: Unable to assess due to mental acuity        Data Review    Recent Results (from the past 24 hour(s))   VANCOMYCIN, RANDOM    Collection Time: 12/31/21 11:08 AM   Result Value Ref Range    Vancomycin, random 22.8 ug/mL   METABOLIC PANEL, COMPREHENSIVE    Collection Time: 01/01/22  7:28 AM   Result Value Ref Range    Sodium 134 (L) 136 - 145 mmol/L    Potassium 3.8 3.5 - 5.1 mmol/L    Chloride 101 97 - 108 mmol/L    CO2 24 21 - 32 mmol/L    Anion gap 9 5 - 15 mmol/L    Glucose 119 (H) 65 - 100 mg/dL    BUN 4 (L) 6 - 20 mg/dL    Creatinine 0.63 (L) 0.70 - 1.30 mg/dL    BUN/Creatinine ratio 6 (L) 12 - 20      GFR est AA >60 >60 ml/min/1.73m2    GFR est non-AA >60 >60 ml/min/1.73m2    Calcium 9.0 8.5 - 10.1 mg/dL    Bilirubin, total 0.2 0.2 - 1.0 mg/dL    AST (SGOT) 24 15 - 37 U/L    ALT (SGPT) 27 12 - 78 U/L    Alk.  phosphatase 177 (H) 45 - 117 U/L    Protein, total 7.3 6.4 - 8.2 g/dL    Albumin 2.9 (L) 3.5 - 5.0 g/dL    Globulin 4.4 (H) 2.0 - 4.0 g/dL    A-G Ratio 0.7 (L) 1.1 - 2.2     CBC WITH AUTOMATED DIFF    Collection Time: 01/01/22  7:28 AM   Result Value Ref Range    WBC 8.9 4.1 - 11.1 K/uL    RBC 3.76 (L) 4.10 - 5.70 M/uL    HGB 11.3 (L) 12.1 - 17.0 g/dL    HCT 35.3 (L) 36.6 - 50.3 %    MCV 93.9 80.0 - 99.0 FL    MCH 30.1 26.0 - 34.0 PG    MCHC 32.0 30.0 - 36.5 g/dL    RDW 13.4 11.5 - 14.5 %    PLATELET 555 161 - 381 K/uL    MPV 9.6 8.9 - 12.9 FL    NRBC 0.0 0.0  WBC    ABSOLUTE NRBC 0.00 0.00 - 0.01 K/uL    NEUTROPHILS 70 32 - 75 %    LYMPHOCYTES 16 12 - 49 %    MONOCYTES 10 5 - 13 %    EOSINOPHILS 4 0 - 7 %    BASOPHILS 0 0 - 1 %    IMMATURE GRANULOCYTES 0 0 - 0.5 %    ABS. NEUTROPHILS 6.2 1.8 - 8.0 K/UL    ABS. LYMPHOCYTES 1.4 0.8 - 3.5 K/UL    ABS. MONOCYTES 0.9 0.0 - 1.0 K/UL    ABS. EOSINOPHILS 0.3 0.0 - 0.4 K/UL    ABS. BASOPHILS 0.0 0.0 - 0.1 K/UL    ABS. IMM. GRANS. 0.0 0.00 - 0.04 K/UL    DF AUTOMATED          Assessment/Plan:     Active Problems:    Sepsis ([address] (12/17/2021)      Aspiration pneumonia ([address] (12/17/2021)      Respiratory failure with hypoxia ([facility] [address] (12/21/2021)      DVT (deep venous thrombosis) ([facility] [address] (12/23/2021)        Hospital Course:    Manolo Morrell is a 70-year-old male with a PMH significant for intellectual disabilities, seizure disorder, quadriplegia who lives in a group home who presented with a 4 day history of anorexia, constipation s/p enema, and vomiting. In emergency room he was hypertensive, febrile, tachycardic and altered from baseline. Chest x-ray consistent with aspiration pneumonia. CT abd/pelvis Admitted for further management of sepsis, aspiration pneumonia, respiratory failure with hypoxia, and breakthrough seizure activity. Blood pressure decreasing required ICU admission and septic shock and started on Levophed. Started on vancomycin and Zosyn for aspiration pneumonia.  Required nasal cannula oxygen for acute hypoxic respiratory failure. Weaned off pressor therapy and transferred to medical floor for further management. Doppler study positive for left external iliac vein thrombosis, left common femoral vein thrombosis and left proximal femoral vein thrombosis. He was started on Eliquis. Decreased antibiotic to monotherapy IV Zosyn. Started on Ketotifen and prednisone eyedrop for left sclera edema. Due to high risk of aspiration secondary to chronic disabilities and anatomical swallowing weakness, inability to maintain hydration and nutrition status he would benefit from feeding tube placement. GI has been consulted. PEG tube placed on 12/27/21. Heparin drip for bridge therapy while holding Eliquis. On the night of 12/28/2021 patient began vomiting tube feeds and was tachycardic/tachypneic. Concern for sepsis secondary aspiration pneumonia. Started back on IV vancomycin and Zosyn. Repeat chest x-ray showed no developing consolidation but does show persistent postinflammatory or related to prior aspiration reticular nodule pattern. Patient did not have a white count or elevated inflammatory markers. Patient was initially in the 80s saturating but is now normal. Vancomycin and Zosyn discontinued and started on Augmentin for 14 doses. Will check phenytoin level and reconsult neurology if abnormal.    Acute hypoxic respiratory failure secondary to aspiration pneumonia  Septic shock - resolved off pressors  S/p zosyn x10 days however on 12/28/2021 2/2 vomiting patient with suspected aspiration and restarted on IV Vanco and Zosyn.  This was discontinued and started on Augmentin for 14 doses  Strict aspiration precautions in place  12/18 blood: negative, final  12/29 blood: NGTD, prelim  ID following    Seizure disorder  Continue with keppra, lamictal, and dilantin 100mg daily and 130 mg at bedtime    Neurology consulted and has signed off    Intellectual disability/quadriplegia  History of dysphagia  PEG tube placed on 12/27  Per case management patient cannot return back to the group home  GI/nutrition/ST following   PT OT to eval    DVT  Venous doppler positive for DVT  Continue on Eliquis    Hypertension  Continue on midodrine 5 mg twice daily    Left sclera edema  Continue on ketotifen and prednisolone drops    DVT Prophylaxis: eliquis  GI Prophylaxis: protonix  Discharge and disposition barriers: >48hrs. Per mom there is a agency that accepted him in having a group meeting on Monday around 1 PM.  Last documentation was from 12/29 saying that it could take up to 3 weeks to have acceptance process for new group home. Care Plan discussed with: Patient/Family, Nurse and     Total time spent with patient: 35 minutes.

## 2022-01-01 NOTE — PROGRESS NOTES
Infectious Disease Progress Note           Subjective:   Pt seen and examined at bedside. Mother at bedside, concerned about increased lethargy and increased sleepiness, no acute events since last seen. He is afebrile w a normal WBC on routine labs. Tolerating tube feeds   Objective:   Physical Exam:     Visit Vitals  /69   Pulse (!) 109   Temp 99.6 °F (37.6 °C)   Resp 21   Ht 5' (1.524 m)   Wt 132 lb 4.8 oz (60 kg)   SpO2 96%   BMI 25.84 kg/m²    O2 Flow Rate (L/min): 2 l/min O2 Device: None (Room air)    Temp (24hrs), Av.8 °F (37.1 °C), Min:98.1 °F (36.7 °C), Max:99.6 °F (37.6 °C)    No intake/output data recorded.    1901 -  0700  In: 7995.8 [I.V.:6895.8]  Out: 3000 [Urine:3000]    General: NAD, alert, non-verbal   HEENT: ALEJO, Moist mucosa, decreased left sclera erythema  Lungs: CTA b/l, decreased at the bases, no wheeze/rhonchi   Heart: S1S2+, RRR, no murmur  Abdo: Soft, NT, ND, +BS, +Peg tube   Exts: Contracted exts, some leg swelling   Skin: No wounds, No rashes or lesions    Data Review:       Recent Days:  Recent Labs     22  0802 21  0906   WBC 8.9 8.7 8.4   HGB 11.3* 11.0* 10.9*   HCT 35.3* 34.7* 34.6*    337 336     Recent Labs     22  0728 21  0802 21  0906   BUN 4* 4* 4*   CREA 0.63* 0.70 0.61*       Lab Results   Component Value Date/Time    C-Reactive protein 6.44 (H) 2021 05:00 AM        Microbiology     Results     Procedure Component Value Units Date/Time    CULTURE, BLOOD [[id number]] Collected: 21    Order Status: Completed Specimen: Blood Updated: 22 1102     Special Requests: --        No Special Requests  Left  Hand       Culture result: No growth 2 days       CULTURE, BLOOD [[id number]] Collected: 21 0720    Order Status: Completed Specimen: Blood Updated: 22     Special Requests: No Special Requests        Culture result: No growth 3 days           Diagnostics CXR Results  (Last 48 hours)    None             Assessment/Plan     1. Aspiration pneumonitis, H/o dysphagia w chronic aspiration      Clear lungs on exam, maintaining O2 sats on RA, no cough       S/p Vanc and Zosyn, currently on Augmentin       Ongoing risk factors for aspiration PNA, continue to keep HOB > 30 degrees      Routine labs in the morning       2. Dysphagia/poor oral intake: S/p peg tube placement on 12/27      On continuous feeds, tolerating       3. H/o seizure d/o, Controlled on anti-epileptics      Mother concerned about increased lethargy, ? From anti-epileptics     4. Acute left leg DVT, anticoagulated on Eliquis      5.  Left sclera erythema: Improved on steroid drops and Erythromycin oint     Cleared for d/c from ID stand point     Twanda Castleman, MD    1/1/2022

## 2022-01-02 LAB
ALBUMIN SERPL-MCNC: 2.7 G/DL (ref 3.5–5)
ALBUMIN/GLOB SERPL: 0.6 {RATIO} (ref 1.1–2.2)
ALP SERPL-CCNC: 172 U/L (ref 45–117)
ALT SERPL-CCNC: 25 U/L (ref 12–78)
ANION GAP SERPL CALC-SCNC: 7 MMOL/L (ref 5–15)
AST SERPL W P-5'-P-CCNC: 22 U/L (ref 15–37)
BASOPHILS # BLD: 0 K/UL (ref 0–0.1)
BASOPHILS NFR BLD: 1 % (ref 0–1)
BILIRUB SERPL-MCNC: 0.2 MG/DL (ref 0.2–1)
BUN SERPL-MCNC: 6 MG/DL (ref 6–20)
BUN/CREAT SERPL: 9 (ref 12–20)
CA-I BLD-MCNC: 9 MG/DL (ref 8.5–10.1)
CHLORIDE SERPL-SCNC: 104 MMOL/L (ref 97–108)
CO2 SERPL-SCNC: 25 MMOL/L (ref 21–32)
CREAT SERPL-MCNC: 0.64 MG/DL (ref 0.7–1.3)
DIFFERENTIAL METHOD BLD: ABNORMAL
EOSINOPHIL # BLD: 0.4 K/UL (ref 0–0.4)
EOSINOPHIL NFR BLD: 5 % (ref 0–7)
ERYTHROCYTE [DISTWIDTH] IN BLOOD BY AUTOMATED COUNT: 13.4 % (ref 11.5–14.5)
GLOBULIN SER CALC-MCNC: 4.3 G/DL (ref 2–4)
GLUCOSE SERPL-MCNC: 134 MG/DL (ref 65–100)
HCT VFR BLD AUTO: 35.1 % (ref 36.6–50.3)
HGB BLD-MCNC: 11.1 G/DL (ref 12.1–17)
IMM GRANULOCYTES # BLD AUTO: 0 K/UL (ref 0–0.04)
IMM GRANULOCYTES NFR BLD AUTO: 0 % (ref 0–0.5)
LYMPHOCYTES # BLD: 1.6 K/UL (ref 0.8–3.5)
LYMPHOCYTES NFR BLD: 18 % (ref 12–49)
MCH RBC QN AUTO: 30.2 PG (ref 26–34)
MCHC RBC AUTO-ENTMCNC: 31.6 G/DL (ref 30–36.5)
MCV RBC AUTO: 95.4 FL (ref 80–99)
MONOCYTES # BLD: 0.8 K/UL (ref 0–1)
MONOCYTES NFR BLD: 9 % (ref 5–13)
NEUTS SEG # BLD: 5.8 K/UL (ref 1.8–8)
NEUTS SEG NFR BLD: 67 % (ref 32–75)
NRBC # BLD: 0 K/UL (ref 0–0.01)
NRBC BLD-RTO: 0 PER 100 WBC
PLATELET # BLD AUTO: 329 K/UL (ref 150–400)
PMV BLD AUTO: 9.1 FL (ref 8.9–12.9)
POTASSIUM SERPL-SCNC: 3.8 MMOL/L (ref 3.5–5.1)
PROT SERPL-MCNC: 7 G/DL (ref 6.4–8.2)
RBC # BLD AUTO: 3.68 M/UL (ref 4.1–5.7)
SODIUM SERPL-SCNC: 136 MMOL/L (ref 136–145)
WBC # BLD AUTO: 8.7 K/UL (ref 4.1–11.1)

## 2022-01-02 PROCEDURE — 99232 SBSQ HOSP IP/OBS MODERATE 35: CPT | Performed by: INTERNAL MEDICINE

## 2022-01-02 PROCEDURE — 36415 COLL VENOUS BLD VENIPUNCTURE: CPT

## 2022-01-02 PROCEDURE — 97161 PT EVAL LOW COMPLEX 20 MIN: CPT

## 2022-01-02 PROCEDURE — 74011250637 HC RX REV CODE- 250/637: Performed by: HOSPITALIST

## 2022-01-02 PROCEDURE — 74011250636 HC RX REV CODE- 250/636: Performed by: HOSPITALIST

## 2022-01-02 PROCEDURE — 97530 THERAPEUTIC ACTIVITIES: CPT

## 2022-01-02 PROCEDURE — 74011250637 HC RX REV CODE- 250/637: Performed by: PHYSICIAN ASSISTANT

## 2022-01-02 PROCEDURE — 85025 COMPLETE CBC W/AUTO DIFF WBC: CPT

## 2022-01-02 PROCEDURE — 65270000029 HC RM PRIVATE

## 2022-01-02 PROCEDURE — 74011250637 HC RX REV CODE- 250/637: Performed by: INTERNAL MEDICINE

## 2022-01-02 PROCEDURE — 80053 COMPREHEN METABOLIC PANEL: CPT

## 2022-01-02 PROCEDURE — 74011250637 HC RX REV CODE- 250/637: Performed by: NURSE PRACTITIONER

## 2022-01-02 RX ORDER — LORAZEPAM 2 MG/ML
2 INJECTION INTRAMUSCULAR
Status: DISCONTINUED | OUTPATIENT
Start: 2022-01-02 | End: 2022-01-10 | Stop reason: HOSPADM

## 2022-01-02 RX ORDER — PHENYTOIN 125 MG/5ML
130 SUSPENSION ORAL DAILY
Status: DISCONTINUED | OUTPATIENT
Start: 2022-01-03 | End: 2022-01-10 | Stop reason: HOSPADM

## 2022-01-02 RX ADMIN — ERYTHROMYCIN: 5 OINTMENT OPHTHALMIC at 00:22

## 2022-01-02 RX ADMIN — ERYTHROMYCIN: 5 OINTMENT OPHTHALMIC at 18:27

## 2022-01-02 RX ADMIN — MIDODRINE HYDROCHLORIDE 5 MG: 5 TABLET ORAL at 11:34

## 2022-01-02 RX ADMIN — AMOXICILLIN AND CLAVULANATE POTASSIUM 1 TABLET: 875; 125 TABLET, FILM COATED ORAL at 09:00

## 2022-01-02 RX ADMIN — ERYTHROMYCIN: 5 OINTMENT OPHTHALMIC at 05:26

## 2022-01-02 RX ADMIN — MIDODRINE HYDROCHLORIDE 5 MG: 5 TABLET ORAL at 11:13

## 2022-01-02 RX ADMIN — AMOXICILLIN AND CLAVULANATE POTASSIUM 1 TABLET: 875; 125 TABLET, FILM COATED ORAL at 21:00

## 2022-01-02 RX ADMIN — LEVETIRACETAM 1000 MG: 10 INJECTION, SOLUTION INTRAVENOUS at 11:12

## 2022-01-02 RX ADMIN — LACTULOSE 15 ML: 10 SOLUTION ORAL at 11:13

## 2022-01-02 RX ADMIN — LAMOTRIGINE 300 MG: 100 TABLET ORAL at 11:12

## 2022-01-02 RX ADMIN — APIXABAN 5 MG: 5 TABLET, FILM COATED ORAL at 11:12

## 2022-01-02 RX ADMIN — FOLIC ACID 1 MG: 1 TABLET ORAL at 11:12

## 2022-01-02 RX ADMIN — PANTOPRAZOLE SODIUM 40 MG: 40 TABLET, DELAYED RELEASE ORAL at 11:13

## 2022-01-02 RX ADMIN — KETOTIFEN FUMARATE 1 DROP: 0.35 SOLUTION/ DROPS OPHTHALMIC at 22:12

## 2022-01-02 RX ADMIN — PREDNISOLONE ACETATE 1 DROP: 10 SUSPENSION/ DROPS OPHTHALMIC at 22:12

## 2022-01-02 RX ADMIN — PSYLLIUM HUSK 1 PACKET: 3.4 POWDER ORAL at 11:12

## 2022-01-02 RX ADMIN — LEVETIRACETAM 1000 MG: 10 INJECTION, SOLUTION INTRAVENOUS at 22:08

## 2022-01-02 RX ADMIN — MIDODRINE HYDROCHLORIDE 5 MG: 5 TABLET ORAL at 18:26

## 2022-01-02 RX ADMIN — ERYTHROMYCIN: 5 OINTMENT OPHTHALMIC at 22:12

## 2022-01-02 RX ADMIN — PHENYTOIN 130 MG: 125 SUSPENSION ORAL at 22:08

## 2022-01-02 RX ADMIN — PREDNISOLONE ACETATE 1 DROP: 10 SUSPENSION/ DROPS OPHTHALMIC at 11:35

## 2022-01-02 RX ADMIN — LAMOTRIGINE 300 MG: 100 TABLET ORAL at 22:08

## 2022-01-02 RX ADMIN — PANTOPRAZOLE SODIUM 40 MG: 40 TABLET, DELAYED RELEASE ORAL at 22:12

## 2022-01-02 RX ADMIN — APIXABAN 5 MG: 5 TABLET, FILM COATED ORAL at 22:08

## 2022-01-02 RX ADMIN — CETIRIZINE HYDROCHLORIDE 10 MG: 10 TABLET, FILM COATED ORAL at 11:12

## 2022-01-02 RX ADMIN — PHENYTOIN 100 MG: 125 SUSPENSION ORAL at 11:12

## 2022-01-02 RX ADMIN — KETOTIFEN FUMARATE 1 DROP: 0.35 SOLUTION/ DROPS OPHTHALMIC at 11:35

## 2022-01-02 NOTE — PROGRESS NOTES
Time: 7231-6263  Mother present. Clinician and mother attempt to administer po trials with patient, though patient pursing lips and turning head. Per chart review and mother, patient tolerating PEG-tube feedings. Education provided that max rehab potential achieved in acute setting s/t patient declining to participate. D/c from ST intervention at this time. Please re-consult if medically appropriate.

## 2022-01-02 NOTE — PROGRESS NOTES
Hospitalist Progress Note    Subjective:   Daily Progress Note: 1/2/2022 10:20 AM    Patient resting comfortably in bed. Patient nonverbal at baseline. No acute events overnight. Patient's mother at bedside answer all questions at time of examination. Patient was most active today. And was interacting with this provider still nonverbal    Patient experienced 1/32 seizure like activity. Patient's mother was at bedside and refused to get Ativan. Patient's mother wants neurologist at [facility] to communicate with neurologist in [place] Dr. Sacha Mc [phone]. This provider attempted to contact Dr. Wylie Councilman but was out of office and was unable to. Dr. Fatuma Joaquin was paged and advised to increase daily dilantin to 130 so it would be 130 mg to twice daily.      Current Facility-Administered Medications   Medication Dose Route Frequency    LORazepam (ATIVAN) injection 2 mg  2 mg IntraVENous Q4H PRN    LORazepam (ATIVAN) injection 2 mg  2 mg IntraVENous Q5MIN PRN    amoxicillin-clavulanate (AUGMENTIN) 875-125 mg per tablet 1 Tablet  1 Tablet Oral Q12H    erythromycin (ILOTYCIN) 5 mg/gram (0.5 %) ophthalmic ointment   Left Eye Q8H    pantoprazole (PROTONIX) tablet 40 mg  40 mg Oral BID    midodrine (PROAMATINE) tablet 5 mg  5 mg Oral TID WITH MEALS    prednisoLONE acetate (PRED FORTE) 1 % ophthalmic suspension 1 Drop  1 Drop Left Eye BID    apixaban (ELIQUIS) tablet 5 mg  5 mg Oral BID    0.9% sodium chloride infusion  75 mL/hr IntraVENous CONTINUOUS    levETIRAcetam in saline (iso-os) (KEPPRA) infusion 1,000 mg  1,000 mg IntraVENous BID    acetaminophen (TYLENOL) suppository 650 mg  650 mg Rectal Q4H PRN    ondansetron (ZOFRAN) injection 4 mg  4 mg IntraVENous Q6H PRN    phenytoin (DILANTIN) 100 mg/4 mL oral suspension 100 mg  100 mg Oral DAILY    phenytoin (DILANTIN) 100 mg/4 mL oral suspension 130 mg  130 mg Oral QHS    cetirizine (ZYRTEC) tablet 10 mg  10 mg Oral DAILY    folic acid (FOLVITE) tablet 1 mg  1 mg Oral DAILY    ketotifen (ZADITOR) 0.025 % (0.035 %) ophthalmic solution 1 Drop  1 Drop Both Eyes BID    lactulose (CHRONULAC) 10 gram/15 mL solution 15 mL  10 g Oral DAILY    lamoTRIgine (LaMICtal) tablet 300 mg  300 mg Oral BID    psyllium husk-aspartame (METAMUCIL FIBER) packet 1 Packet  1 Packet Oral DAILY    acetaminophen (TYLENOL) tablet 650 mg  650 mg Oral Q4H PRN    albuterol (PROVENTIL HFA, VENTOLIN HFA, PROAIR HFA) inhaler 2 Puff  2 Puff Inhalation Q4H PRN        Review of Systems  Review of Systems   Unable to perform ROS: Acuity of condition            Objective:     Visit Vitals  /86 (BP 1 Location: Right upper arm, BP Patient Position: At rest)   Pulse (!) 102   Temp 98.9 °F (37.2 °C)   Resp 22   Ht 5' (1.524 m)   Wt 60 kg (132 lb 4.8 oz)   SpO2 96%   BMI 25.84 kg/m²    O2 Flow Rate (L/min): 2 l/min O2 Device: None (Room air)    Temp (24hrs), Av.4 °F (36.9 °C), Min:97.3 °F (36.3 °C), Max:99.7 °F (37.6 °C)      No intake/output data recorded. 1 -  0700  In: 41183.8 [I.V.:7720.8]  Out: 3000 [Urine:3000]    Recent Results (from the past 24 hour(s))   PHENYTOIN    Collection Time: 22  5:30 PM   Result Value Ref Range    Phenytoin 4.5 (L) 10 - 20 ug/mL   METABOLIC PANEL, COMPREHENSIVE    Collection Time: 22  8:16 AM   Result Value Ref Range    Sodium 136 136 - 145 mmol/L    Potassium 3.8 3.5 - 5.1 mmol/L    Chloride 104 97 - 108 mmol/L    CO2 25 21 - 32 mmol/L    Anion gap 7 5 - 15 mmol/L    Glucose 134 (H) 65 - 100 mg/dL    BUN 6 6 - 20 mg/dL    Creatinine 0.64 (L) 0.70 - 1.30 mg/dL    BUN/Creatinine ratio 9 (L) 12 - 20      GFR est AA >60 >60 ml/min/1.73m2    GFR est non-AA >60 >60 ml/min/1.73m2    Calcium 9.0 8.5 - 10.1 mg/dL    Bilirubin, total 0.2 0.2 - 1.0 mg/dL    AST (SGOT) 22 15 - 37 U/L    ALT (SGPT) 25 12 - 78 U/L    Alk.  phosphatase 172 (H) 45 - 117 U/L    Protein, total 7.0 6.4 - 8.2 g/dL    Albumin 2.7 (L) 3.5 - 5.0 g/dL    Globulin 4.3 (H) 2.0 - 4.0 g/dL    A-G Ratio 0.6 (L) 1.1 - 2.2     CBC WITH AUTOMATED DIFF    Collection Time: 01/02/22  8:16 AM   Result Value Ref Range    WBC 8.7 4.1 - 11.1 K/uL    RBC 3.68 (L) 4.10 - 5.70 M/uL    HGB 11.1 (L) 12.1 - 17.0 g/dL    HCT 35.1 (L) 36.6 - 50.3 %    MCV 95.4 80.0 - 99.0 FL    MCH 30.2 26.0 - 34.0 PG    MCHC 31.6 30.0 - 36.5 g/dL    RDW 13.4 11.5 - 14.5 %    PLATELET 781 892 - 594 K/uL    MPV 9.1 8.9 - 12.9 FL    NRBC 0.0 0.0  WBC    ABSOLUTE NRBC 0.00 0.00 - 0.01 K/uL    NEUTROPHILS 67 32 - 75 %    LYMPHOCYTES 18 12 - 49 %    MONOCYTES 9 5 - 13 %    EOSINOPHILS 5 0 - 7 %    BASOPHILS 1 0 - 1 %    IMMATURE GRANULOCYTES 0 0 - 0.5 %    ABS. NEUTROPHILS 5.8 1.8 - 8.0 K/UL    ABS. LYMPHOCYTES 1.6 0.8 - 3.5 K/UL    ABS. MONOCYTES 0.8 0.0 - 1.0 K/UL    ABS. EOSINOPHILS 0.4 0.0 - 0.4 K/UL    ABS. BASOPHILS 0.0 0.0 - 0.1 K/UL    ABS. IMM. GRANS. 0.0 0.00 - 0.04 K/UL    DF AUTOMATED          XR CHEST PORT   Final Result      Single portable AP view compared to December 23, 2021. Generator pack on the   left with electrode extending to the left side of the neck. Suboptimal   inspiratory film compromises visualization of the lower lung fields. Heart size normal. No mediastinal widening or shift. Reticular nodular pattern in the lungs persists and may be postinflammatory or   related to prior aspiration. No developing consolidation. Negative for pulmonary   edema, pleural effusion or pneumothorax. No acute fractures. Negative for subdiaphragmatic free air. XR CHEST PORT   Final Result   1. Nasogastric tube tip overlies the right upper quadrant, likely the gastric   antrum or duodenal bulb. 2. Persistent reticulonodular opacities in both lungs. CTA CHEST W OR W WO CONT   Final Result   1. Extensive bilateral reticulonodular infiltrates throughout both lungs similar   to the previous study although both lungs were not included in their entirety on   the previous study.  Findings may represent infectious, inflammatory or   neoplastic process. Recommend follow-up to. No evidence of pulmonary embolus,   aortic aneurysm or aortic dissection. 3. Borderline enlarged gastrohepatic lymph node. 4. Thickened esophagus, recommend clinical evaluation. DUPLEX LOWER EXT VENOUS BILAT   Final Result      XR CHEST PORT   Final Result   No significant interval change. CT ABD PELV W CONT   Final Result      1. Small focus of intraluminal gas within the urinary bladder. Please correlate   for recent instrumentation or other causes of intraluminal gas, including an   infectious process. 2. Somewhat reticular nodular opacities in the lung bases, suboptimally   evaluated due to respiratory motion. Some of these have what appears to be a   tree and bud morphology, suggesting either chronic aspiration or an infectious   or inflammatory small airways disease. Otherwise a fibrotic process should be   considered. This would be better characterized with dedicated chest CT as   clinically warranted. 3. Underdistention versus circumferential wall thickening of the distal   esophagus. 4. Probable left trochanteric bursitis with sterility of the fluid   indeterminate. 5. Probable myositis ossificans surrounding the left hip. Please correlate for   history of prior trauma. 6. Enlarged lymph nodes in the upper abdomen, nonspecific. Comparison with   outside imaging would be helpful, if available, to determine longer term   stability. Otherwise short interval follow-up in 3 months is recommended. XR CHEST SNGL V   Final Result   Patchy opacities within the lungs, nonspecific, but leading differential   considerations include infection or pulmonary edema. Superimposed acute disease   on an underlying chronic interstitial process is also possible. Radiographic   follow-up is recommended to exclude other causes of opacification.       XR ABD (KUB)   Final Result   Mildly prominent gas-filled loops of bowel within the central abdomen with   overall nonobstructive bowel gas pattern. PHYSICAL EXAM:    Physical Exam  Vitals and nursing note reviewed. Constitutional:       Comments: More interactive with provider today   HENT:      Head: Normocephalic and atraumatic. Eyes:      General:         Right eye: No discharge. Left eye: No discharge. Conjunctiva/sclera: Conjunctivae normal.   Cardiovascular:      Rate and Rhythm: Normal rate and regular rhythm. Pulmonary:      Effort: No respiratory distress. Breath sounds: No wheezing. Abdominal:      General: Bowel sounds are normal. There is no distension. Tenderness: There is no abdominal tenderness. Comments: PEG tube in place   Genitourinary:     Comments: Weinberg present  Musculoskeletal:         General: No tenderness. Right lower leg: No edema. Left lower leg: No edema. Comments: Upper extremities:  Left: adducted and flexed against chest   Right: abducted and flexed against chest    Lower extremities: bilaterally internally rotated and flexed   Skin:     General: Skin is warm. Capillary Refill: Capillary refill takes less than 2 seconds. Neurological:      Mental Status: He is alert. He is disoriented.       Comments: Nonverbal at baseline   Psychiatric:      Comments: Unable to assess due to mental acuity        Data Review    Recent Results (from the past 24 hour(s))   PHENYTOIN    Collection Time: 01/01/22  5:30 PM   Result Value Ref Range    Phenytoin 4.5 (L) 10 - 20 ug/mL   METABOLIC PANEL, COMPREHENSIVE    Collection Time: 01/02/22  8:16 AM   Result Value Ref Range    Sodium 136 136 - 145 mmol/L    Potassium 3.8 3.5 - 5.1 mmol/L    Chloride 104 97 - 108 mmol/L    CO2 25 21 - 32 mmol/L    Anion gap 7 5 - 15 mmol/L    Glucose 134 (H) 65 - 100 mg/dL    BUN 6 6 - 20 mg/dL    Creatinine 0.64 (L) 0.70 - 1.30 mg/dL    BUN/Creatinine ratio 9 (L) 12 - 20      GFR est AA >60 >60 ml/min/1.73m2    GFR est non-AA >60 >60 ml/min/1.73m2    Calcium 9.0 8.5 - 10.1 mg/dL    Bilirubin, total 0.2 0.2 - 1.0 mg/dL    AST (SGOT) 22 15 - 37 U/L    ALT (SGPT) 25 12 - 78 U/L    Alk. phosphatase 172 (H) 45 - 117 U/L    Protein, total 7.0 6.4 - 8.2 g/dL    Albumin 2.7 (L) 3.5 - 5.0 g/dL    Globulin 4.3 (H) 2.0 - 4.0 g/dL    A-G Ratio 0.6 (L) 1.1 - 2.2     CBC WITH AUTOMATED DIFF    Collection Time: 01/02/22  8:16 AM   Result Value Ref Range    WBC 8.7 4.1 - 11.1 K/uL    RBC 3.68 (L) 4.10 - 5.70 M/uL    HGB 11.1 (L) 12.1 - 17.0 g/dL    HCT 35.1 (L) 36.6 - 50.3 %    MCV 95.4 80.0 - 99.0 FL    MCH 30.2 26.0 - 34.0 PG    MCHC 31.6 30.0 - 36.5 g/dL    RDW 13.4 11.5 - 14.5 %    PLATELET 149 875 - 624 K/uL    MPV 9.1 8.9 - 12.9 FL    NRBC 0.0 0.0  WBC    ABSOLUTE NRBC 0.00 0.00 - 0.01 K/uL    NEUTROPHILS 67 32 - 75 %    LYMPHOCYTES 18 12 - 49 %    MONOCYTES 9 5 - 13 %    EOSINOPHILS 5 0 - 7 %    BASOPHILS 1 0 - 1 %    IMMATURE GRANULOCYTES 0 0 - 0.5 %    ABS. NEUTROPHILS 5.8 1.8 - 8.0 K/UL    ABS. LYMPHOCYTES 1.6 0.8 - 3.5 K/UL    ABS. MONOCYTES 0.8 0.0 - 1.0 K/UL    ABS. EOSINOPHILS 0.4 0.0 - 0.4 K/UL    ABS. BASOPHILS 0.0 0.0 - 0.1 K/UL    ABS. IMM. GRANS. 0.0 0.00 - 0.04 K/UL    DF AUTOMATED          Assessment/Plan:     Active Problems:    Sepsis ([facility] (12/17/2021)      Aspiration pneumonia ([facility] (12/17/2021)      Respiratory failure with hypoxia ([facility] (12/21/2021)      DVT (deep venous thrombosis) ([facility] (12/23/2021)        Hospital Course:    Stacy Leigh is a 43-year-old male with a PMH significant for intellectual disabilities, seizure disorder, quadriplegia who lives in a group home who presented with a 4 day history of anorexia, constipation s/p enema, and vomiting. In emergency room he was hypertensive, febrile, tachycardic and altered from baseline.  Chest x-ray consistent with aspiration pneumonia. CT abd/pelvis Admitted for further management of sepsis, aspiration pneumonia, respiratory failure with hypoxia, and breakthrough seizure activity. Blood pressure decreasing required ICU admission and septic shock and started on Levophed. Started on vancomycin and Zosyn for aspiration pneumonia. Required nasal cannula oxygen for acute hypoxic respiratory failure. Weaned off pressor therapy and transferred to medical floor for further management. Doppler study positive for left external iliac vein thrombosis, left common femoral vein thrombosis and left proximal femoral vein thrombosis. He was started on Eliquis. Decreased antibiotic to monotherapy IV Zosyn. Started on Ketotifen and prednisone eyedrop for left sclera edema. Due to high risk of aspiration secondary to chronic disabilities and anatomical swallowing weakness, inability to maintain hydration and nutrition status he would benefit from feeding tube placement. GI has been consulted. PEG tube placed on 12/27/21. Heparin drip for bridge therapy while holding Eliquis. On the night of 12/28/2021 patient began vomiting tube feeds and was tachycardic/tachypneic. Concern for sepsis secondary aspiration pneumonia. Started back on IV vancomycin and Zosyn. Repeat chest x-ray showed no developing consolidation but does show persistent postinflammatory or related to prior aspiration reticular nodule pattern. Patient did not have a white count or elevated inflammatory markers. Patient was initially in the 80's saturating but is now normal. Vancomycin and Zosyn discontinued and started on Augmentin for 14 doses. Decreasing phenytoin level from prior study during hospitalization. Patient had breakthrough seizure Dr. Titus Flow advised to increase Dilantin dosage to 130 mg twice daily. Unable to act with patient's neurologist in Dr. Km Wu who can be called at [phone].     Acute hypoxic respiratory failure secondary to aspiration pneumonia  Septic shock - resolved off pressors  S/p zosyn x10 days   Continue on Augmentin for 14 doses  Strict aspiration precautions in place  12/18 blood: negative, final  12/29 blood: NGTD, prelim  ID following    Seizure disorder, breakthrough seizure  Continue with keppra and lamictal   Will increase dilantin to 130mg BID    Neurology consulted and has signed off    Intellectual disability / quadriplegia  History of dysphagia  PEG tube placed on 12/27  Per case management patient cannot return back to the group home  GI/nutrition/ST following   PT/OT to eval    DVT  Venous doppler positive for DVT  Continue on Eliquis    Hypertension  Continue on midodrine 5 mg twice daily    Left sclera edema  Continue on ketotifen and prednisolone drops    DVT Prophylaxis: eliquis  GI Prophylaxis: protonix  Discharge and disposition barriers: >48hrs. Per mom there is a agency that accepted him in having a group meeting on Monday around 1 PM. Last documentation was from 12/29 saying that it could take up to 3 weeks to have acceptance process for new group home. Care Plan discussed with: Patient/Family, Nurse and     Total time spent with patient: 35 minutes.

## 2022-01-02 NOTE — PROGRESS NOTES
Infectious Disease Progress Note           Subjective:   Stable, had a witnessed seizure this morning, no acute events since last seen. Break through seizure this morning per staff   Objective:   Physical Exam:     Visit Vitals  /70 (BP 1 Location: Right arm, BP Patient Position: At rest)   Pulse 93   Temp 98.7 °F (37.1 °C)   Resp 17   Ht 5' (1.524 m)   Wt 132 lb 4.8 oz (60 kg)   SpO2 92%   BMI 25.84 kg/m²    O2 Flow Rate (L/min): 2 l/min O2 Device: None (Room air)    Temp (24hrs), Av.2 °F (36.8 °C), Min:97.3 °F (36.3 °C), Max:98.9 °F (37.2 °C)    No intake/output data recorded.     1901 -  0700  In: 95420.8 [I.V.:7720.8]  Out: 3000 [ZFHBB:5068]    General: NAD, alert, non-verbal   HEENT: ALEJO, Moist mucosa, decreased left sclera erythema  Lungs: CTA b/l, decreased at the bases, no wheeze/rhonchi   Heart: S1S2+, RRR, no murmur  Abdo: Soft, NT, ND, +BS, +Peg tube   Exts: Contracted exts, some leg swelling   Skin: No wounds, No rashes or lesions    Data Review:       Recent Days:  Recent Labs     22  0816 22  0728 21  0802   WBC 8.7 8.9 8.7   HGB 11.1* 11.3* 11.0*   HCT 35.1* 35.3* 34.7*    349 337     Recent Labs     22  0816 22  0728 21  0802   BUN 6 4* 4*   CREA 0.64* 0.63* 0.70       Lab Results   Component Value Date/Time    C-Reactive protein 6.44 (H) 2021 05:00 AM        Microbiology     Results     Procedure Component Value Units Date/Time    CULTURE, BLOOD [[id number]] Collected: 21 0721    Order Status: Completed Specimen: Blood Updated: 22 1019     Special Requests: --        No Special Requests  Left  Hand       Culture result: No growth 3 days       CULTURE, BLOOD [[id number]] Collected: 21 0720    Order Status: Completed Specimen: Blood Updated: 22 1019     Special Requests: No Special Requests        Culture result: No growth 4 days           Diagnostics   CXR Results  (Last 48 hours)    None Assessment/Plan     1. Aspiration pneumonitis, H/o dysphagia w chronic aspiration      Clear lungs on exam, maintaining O2 sats on RA, no cough       Afebrile w a normal WBC on routine labs       On day # 3/7 of Augmentin. Routine labs in the morning         2. Dysphagia/poor oral intake: S/p peg tube placement on 12/27      On tube feeds, mother will like to pt to have oral feeds occasionally       3. H/o seizure d/o, had a break through seizure earlier this morning     4. Acute left leg DVT, anticoagulated on Eliquis      5.  Left sclera erythema, Improved on steroid drops and Erythromycin oint     Cleared for d/c from ID stand point     Phillip Linn MD    1/2/2022

## 2022-01-02 NOTE — PROGRESS NOTES
PHYSICAL THERAPY EVALUATION  Patient: Jalil Rios (66 y.o. male)  Date: 1/2/2022  Primary Diagnosis: Aspiration pneumonia ([address] [J69.0]  Procedure(s) (LRB):  ESOPHAGOGASTRODUODENOSCOPY (EGD) (N/A)  PERCUTANEOUS ENDOSCOPIC GASTROSTOMY TUBE INSERTION (N/A) 6 Days Post-Op   Precautions: positioning to prevent bed sores. ASSESSMENT  As per PA notes(Ang Marks is a 59-year-old male with a PMH significant for intellectual disabilities, seizure disorder, quadriplegia who lives in a group home who presented with a 4 day history of anorexia, constipation s/p enema, and vomiting. In emergency room he was hypertensive, febrile, tachycardic and altered from baseline. Chest x-ray consistent with aspiration pneumonia. CT abd/pelvis Admitted for further management of sepsis, aspiration pneumonia, respiratory failure with hypoxia, and breakthrough seizure activity. Blood pressure decreasing required ICU admission and septic shock and started on Levophed. Started on vancomycin and Zosyn for aspiration pneumonia. Required nasal cannula oxygen for acute hypoxic respiratory failure. Weaned off pressor therapy and transferred to medical floor for further management. Doppler study positive for left external iliac vein thrombosis, left common femoral vein thrombosis and left proximal femoral vein thrombosis. He was started on Eliquis. Decreased antibiotic to monotherapy IV Zosyn. Started on Ketotifen and prednisone eyedrop for left sclera edema. Due to high risk of aspiration secondary to chronic disabilities and anatomical swallowing weakness, inability to maintain hydration and nutrition status he would benefit from feeding tube placement. GI has been consulted. PEG tube placed on 12/27/21. Heparin drip for bridge therapy while holding Eliquis. On the night of 12/28/2021 patient began vomiting tube feeds and was tachycardic/tachypneic. Concern for sepsis secondary aspiration pneumonia.  Started back on IV vancomycin and Zosyn. Repeat chest x-ray showed no developing consolidation but does show persistent postinflammatory or related to prior aspiration reticular nodule pattern. Patient did not have a white count or elevated inflammatory markers. Patient was initially in the 80's saturating but is now normal. Vancomycin and Zosyn discontinued and started on Augmentin for 14 doses. Decreasing phenytoin level from prior study during hospitalization. Patient had breakthrough seizure Dr. Mitch Morataya advised to increase Dilantin dosage to 130 mg twice daily. Unable to act with patient's neurologist in Dr. Sy Clinton who can be called at [phone].)  Patient was found in the bed with all 4 exts flexed/contracted. patient was accompanied by mother. As per mother patient came from group home but not going back to same group home and waiting for new group home to accept him. Patient calm. Mother had concern that at baseline patient was getting in chair at group home and his les were straighter than this. She is worried about getting further deformities than before. She reported those concerns to nursing and that's why they ordered therapy. Patient in bed with very flexed ,abducted,ER les. after making a repo with patient he allowed the therapist to performed gentle passive stretch to raina les. therapist was able to stretch the les half way straight before patient becoming uncomfortable. left heel redness noted w/o blister or open wound so therapist got the bunny boots to prevent further damage. Mother happy with the therapist visit and wanting us to cont working with him if possible to prevent contractures as much as possible. Therapist kept blankets under knees to prevent full flexion. While therapist went to get the bunny boots, patient had an episode of seizure. Nursing working on it         PLAN :  Recommendations and Planned Interventions: bed mobility training, transfer training, gait training, patient and family training/education, and therapeutic activities      Recommend with staff: can position les with pillows and blankets to avoid stiff ness and contractures. Frequency/Duration: Patient will be followed by physical therapy:  1-2x/week to address goals. Recommendation for discharge: (in order for the patient to meet his/her long term goals)  Group Home    This discharge recommendation:  Has been made in collaboration with the attending provider and/or case management    IF patient discharges home will need the following DME: to be determined (TBD)         SUBJECTIVE:   Patient stated Patient is nonverbal    OBJECTIVE DATA SUMMARY:   HISTORY:    Past Medical History:   Diagnosis Date    Intellectual disability     Quadriplegia, unspecified ([facility] [address]     Flexion contractures upper and lower extremities    Seizure disorder ([facility] [address]    History reviewed. No pertinent surgical history. Home Situation  Home Environment: Group home  One/Two Story Residence: Other (Comment)  Living Alone: No  Support Systems: Adult Group Home,Parent(s)  Patient Expects to be Discharged to[de-identified] Group home  Current DME Used/Available at Home: Other (comment) (unknown)    EXAMINATION/PRESENTATION/DECISION MAKING:   Critical Behavior:  Neurologic State: Alert  Orientation Level: Unable to verbalize  Cognition: Unable to assess (comment)     Hearing: Auditory  Auditory Impairment: None  Skin:  left heel redness medially  Range Of Motion:  AROM: Grossly decreased, non-functional           PROM: Generally decreased, functional           Strength:    Strength: Grossly decreased, non-functional                         Functional Mobility:  Bed Mobility:  Rolling: Total assistance                    Therapeutic Exercises:   Passive rom BLE    Functional Measure:  [facility] Mobility Inpatient Short Form  How much difficulty does the patient currently have. .. Unable A Lot A Little None   1.   Turning over in bed (including adjusting bedclothes, sheets and blankets)? [x] 1   [] 2   [] 3   [] 4   2. Sitting down on and standing up from a chair with arms ( e.g., wheelchair, bedside commode, etc.)   [x] 1   [] 2   [] 3   [] 4   3. Moving from lying on back to sitting on the side of the bed? [x] 1   [] 2   [] 3   [] 4          How much help from another person does the patient currently need. .. Total A Lot A Little None   4. Moving to and from a bed to a chair (including a wheelchair)? [x] 1   [] 2   [] 3   [] 4   5. Need to walk in hospital room? [x] 1   [] 2   [] 3   [] 4   6. Climbing 3-5 steps with a railing? [x] 1   [] 2   [] 3   [] 4   © , Trustees of [address] [place] [address], under license to [employer]. All rights reserved     Score:  Initial:  Most Recent: X (Date: 2022 )   Interpretation of Tool:  Represents activities that are increasingly more difficult (i.e. Bed mobility, Transfers, Gait). Score 24 23 22-20 19-15 14-10 9-7 6   Modifier CH CI CJ CK CL CM CN         Physical Therapy Evaluation Charge Determination   History Examination Presentation Decision-Making   LOW Complexity : Zero comorbidities / personal factors that will impact the outcome / POC LOW Complexity : 1-2 Standardized tests and measures addressing body structure, function, activity limitation and / or participation in recreation  LOW Complexity : Stable, uncomplicated  Other outcome measures [facility] 6        Based on the above components, the patient evaluation is determined to be of the following complexity level: LOW     Pain Ratin/10 faces  Activity Tolerance:   Fair    After treatment patient left in no apparent distress:   Supine in bed, Call bell within reach, Bed / chair alarm activated, and Caregiver / family present .     GOALS:    Problem: Mobility Impaired (Adult and Pediatric)  Goal: *Acute Goals and Plan of Care (Insert Text)  Description: Patient will be seen for passive ROM to LE to prevent and restore the rom at LES while [facility]. 1-2 x week  within 7 day(s). Outcome: Not Met       COMMUNICATION/EDUCATION:   The patients plan of care was discussed with: Occupational therapist and Registered nurse. Patient/family agree to work toward stated goals and plan of care. Thank you for this referral.  Dari Levine, PT.    Time Calculation: 33 mins

## 2022-01-03 LAB
ALBUMIN SERPL-MCNC: 2.6 G/DL (ref 3.5–5)
ALBUMIN/GLOB SERPL: 0.6 {RATIO} (ref 1.1–2.2)
ALP SERPL-CCNC: 170 U/L (ref 45–117)
ALT SERPL-CCNC: 24 U/L (ref 12–78)
ANION GAP SERPL CALC-SCNC: 6 MMOL/L (ref 5–15)
AST SERPL W P-5'-P-CCNC: 23 U/L (ref 15–37)
BASOPHILS # BLD: 0.1 K/UL (ref 0–0.1)
BASOPHILS NFR BLD: 1 % (ref 0–1)
BILIRUB SERPL-MCNC: 0.2 MG/DL (ref 0.2–1)
BUN SERPL-MCNC: 8 MG/DL (ref 6–20)
BUN/CREAT SERPL: 14 (ref 12–20)
CA-I BLD-MCNC: 8.8 MG/DL (ref 8.5–10.1)
CHLORIDE SERPL-SCNC: 105 MMOL/L (ref 97–108)
CO2 SERPL-SCNC: 26 MMOL/L (ref 21–32)
CREAT SERPL-MCNC: 0.59 MG/DL (ref 0.7–1.3)
DIFFERENTIAL METHOD BLD: ABNORMAL
EOSINOPHIL # BLD: 0.3 K/UL (ref 0–0.4)
EOSINOPHIL NFR BLD: 4 % (ref 0–7)
ERYTHROCYTE [DISTWIDTH] IN BLOOD BY AUTOMATED COUNT: 13.6 % (ref 11.5–14.5)
GLOBULIN SER CALC-MCNC: 4.4 G/DL (ref 2–4)
GLUCOSE BLD STRIP.AUTO-MCNC: 125 MG/DL (ref 65–117)
GLUCOSE SERPL-MCNC: 109 MG/DL (ref 65–100)
HCT VFR BLD AUTO: 33.1 % (ref 36.6–50.3)
HGB BLD-MCNC: 10.5 G/DL (ref 12.1–17)
IMM GRANULOCYTES # BLD AUTO: 0 K/UL (ref 0–0.04)
IMM GRANULOCYTES NFR BLD AUTO: 0 % (ref 0–0.5)
LYMPHOCYTES # BLD: 1.8 K/UL (ref 0.8–3.5)
LYMPHOCYTES NFR BLD: 22 % (ref 12–49)
MCH RBC QN AUTO: 30.3 PG (ref 26–34)
MCHC RBC AUTO-ENTMCNC: 31.7 G/DL (ref 30–36.5)
MCV RBC AUTO: 95.4 FL (ref 80–99)
MONOCYTES # BLD: 1 K/UL (ref 0–1)
MONOCYTES NFR BLD: 13 % (ref 5–13)
NEUTS SEG # BLD: 4.9 K/UL (ref 1.8–8)
NEUTS SEG NFR BLD: 60 % (ref 32–75)
NRBC # BLD: 0 K/UL (ref 0–0.01)
NRBC BLD-RTO: 0 PER 100 WBC
PERFORMED BY, TECHID: ABNORMAL
PLATELET # BLD AUTO: 363 K/UL (ref 150–400)
PMV BLD AUTO: 9.7 FL (ref 8.9–12.9)
POTASSIUM SERPL-SCNC: 4.1 MMOL/L (ref 3.5–5.1)
PROT SERPL-MCNC: 7 G/DL (ref 6.4–8.2)
RBC # BLD AUTO: 3.47 M/UL (ref 4.1–5.7)
SODIUM SERPL-SCNC: 137 MMOL/L (ref 136–145)
WBC # BLD AUTO: 8.2 K/UL (ref 4.1–11.1)

## 2022-01-03 PROCEDURE — 74011250637 HC RX REV CODE- 250/637: Performed by: STUDENT IN AN ORGANIZED HEALTH CARE EDUCATION/TRAINING PROGRAM

## 2022-01-03 PROCEDURE — 36415 COLL VENOUS BLD VENIPUNCTURE: CPT

## 2022-01-03 PROCEDURE — 99232 SBSQ HOSP IP/OBS MODERATE 35: CPT | Performed by: INTERNAL MEDICINE

## 2022-01-03 PROCEDURE — 74011250637 HC RX REV CODE- 250/637: Performed by: HOSPITALIST

## 2022-01-03 PROCEDURE — 74011250636 HC RX REV CODE- 250/636: Performed by: HOSPITALIST

## 2022-01-03 PROCEDURE — 65270000029 HC RM PRIVATE

## 2022-01-03 PROCEDURE — 85025 COMPLETE CBC W/AUTO DIFF WBC: CPT

## 2022-01-03 PROCEDURE — 82962 GLUCOSE BLOOD TEST: CPT

## 2022-01-03 PROCEDURE — 74011250637 HC RX REV CODE- 250/637: Performed by: NURSE PRACTITIONER

## 2022-01-03 PROCEDURE — 80053 COMPREHEN METABOLIC PANEL: CPT

## 2022-01-03 PROCEDURE — 74011250637 HC RX REV CODE- 250/637: Performed by: INTERNAL MEDICINE

## 2022-01-03 PROCEDURE — 74011250637 HC RX REV CODE- 250/637: Performed by: PHYSICIAN ASSISTANT

## 2022-01-03 RX ADMIN — AMOXICILLIN AND CLAVULANATE POTASSIUM 1 TABLET: 875; 125 TABLET, FILM COATED ORAL at 09:00

## 2022-01-03 RX ADMIN — LAMOTRIGINE 300 MG: 100 TABLET ORAL at 12:07

## 2022-01-03 RX ADMIN — LEVETIRACETAM 1000 MG: 10 INJECTION, SOLUTION INTRAVENOUS at 12:08

## 2022-01-03 RX ADMIN — PREDNISOLONE ACETATE 1 DROP: 10 SUSPENSION/ DROPS OPHTHALMIC at 12:10

## 2022-01-03 RX ADMIN — MIDODRINE HYDROCHLORIDE 5 MG: 5 TABLET ORAL at 18:48

## 2022-01-03 RX ADMIN — FOLIC ACID 1 MG: 1 TABLET ORAL at 12:05

## 2022-01-03 RX ADMIN — PANTOPRAZOLE SODIUM 40 MG: 40 TABLET, DELAYED RELEASE ORAL at 12:08

## 2022-01-03 RX ADMIN — PREDNISOLONE ACETATE 1 DROP: 10 SUSPENSION/ DROPS OPHTHALMIC at 21:52

## 2022-01-03 RX ADMIN — APIXABAN 5 MG: 5 TABLET, FILM COATED ORAL at 12:08

## 2022-01-03 RX ADMIN — KETOTIFEN FUMARATE 1 DROP: 0.35 SOLUTION/ DROPS OPHTHALMIC at 21:52

## 2022-01-03 RX ADMIN — MIDODRINE HYDROCHLORIDE 5 MG: 5 TABLET ORAL at 12:08

## 2022-01-03 RX ADMIN — PHENYTOIN 130 MG: 125 SUSPENSION ORAL at 21:52

## 2022-01-03 RX ADMIN — ERYTHROMYCIN: 5 OINTMENT OPHTHALMIC at 05:37

## 2022-01-03 RX ADMIN — PANTOPRAZOLE SODIUM 40 MG: 40 TABLET, DELAYED RELEASE ORAL at 21:54

## 2022-01-03 RX ADMIN — MIDODRINE HYDROCHLORIDE 5 MG: 5 TABLET ORAL at 08:00

## 2022-01-03 RX ADMIN — PHENYTOIN 130 MG: 125 SUSPENSION ORAL at 12:05

## 2022-01-03 RX ADMIN — LACTULOSE 15 ML: 10 SOLUTION ORAL at 12:05

## 2022-01-03 RX ADMIN — ERYTHROMYCIN: 5 OINTMENT OPHTHALMIC at 21:55

## 2022-01-03 RX ADMIN — AMOXICILLIN AND CLAVULANATE POTASSIUM 1 TABLET: 875; 125 TABLET, FILM COATED ORAL at 21:00

## 2022-01-03 RX ADMIN — CETIRIZINE HYDROCHLORIDE 10 MG: 10 TABLET, FILM COATED ORAL at 12:08

## 2022-01-03 RX ADMIN — ERYTHROMYCIN: 5 OINTMENT OPHTHALMIC at 18:47

## 2022-01-03 RX ADMIN — APIXABAN 5 MG: 5 TABLET, FILM COATED ORAL at 21:54

## 2022-01-03 RX ADMIN — LEVETIRACETAM 1000 MG: 10 INJECTION, SOLUTION INTRAVENOUS at 21:55

## 2022-01-03 RX ADMIN — PSYLLIUM HUSK 1 PACKET: 3.4 POWDER ORAL at 09:00

## 2022-01-03 RX ADMIN — KETOTIFEN FUMARATE 1 DROP: 0.35 SOLUTION/ DROPS OPHTHALMIC at 12:09

## 2022-01-03 RX ADMIN — LAMOTRIGINE 300 MG: 100 TABLET ORAL at 21:54

## 2022-01-03 NOTE — PROGRESS NOTES
Hospitalist Progress Note    Subjective:   Daily Progress Note: 1/3/2022 10:20 AM    Patient resting comfortably in bed. Patient nonverbal at baseline. No acute events overnight. Patient remains responsive and active today.      Current Facility-Administered Medications   Medication Dose Route Frequency    LORazepam (ATIVAN) injection 2 mg  2 mg IntraVENous Q4H PRN    LORazepam (ATIVAN) injection 2 mg  2 mg IntraVENous Q5MIN PRN    phenytoin (DILANTIN) 100 mg/4 mL oral suspension 130 mg  130 mg Oral DAILY    amoxicillin-clavulanate (AUGMENTIN) 875-125 mg per tablet 1 Tablet  1 Tablet Oral Q12H    erythromycin (ILOTYCIN) 5 mg/gram (0.5 %) ophthalmic ointment   Left Eye Q8H    pantoprazole (PROTONIX) tablet 40 mg  40 mg Oral BID    midodrine (PROAMATINE) tablet 5 mg  5 mg Oral TID WITH MEALS    prednisoLONE acetate (PRED FORTE) 1 % ophthalmic suspension 1 Drop  1 Drop Left Eye BID    apixaban (ELIQUIS) tablet 5 mg  5 mg Oral BID    0.9% sodium chloride infusion  75 mL/hr IntraVENous CONTINUOUS    levETIRAcetam in saline (iso-os) (KEPPRA) infusion 1,000 mg  1,000 mg IntraVENous BID    acetaminophen (TYLENOL) suppository 650 mg  650 mg Rectal Q4H PRN    ondansetron (ZOFRAN) injection 4 mg  4 mg IntraVENous Q6H PRN    phenytoin (DILANTIN) 100 mg/4 mL oral suspension 130 mg  130 mg Oral QHS    cetirizine (ZYRTEC) tablet 10 mg  10 mg Oral DAILY    folic acid (FOLVITE) tablet 1 mg  1 mg Oral DAILY    ketotifen (ZADITOR) 0.025 % (0.035 %) ophthalmic solution 1 Drop  1 Drop Both Eyes BID    lactulose (CHRONULAC) 10 gram/15 mL solution 15 mL  10 g Oral DAILY    lamoTRIgine (LaMICtal) tablet 300 mg  300 mg Oral BID    psyllium husk-aspartame (METAMUCIL FIBER) packet 1 Packet  1 Packet Oral DAILY    acetaminophen (TYLENOL) tablet 650 mg  650 mg Oral Q4H PRN    albuterol (PROVENTIL HFA, VENTOLIN HFA, PROAIR HFA) inhaler 2 Puff  2 Puff Inhalation Q4H PRN        Review of Systems  Review of Systems   Unable to perform ROS: Acuity of condition            Objective:     Visit Vitals  /67   Pulse 82   Temp 98 °F (36.7 °C)   Resp 18   Ht 5' (1.524 m)   Wt 60 kg (132 lb 4.8 oz)   SpO2 93%   BMI 25.84 kg/m²    O2 Flow Rate (L/min): 2 l/min O2 Device: None (Room air)    Temp (24hrs), Av.4 °F (36.9 °C), Min:98 °F (36.7 °C), Max:98.7 °F (37.1 °C)      No intake/output data recorded.  1901 -  0700  In: 1800 [I.V.:825]  Out: 700 [Urine:700]    Recent Results (from the past 24 hour(s))   GLUCOSE, POC    Collection Time: 22  8:31 AM   Result Value Ref Range    Glucose (POC) 125 (H) 65 - 117 mg/dL    Performed by Annabel Russell , COMPREHENSIVE    Collection Time: 22 10:50 AM   Result Value Ref Range    Sodium 137 136 - 145 mmol/L    Potassium 4.1 3.5 - 5.1 mmol/L    Chloride 105 97 - 108 mmol/L    CO2 26 21 - 32 mmol/L    Anion gap 6 5 - 15 mmol/L    Glucose 109 (H) 65 - 100 mg/dL    BUN 8 6 - 20 mg/dL    Creatinine 0.59 (L) 0.70 - 1.30 mg/dL    BUN/Creatinine ratio 14 12 - 20      GFR est AA >60 >60 ml/min/1.73m2    GFR est non-AA >60 >60 ml/min/1.73m2    Calcium 8.8 8.5 - 10.1 mg/dL    Bilirubin, total 0.2 0.2 - 1.0 mg/dL    AST (SGOT) 23 15 - 37 U/L    ALT (SGPT) 24 12 - 78 U/L    Alk.  phosphatase 170 (H) 45 - 117 U/L    Protein, total 7.0 6.4 - 8.2 g/dL    Albumin 2.6 (L) 3.5 - 5.0 g/dL    Globulin 4.4 (H) 2.0 - 4.0 g/dL    A-G Ratio 0.6 (L) 1.1 - 2.2     CBC WITH AUTOMATED DIFF    Collection Time: 22 10:50 AM   Result Value Ref Range    WBC 8.2 4.1 - 11.1 K/uL    RBC 3.47 (L) 4.10 - 5.70 M/uL    HGB 10.5 (L) 12.1 - 17.0 g/dL    HCT 33.1 (L) 36.6 - 50.3 %    MCV 95.4 80.0 - 99.0 FL    MCH 30.3 26.0 - 34.0 PG    MCHC 31.7 30.0 - 36.5 g/dL    RDW 13.6 11.5 - 14.5 %    PLATELET 521 142 - 396 K/uL    MPV 9.7 8.9 - 12.9 FL    NRBC 0.0 0.0  WBC    ABSOLUTE NRBC 0.00 0.00 - 0.01 K/uL    NEUTROPHILS 60 32 - 75 %    LYMPHOCYTES 22 12 - 49 %    MONOCYTES 13 5 - 13 % EOSINOPHILS 4 0 - 7 %    BASOPHILS 1 0 - 1 %    IMMATURE GRANULOCYTES 0 0 - 0.5 %    ABS. NEUTROPHILS 4.9 1.8 - 8.0 K/UL    ABS. LYMPHOCYTES 1.8 0.8 - 3.5 K/UL    ABS. MONOCYTES 1.0 0.0 - 1.0 K/UL    ABS. EOSINOPHILS 0.3 0.0 - 0.4 K/UL    ABS. BASOPHILS 0.1 0.0 - 0.1 K/UL    ABS. IMM. GRANS. 0.0 0.00 - 0.04 K/UL    DF AUTOMATED          XR CHEST PORT   Final Result      Single portable AP view compared to December 23, 2021. Generator pack on the   left with electrode extending to the left side of the neck. Suboptimal   inspiratory film compromises visualization of the lower lung fields. Heart size normal. No mediastinal widening or shift. Reticular nodular pattern in the lungs persists and may be postinflammatory or   related to prior aspiration. No developing consolidation. Negative for pulmonary   edema, pleural effusion or pneumothorax. No acute fractures. Negative for subdiaphragmatic free air. XR CHEST PORT   Final Result   1. Nasogastric tube tip overlies the right upper quadrant, likely the gastric   antrum or duodenal bulb. 2. Persistent reticulonodular opacities in both lungs. CTA CHEST W OR W WO CONT   Final Result   1. Extensive bilateral reticulonodular infiltrates throughout both lungs similar   to the previous study although both lungs were not included in their entirety on   the previous study. Findings may represent infectious, inflammatory or   neoplastic process. Recommend follow-up to. No evidence of pulmonary embolus,   aortic aneurysm or aortic dissection. 3. Borderline enlarged gastrohepatic lymph node. 4. Thickened esophagus, recommend clinical evaluation. DUPLEX LOWER EXT VENOUS BILAT   Final Result      XR CHEST PORT   Final Result   No significant interval change. CT ABD PELV W CONT   Final Result      1. Small focus of intraluminal gas within the urinary bladder.  Please correlate   for recent instrumentation or other causes of intraluminal gas, including an infectious process. 2. Somewhat reticular nodular opacities in the lung bases, suboptimally   evaluated due to respiratory motion. Some of these have what appears to be a   tree and bud morphology, suggesting either chronic aspiration or an infectious   or inflammatory small airways disease. Otherwise a fibrotic process should be   considered. This would be better characterized with dedicated chest CT as   clinically warranted. 3. Underdistention versus circumferential wall thickening of the distal   esophagus. 4. Probable left trochanteric bursitis with sterility of the fluid   indeterminate. 5. Probable myositis ossificans surrounding the left hip. Please correlate for   history of prior trauma. 6. Enlarged lymph nodes in the upper abdomen, nonspecific. Comparison with   outside imaging would be helpful, if available, to determine longer term   stability. Otherwise short interval follow-up in 3 months is recommended. XR CHEST SNGL V   Final Result   Patchy opacities within the lungs, nonspecific, but leading differential   considerations include infection or pulmonary edema. Superimposed acute disease   on an underlying chronic interstitial process is also possible. Radiographic   follow-up is recommended to exclude other causes of opacification. XR ABD (KUB)   Final Result   Mildly prominent gas-filled loops of bowel within the central abdomen with   overall nonobstructive bowel gas pattern. PHYSICAL EXAM:    Physical Exam  Vitals and nursing note reviewed. Constitutional:       Comments: More interactive with provider today   HENT:      Head: Normocephalic and atraumatic. Eyes:      General:         Right eye: No discharge. Left eye: No discharge. Conjunctiva/sclera: Conjunctivae normal.   Cardiovascular:      Rate and Rhythm: Normal rate and regular rhythm. Pulmonary:      Effort: No respiratory distress. Breath sounds: No wheezing.    Abdominal: General: Bowel sounds are normal. There is no distension. Tenderness: There is no abdominal tenderness. Comments: PEG tube in place   Genitourinary:     Comments: Weinberg present  Musculoskeletal:         General: No tenderness. Right lower leg: No edema. Left lower leg: No edema. Comments: Upper extremities:  Left: adducted and flexed against chest   Right: abducted and flexed against chest    Lower extremities: bilaterally internally rotated and flexed   Skin:     General: Skin is warm. Capillary Refill: Capillary refill takes less than 2 seconds. Neurological:      Mental Status: He is alert. He is disoriented. Comments: Nonverbal at baseline   Psychiatric:      Comments: Unable to assess due to mental acuity        Data Review    Recent Results (from the past 24 hour(s))   GLUCOSE, POC    Collection Time: 01/03/22  8:31 AM   Result Value Ref Range    Glucose (POC) 125 (H) 65 - 117 mg/dL    Performed by Annabel Maldonado COMPREHENSIVE    Collection Time: 01/03/22 10:50 AM   Result Value Ref Range    Sodium 137 136 - 145 mmol/L    Potassium 4.1 3.5 - 5.1 mmol/L    Chloride 105 97 - 108 mmol/L    CO2 26 21 - 32 mmol/L    Anion gap 6 5 - 15 mmol/L    Glucose 109 (H) 65 - 100 mg/dL    BUN 8 6 - 20 mg/dL    Creatinine 0.59 (L) 0.70 - 1.30 mg/dL    BUN/Creatinine ratio 14 12 - 20      GFR est AA >60 >60 ml/min/1.73m2    GFR est non-AA >60 >60 ml/min/1.73m2    Calcium 8.8 8.5 - 10.1 mg/dL    Bilirubin, total 0.2 0.2 - 1.0 mg/dL    AST (SGOT) 23 15 - 37 U/L    ALT (SGPT) 24 12 - 78 U/L    Alk.  phosphatase 170 (H) 45 - 117 U/L    Protein, total 7.0 6.4 - 8.2 g/dL    Albumin 2.6 (L) 3.5 - 5.0 g/dL    Globulin 4.4 (H) 2.0 - 4.0 g/dL    A-G Ratio 0.6 (L) 1.1 - 2.2     CBC WITH AUTOMATED DIFF    Collection Time: 01/03/22 10:50 AM   Result Value Ref Range    WBC 8.2 4.1 - 11.1 K/uL    RBC 3.47 (L) 4.10 - 5.70 M/uL    HGB 10.5 (L) 12.1 - 17.0 g/dL    HCT 33.1 (L) 36.6 - 50.3 % MCV 95.4 80.0 - 99.0 FL    MCH 30.3 26.0 - 34.0 PG    MCHC 31.7 30.0 - 36.5 g/dL    RDW 13.6 11.5 - 14.5 %    PLATELET 898 303 - 379 K/uL    MPV 9.7 8.9 - 12.9 FL    NRBC 0.0 0.0  WBC    ABSOLUTE NRBC 0.00 0.00 - 0.01 K/uL    NEUTROPHILS 60 32 - 75 %    LYMPHOCYTES 22 12 - 49 %    MONOCYTES 13 5 - 13 %    EOSINOPHILS 4 0 - 7 %    BASOPHILS 1 0 - 1 %    IMMATURE GRANULOCYTES 0 0 - 0.5 %    ABS. NEUTROPHILS 4.9 1.8 - 8.0 K/UL    ABS. LYMPHOCYTES 1.8 0.8 - 3.5 K/UL    ABS. MONOCYTES 1.0 0.0 - 1.0 K/UL    ABS. EOSINOPHILS 0.3 0.0 - 0.4 K/UL    ABS. BASOPHILS 0.1 0.0 - 0.1 K/UL    ABS. IMM. GRANS. 0.0 0.00 - 0.04 K/UL    DF AUTOMATED          Assessment/Plan:     Active Problems:    Sepsis ([facility] [address] (12/17/2021)      Aspiration pneumonia ([facility] [address] (12/17/2021)      Respiratory failure with hypoxia ([facility] [address] (12/21/2021)      DVT (deep venous thrombosis) ([facility] [address] (12/23/2021)        Hospital Course:    Jalil Rios is a 59-year-old male with a PMH significant for intellectual disabilities, seizure disorder, quadriplegia who lives in a group home who presented with a 4 day history of anorexia, constipation s/p enema, and vomiting. In emergency room he was hypertensive, febrile, tachycardic and altered from baseline. Chest x-ray consistent with aspiration pneumonia. CT abd/pelvis Admitted for further management of sepsis, aspiration pneumonia, respiratory failure with hypoxia, and breakthrough seizure activity. Blood pressure decreasing required ICU admission and septic shock and started on Levophed. Started on vancomycin and Zosyn for aspiration pneumonia. Required nasal cannula oxygen for acute hypoxic respiratory failure. Weaned off pressor therapy and transferred to medical floor for further management. Doppler study positive for left external iliac vein thrombosis, left common femoral vein thrombosis and left proximal femoral vein thrombosis. He was started on Eliquis. Decreased antibiotic to monotherapy IV Zosyn. Started on Ketotifen and prednisone eyedrop for left sclera edema. Due to high risk of aspiration secondary to chronic disabilities and anatomical swallowing weakness, inability to maintain hydration and nutrition status he would benefit from feeding tube placement. GI has been consulted. PEG tube placed on 12/27/21. Heparin drip for bridge therapy while holding Eliquis. On the night of 12/28/2021 patient began vomiting tube feeds and was tachycardic/tachypneic. Concern for sepsis secondary aspiration pneumonia. Started back on IV vancomycin and Zosyn. Repeat chest x-ray showed no developing consolidation but does show persistent postinflammatory or related to prior aspiration reticular nodule pattern. Patient did not have a white count or elevated inflammatory markers. Patient was initially in the 80's saturating but is now normal. Vancomycin and Zosyn discontinued and started on Augmentin for 14 doses. Decreasing phenytoin level from prior study during hospitalization. Patient had breakthrough seizure Dr. Martha Roach advised to increase Dilantin dosage to 130 mg twice daily. Unable to act with patient's neurologist in Dr. Juana Jeffries who can be called at [phone].     Acute hypoxic respiratory failure secondary to aspiration pneumonia  Septic shock - resolved off pressors  S/p zosyn x10 days   Continue on Augmentin 4/14  Strict aspiration precautions in place  12/18 blood: negative, final  12/29 blood: NGTD, prelim  ID following    Seizure disorder, breakthrough seizure  Continue with keppra and lamictal   Continue dilantin to 130mg BID    Neurology consulted and has signed off  Primary neurologist Dr. Louie Anglin [phone]    Intellectual disability / quadriplegia  History of dysphagia  PEG tube placed on 12/27  Per case management patient cannot return back to the group home  GI/nutrition/ST following   PT/OT to eval    DVT  Venous doppler positive for DVT  Continue on Eliquis    Hypertension  Continue on midodrine 5 mg twice daily    Left sclera edema  Continue on ketotifen and prednisolone drops    DVT Prophylaxis: eliquis  GI Prophylaxis: protonix  Discharge and disposition barriers: 24-48 hr pending group home acceptance    Care Plan discussed with: Patient/Family, Nurse and     Total time spent with patient: 35 minutes.

## 2022-01-03 NOTE — PROGRESS NOTES
Zoom meeting between patient mother, Sangeetha Alfaroee, and [facility] director to facilitate discharge to new [facility] home. Should be able to accept patient Monday 1/10/2022. Attempting to setup patient with home health for PT/OT. Also requesting an air mattress.

## 2022-01-03 NOTE — PROGRESS NOTES
"    Outpatient Physical Therapy Ortho Treatment Note  [facility]     Patient Name: Brittni Hagen  : 1962  MRN: [id number]  Today's Date: 2017      Visit Date: 2017  Subjective Improvement:No lasting improvement only temporary  Visit Number:     Recert Date:   17  MD Visit:   17  Total Approved Visits:  75 per year      Visit Dx:    ICD-10-CM ICD-9-CM   1. Chronic low back pain with right-sided sciatica, unspecified back pain laterality M54.41 724.2    G89.29 724.3     338.29       Patient Active Problem List   Diagnosis   • Type 2 diabetes mellitus   • Hyperlipidemia   • Essential hypertension   • Gastroesophageal reflux disease without esophagitis        Past Medical History:   Diagnosis Date   • Alpha galactosidase deficiency     red meat allergy; \"alpha gal syndrome\"   • Anemia    • Arthritis    • Asthma    • COPD (chronic obstructive pulmonary disease)    • Diabetes mellitus    • Endometriosis    • Fibromyalgia    • Goiter    • Hyperlipidemia    • Hypertension     takes bp meds to help kidneys   • Neuropathy    • Sleep apnea    • Tinnitus    • Vitamin D deficiency         Past Surgical History:   Procedure Laterality Date   • CARPAL TUNNEL RELEASE Bilateral    • LAPAROSCOPIC TUBAL LIGATION     • TUBAL ABDOMINAL LIGATION               PT Ortho       17 1700    Precautions and Contraindications    Precautions cannot swim  -BB    Transfers    Transfer, Comment No AD, Slow transfers  -BB      User Key  (r) = Recorded By, (t) = Taken By, (c) = Cosigned By    Initials Name Provider Type    OSMEL Ponce PTA Physical Therapy Assistant                            PT Assessment/Plan       17 1836       PT Assessment    Assessment Comments Had some decreased pain after pool.  No change to HEP.   -BB     PT Plan    PT Frequency 2x/week  -BB     Predicted Duration of Therapy Intervention (days/wks) x 4 weeks  -BB     PT Plan Comments 1 more pool visit then " Infectious Disease Progress Note           Subjective:   No change in clinical status, remains stable, more alert but not following commands. No documented fevers   Objective:   Physical Exam:     Visit Vitals  /67   Pulse 82   Temp 98 °F (36.7 °C)   Resp 18   Ht 5' 6\" (1.676 m)   Wt 132 lb 4.8 oz (60 kg)   SpO2 93%   BMI 21.35 kg/m²    O2 Flow Rate (L/min): 2 l/min O2 Device: None (Room air)    Temp (24hrs), Av.3 °F (36.8 °C), Min:98 °F (36.7 °C), Max:98.5 °F (36.9 °C)    No intake/output data recorded.  1901 -  0700  In: 1800 [I.V.:825]  Out: 700 [Urine:700]    General: NAD, alert, non-verbal   HEENT: ALEJO, Moist mucosa, decreased left sclera erythema  Lungs: CTA b/l, decreased at the bases, no wheeze/rhonchi   Heart: S1S2+, RRR, no murmur  Abdo: Soft, NT, ND, +BS, +Peg tube   Exts: Contracted exts, some leg swelling   Skin: No wounds, No rashes or lesions    Data Review:       Recent Days:  Recent Labs     22  1050 22  0816 22  0728   WBC 8.2 8.7 8.9   HGB 10.5* 11.1* 11.3*   HCT 33.1* 35.1* 35.3*    329 349     Recent Labs     22  1050 22  0816 22  0728   BUN 8 6 4*   CREA 0.59* 0.64* 0.63*       Lab Results   Component Value Date/Time    C-Reactive protein 6.44 (H) 2021 05:00 AM        Microbiology     Results     Procedure Component Value Units Date/Time    CULTURE, BLOOD [[id number]] Collected: 21    Order Status: Completed Specimen: Blood Updated: 22 1002     Special Requests: --        No Special Requests  Left  Hand       Culture result: No growth 4 days       CULTURE, BLOOD [[id number]] Collected: 12/29/21 0720    Order Status: Completed Specimen: Blood Updated: 22 1002     Special Requests: No Special Requests        Culture result: No growth 5 days           Diagnostics   CXR Results  (Last 48 hours)    None             Assessment/Plan     1.  Aspiration pneumonitis, H/o dysphagia w chronic aspiration      Maintaining O2 sats in the low to mid 90s on RA, no cough       Remains afebrile w a normal WBC on routine labs       On day # 4/7 of Augmentin. 2. Dysphagia/poor oral intake: S/p peg tube placement on 12/27, tolerating tube feeds       3. H/o seizure d/o, occasional episodes of break through seizure     4. Acute left leg DVT, anticoagulated on Eliquis      5.  Left sclera erythema, improved     Will follow intermittently since no acute ID related issue    Everet Skiff, MD    1/3/2022 continue with land per recheck POC.   -BB       User Key  (r) = Recorded By, (t) = Taken By, (c) = Cosigned By    Initials Name Provider Type    OSMEL Ponce PTA Physical Therapy Assistant                    Exercises       09/05/17 1700          Subjective Comments    Subjective Comments States she had temporary relief after pool. States she walked at [facility] earlier and it increased pain.   MRI results next week  -BB      Subjective Pain    Able to rate subjective pain? yes  -BB      Pre-Treatment Pain Level 9  -BB      Post-Treatment Pain Level 5  -BB      Aquatics    Aquatics performed? Yes  -BB      Exercise 1    Exercise Name 1 AQU FWD/LAT/BWD WALK  -BB      Time (Minutes) 1 5EA  -BB      Exercise 2    Exercise Name 2 AQU SHLD FLEX/EXT  -BB      Time (Minutes) 2 10  -BB      Exercise 3    Exercise Name 3 AQU SHLD HORIZONTAL ABD/ADD  -BB      Time (Minutes) 3 10  -BB      Exercise 4    Exercise Name 4 AQU DEEP BIKE  -BB      Time (Minutes) 4 5  -BB      Exercise 5    Exercise Name 5 AQU DEEP HANG  -BB      Time (Minutes) 5 10  -BB        User Key  (r) = Recorded By, (t) = Taken By, (c) = Cosigned By    Initials Name Provider Type    OSMEL Ponce PTA Physical Therapy Assistant                               PT OP Goals       09/05/17 1700       PT Short Term Goals    STG Date to Achieve 09/21/17  -BB     STG 1 Note a >/= 50% subjective improvement in symptoms  -BB     STG 1 Progress Not Met  -BB     STG 2 Decrease Modified Oswestry score to </= 40%  -BB     STG 2 Progress Not Met  -BB     STG 3 Trunk flexion to fingertips to ankles  -BB     STG 3 Progress Not Met  -BB     Long Term Goals    LTG Date to Achieve 09/28/17  -BB     LTG 1 Independent with HEP  -BB     LTG 1 Progress Not Met  -BB     LTG 2 Patient to note minimal LBP with trunk ROM  -BB     LTG 2 Progress Not Met  -BB     LTG 3 Return to PLOF  -BB     LTG 3 Progress Not Met  -BB     Time Calculation    PT Goal Re-Cert Due Date 09/28/17   -BB       User Key  (r) = Recorded By, (t) = Taken By, (c) = Cosigned By    Initials Name Provider Type    OSMEL Ponce PTA Physical Therapy Assistant                Therapy Education       09/05/17 1835          Therapy Education    Given HEP  -BB      Program Reinforced  -BB      How Provided Verbal  -BB      Provided to Patient  -BB      Level of Understanding Verbalized  -BB        User Key  (r) = Recorded By, (t) = Taken By, (c) = Cosigned By    Initials Name Provider Type    OSMEL Ponce PTA Physical Therapy Assistant                Time Calculation:   Start Time: 1737  Stop Time: 1815  Time Calculation (min): 38 min  Total Timed Code Minutes- PT: 38 minute(s)    Therapy Charges for Today     Code Description Service Date Service Provider Modifiers Qty    [id number] HC PT THER PROC EA 15 MIN 9/5/2017 Torri Ponce PTA GP 3                    Torri Ponce PTA  9/5/2017

## 2022-01-04 LAB
ALBUMIN SERPL-MCNC: 2.6 G/DL (ref 3.5–5)
ALBUMIN/GLOB SERPL: 0.6 {RATIO} (ref 1.1–2.2)
ALP SERPL-CCNC: 161 U/L (ref 45–117)
ALT SERPL-CCNC: 25 U/L (ref 12–78)
ANION GAP SERPL CALC-SCNC: 7 MMOL/L (ref 5–15)
AST SERPL W P-5'-P-CCNC: 22 U/L (ref 15–37)
BACTERIA SPEC CULT: NORMAL
BASOPHILS # BLD: 0.1 K/UL (ref 0–0.1)
BASOPHILS NFR BLD: 1 % (ref 0–1)
BILIRUB SERPL-MCNC: 0.2 MG/DL (ref 0.2–1)
BUN SERPL-MCNC: 8 MG/DL (ref 6–20)
BUN/CREAT SERPL: 13 (ref 12–20)
CA-I BLD-MCNC: 8.8 MG/DL (ref 8.5–10.1)
CHLORIDE SERPL-SCNC: 103 MMOL/L (ref 97–108)
CO2 SERPL-SCNC: 26 MMOL/L (ref 21–32)
COVID-19 RAPID TEST, COVR: NOT DETECTED
CREAT SERPL-MCNC: 0.62 MG/DL (ref 0.7–1.3)
DIFFERENTIAL METHOD BLD: ABNORMAL
EOSINOPHIL # BLD: 0.3 K/UL (ref 0–0.4)
EOSINOPHIL NFR BLD: 4 % (ref 0–7)
ERYTHROCYTE [DISTWIDTH] IN BLOOD BY AUTOMATED COUNT: 13.2 % (ref 11.5–14.5)
GLOBULIN SER CALC-MCNC: 4.4 G/DL (ref 2–4)
GLUCOSE SERPL-MCNC: 117 MG/DL (ref 65–100)
HCT VFR BLD AUTO: 33.2 % (ref 36.6–50.3)
HGB BLD-MCNC: 10.6 G/DL (ref 12.1–17)
IMM GRANULOCYTES # BLD AUTO: 0 K/UL (ref 0–0.04)
IMM GRANULOCYTES NFR BLD AUTO: 0 % (ref 0–0.5)
LYMPHOCYTES # BLD: 1.6 K/UL (ref 0.8–3.5)
LYMPHOCYTES NFR BLD: 19 % (ref 12–49)
MCH RBC QN AUTO: 30.2 PG (ref 26–34)
MCHC RBC AUTO-ENTMCNC: 31.9 G/DL (ref 30–36.5)
MCV RBC AUTO: 94.6 FL (ref 80–99)
MONOCYTES # BLD: 0.8 K/UL (ref 0–1)
MONOCYTES NFR BLD: 10 % (ref 5–13)
NEUTS SEG # BLD: 5.4 K/UL (ref 1.8–8)
NEUTS SEG NFR BLD: 66 % (ref 32–75)
NRBC # BLD: 0 K/UL (ref 0–0.01)
NRBC BLD-RTO: 0 PER 100 WBC
PLATELET # BLD AUTO: 365 K/UL (ref 150–400)
PMV BLD AUTO: 9.2 FL (ref 8.9–12.9)
POTASSIUM SERPL-SCNC: 4 MMOL/L (ref 3.5–5.1)
PROT SERPL-MCNC: 7 G/DL (ref 6.4–8.2)
RBC # BLD AUTO: 3.51 M/UL (ref 4.1–5.7)
SARS-COV-2, COV2: NORMAL
SODIUM SERPL-SCNC: 136 MMOL/L (ref 136–145)
SPECIAL REQUESTS,SREQ: NORMAL
SPECIMEN SOURCE: NORMAL
WBC # BLD AUTO: 8.1 K/UL (ref 4.1–11.1)

## 2022-01-04 PROCEDURE — 74011250636 HC RX REV CODE- 250/636: Performed by: HOSPITALIST

## 2022-01-04 PROCEDURE — 97165 OT EVAL LOW COMPLEX 30 MIN: CPT

## 2022-01-04 PROCEDURE — 97530 THERAPEUTIC ACTIVITIES: CPT

## 2022-01-04 PROCEDURE — 99232 SBSQ HOSP IP/OBS MODERATE 35: CPT | Performed by: INTERNAL MEDICINE

## 2022-01-04 PROCEDURE — 65270000029 HC RM PRIVATE

## 2022-01-04 PROCEDURE — 87635 SARS-COV-2 COVID-19 AMP PRB: CPT

## 2022-01-04 PROCEDURE — 74011250637 HC RX REV CODE- 250/637: Performed by: HOSPITALIST

## 2022-01-04 PROCEDURE — 74011250637 HC RX REV CODE- 250/637: Performed by: INTERNAL MEDICINE

## 2022-01-04 PROCEDURE — 74011250637 HC RX REV CODE- 250/637: Performed by: PHYSICIAN ASSISTANT

## 2022-01-04 PROCEDURE — 85025 COMPLETE CBC W/AUTO DIFF WBC: CPT

## 2022-01-04 PROCEDURE — 74011250637 HC RX REV CODE- 250/637: Performed by: STUDENT IN AN ORGANIZED HEALTH CARE EDUCATION/TRAINING PROGRAM

## 2022-01-04 PROCEDURE — 36415 COLL VENOUS BLD VENIPUNCTURE: CPT

## 2022-01-04 PROCEDURE — 74011250637 HC RX REV CODE- 250/637: Performed by: NURSE PRACTITIONER

## 2022-01-04 PROCEDURE — 80053 COMPREHEN METABOLIC PANEL: CPT

## 2022-01-04 RX ADMIN — ERYTHROMYCIN: 5 OINTMENT OPHTHALMIC at 23:18

## 2022-01-04 RX ADMIN — PHENYTOIN 130 MG: 125 SUSPENSION ORAL at 09:56

## 2022-01-04 RX ADMIN — PHENYTOIN 130 MG: 125 SUSPENSION ORAL at 23:17

## 2022-01-04 RX ADMIN — FOLIC ACID 1 MG: 1 TABLET ORAL at 09:56

## 2022-01-04 RX ADMIN — PREDNISOLONE ACETATE 1 DROP: 10 SUSPENSION/ DROPS OPHTHALMIC at 09:59

## 2022-01-04 RX ADMIN — APIXABAN 5 MG: 5 TABLET, FILM COATED ORAL at 23:18

## 2022-01-04 RX ADMIN — PANTOPRAZOLE SODIUM 40 MG: 40 TABLET, DELAYED RELEASE ORAL at 23:18

## 2022-01-04 RX ADMIN — ERYTHROMYCIN: 5 OINTMENT OPHTHALMIC at 14:16

## 2022-01-04 RX ADMIN — CETIRIZINE HYDROCHLORIDE 10 MG: 10 TABLET, FILM COATED ORAL at 09:00

## 2022-01-04 RX ADMIN — LAMOTRIGINE 300 MG: 100 TABLET ORAL at 23:18

## 2022-01-04 RX ADMIN — AMOXICILLIN AND CLAVULANATE POTASSIUM 1 TABLET: 875; 125 TABLET, FILM COATED ORAL at 09:00

## 2022-01-04 RX ADMIN — AMOXICILLIN AND CLAVULANATE POTASSIUM 1 TABLET: 875; 125 TABLET, FILM COATED ORAL at 21:00

## 2022-01-04 RX ADMIN — LACTULOSE 15 ML: 10 SOLUTION ORAL at 09:56

## 2022-01-04 RX ADMIN — KETOTIFEN FUMARATE 1 DROP: 0.35 SOLUTION/ DROPS OPHTHALMIC at 23:18

## 2022-01-04 RX ADMIN — SODIUM CHLORIDE 75 ML/HR: 9 INJECTION, SOLUTION INTRAVENOUS at 09:04

## 2022-01-04 RX ADMIN — MIDODRINE HYDROCHLORIDE 5 MG: 5 TABLET ORAL at 14:20

## 2022-01-04 RX ADMIN — APIXABAN 5 MG: 5 TABLET, FILM COATED ORAL at 09:55

## 2022-01-04 RX ADMIN — MIDODRINE HYDROCHLORIDE 5 MG: 5 TABLET ORAL at 09:58

## 2022-01-04 RX ADMIN — KETOTIFEN FUMARATE 1 DROP: 0.35 SOLUTION/ DROPS OPHTHALMIC at 09:59

## 2022-01-04 RX ADMIN — MIDODRINE HYDROCHLORIDE 5 MG: 5 TABLET ORAL at 18:30

## 2022-01-04 RX ADMIN — PSYLLIUM HUSK 1 PACKET: 3.4 POWDER ORAL at 09:57

## 2022-01-04 RX ADMIN — LAMOTRIGINE 300 MG: 100 TABLET ORAL at 09:56

## 2022-01-04 RX ADMIN — PANTOPRAZOLE SODIUM 40 MG: 40 TABLET, DELAYED RELEASE ORAL at 09:58

## 2022-01-04 RX ADMIN — ERYTHROMYCIN: 5 OINTMENT OPHTHALMIC at 05:44

## 2022-01-04 RX ADMIN — LEVETIRACETAM 1000 MG: 10 INJECTION, SOLUTION INTRAVENOUS at 09:04

## 2022-01-04 RX ADMIN — PREDNISOLONE ACETATE 1 DROP: 10 SUSPENSION/ DROPS OPHTHALMIC at 23:18

## 2022-01-04 NOTE — PROGRESS NOTES
Hospitalist Progress Note    Subjective:   Daily Progress Note: 1/4/2022 10:20 AM    Patient resting comfortably in bed. No acute events overnight. Patient remains responsive and active today. Patient to be placed on 1/10 to group home. Heart rate is 105 temperatures 100.7 and blood pressure is 100/59.     Current Facility-Administered Medications   Medication Dose Route Frequency    LORazepam (ATIVAN) injection 2 mg  2 mg IntraVENous Q4H PRN    LORazepam (ATIVAN) injection 2 mg  2 mg IntraVENous Q5MIN PRN    phenytoin (DILANTIN) 100 mg/4 mL oral suspension 130 mg  130 mg Oral DAILY    amoxicillin-clavulanate (AUGMENTIN) 875-125 mg per tablet 1 Tablet  1 Tablet Oral Q12H    erythromycin (ILOTYCIN) 5 mg/gram (0.5 %) ophthalmic ointment   Left Eye Q8H    pantoprazole (PROTONIX) tablet 40 mg  40 mg Oral BID    midodrine (PROAMATINE) tablet 5 mg  5 mg Oral TID WITH MEALS    prednisoLONE acetate (PRED FORTE) 1 % ophthalmic suspension 1 Drop  1 Drop Left Eye BID    apixaban (ELIQUIS) tablet 5 mg  5 mg Oral BID    0.9% sodium chloride infusion  75 mL/hr IntraVENous CONTINUOUS    levETIRAcetam in saline (iso-os) (KEPPRA) infusion 1,000 mg  1,000 mg IntraVENous BID    acetaminophen (TYLENOL) suppository 650 mg  650 mg Rectal Q4H PRN    ondansetron (ZOFRAN) injection 4 mg  4 mg IntraVENous Q6H PRN    phenytoin (DILANTIN) 100 mg/4 mL oral suspension 130 mg  130 mg Oral QHS    cetirizine (ZYRTEC) tablet 10 mg  10 mg Oral DAILY    folic acid (FOLVITE) tablet 1 mg  1 mg Oral DAILY    ketotifen (ZADITOR) 0.025 % (0.035 %) ophthalmic solution 1 Drop  1 Drop Both Eyes BID    lactulose (CHRONULAC) 10 gram/15 mL solution 15 mL  10 g Oral DAILY    lamoTRIgine (LaMICtal) tablet 300 mg  300 mg Oral BID    psyllium husk-aspartame (METAMUCIL FIBER) packet 1 Packet  1 Packet Oral DAILY    acetaminophen (TYLENOL) tablet 650 mg  650 mg Oral Q4H PRN    albuterol (PROVENTIL HFA, VENTOLIN HFA, PROAIR HFA) inhaler 2 Puff 2 Puff Inhalation Q4H PRN        Review of Systems  Review of Systems   Unable to perform ROS: Acuity of condition            Objective:     Visit Vitals  BP (!) 100/59 (BP 1 Location: Right lower arm, BP Patient Position: At rest;Lying left side)   Pulse (!) 105   Temp (!) 100.7 °F (38.2 °C)   Resp 20   Ht 5' 6\" (1.676 m)   Wt 74.7 kg (164 lb 10.9 oz)   SpO2 91%   BMI 26.58 kg/m²    O2 Flow Rate (L/min): 2 l/min O2 Device: None (Room air)    Temp (24hrs), Av.4 °F (37.4 °C), Min:98 °F (36.7 °C), Max:100.7 °F (38.2 °C)      No intake/output data recorded.  1901 -  0700  In: -   Out: 700 [Urine:700]    Recent Results (from the past 24 hour(s))   METABOLIC PANEL, COMPREHENSIVE    Collection Time: 22 10:50 AM   Result Value Ref Range    Sodium 137 136 - 145 mmol/L    Potassium 4.1 3.5 - 5.1 mmol/L    Chloride 105 97 - 108 mmol/L    CO2 26 21 - 32 mmol/L    Anion gap 6 5 - 15 mmol/L    Glucose 109 (H) 65 - 100 mg/dL    BUN 8 6 - 20 mg/dL    Creatinine 0.59 (L) 0.70 - 1.30 mg/dL    BUN/Creatinine ratio 14 12 - 20      GFR est AA >60 >60 ml/min/1.73m2    GFR est non-AA >60 >60 ml/min/1.73m2    Calcium 8.8 8.5 - 10.1 mg/dL    Bilirubin, total 0.2 0.2 - 1.0 mg/dL    AST (SGOT) 23 15 - 37 U/L    ALT (SGPT) 24 12 - 78 U/L    Alk.  phosphatase 170 (H) 45 - 117 U/L    Protein, total 7.0 6.4 - 8.2 g/dL    Albumin 2.6 (L) 3.5 - 5.0 g/dL    Globulin 4.4 (H) 2.0 - 4.0 g/dL    A-G Ratio 0.6 (L) 1.1 - 2.2     CBC WITH AUTOMATED DIFF    Collection Time: 22 10:50 AM   Result Value Ref Range    WBC 8.2 4.1 - 11.1 K/uL    RBC 3.47 (L) 4.10 - 5.70 M/uL    HGB 10.5 (L) 12.1 - 17.0 g/dL    HCT 33.1 (L) 36.6 - 50.3 %    MCV 95.4 80.0 - 99.0 FL    MCH 30.3 26.0 - 34.0 PG    MCHC 31.7 30.0 - 36.5 g/dL    RDW 13.6 11.5 - 14.5 %    PLATELET 725 889 - 315 K/uL    MPV 9.7 8.9 - 12.9 FL    NRBC 0.0 0.0  WBC    ABSOLUTE NRBC 0.00 0.00 - 0.01 K/uL    NEUTROPHILS 60 32 - 75 %    LYMPHOCYTES 22 12 - 49 %    MONOCYTES 13 5 - 13 %    EOSINOPHILS 4 0 - 7 %    BASOPHILS 1 0 - 1 %    IMMATURE GRANULOCYTES 0 0 - 0.5 %    ABS. NEUTROPHILS 4.9 1.8 - 8.0 K/UL    ABS. LYMPHOCYTES 1.8 0.8 - 3.5 K/UL    ABS. MONOCYTES 1.0 0.0 - 1.0 K/UL    ABS. EOSINOPHILS 0.3 0.0 - 0.4 K/UL    ABS. BASOPHILS 0.1 0.0 - 0.1 K/UL    ABS. IMM. GRANS. 0.0 0.00 - 0.04 K/UL    DF AUTOMATED     METABOLIC PANEL, COMPREHENSIVE    Collection Time: 01/04/22  8:56 AM   Result Value Ref Range    Sodium 136 136 - 145 mmol/L    Potassium 4.0 3.5 - 5.1 mmol/L    Chloride 103 97 - 108 mmol/L    CO2 26 21 - 32 mmol/L    Anion gap 7 5 - 15 mmol/L    Glucose 117 (H) 65 - 100 mg/dL    BUN 8 6 - 20 mg/dL    Creatinine 0.62 (L) 0.70 - 1.30 mg/dL    BUN/Creatinine ratio 13 12 - 20      GFR est AA >60 >60 ml/min/1.73m2    GFR est non-AA >60 >60 ml/min/1.73m2    Calcium 8.8 8.5 - 10.1 mg/dL    Bilirubin, total 0.2 0.2 - 1.0 mg/dL    AST (SGOT) 22 15 - 37 U/L    ALT (SGPT) 25 12 - 78 U/L    Alk. phosphatase 161 (H) 45 - 117 U/L    Protein, total 7.0 6.4 - 8.2 g/dL    Albumin 2.6 (L) 3.5 - 5.0 g/dL    Globulin 4.4 (H) 2.0 - 4.0 g/dL    A-G Ratio 0.6 (L) 1.1 - 2.2     CBC WITH AUTOMATED DIFF    Collection Time: 01/04/22  8:56 AM   Result Value Ref Range    WBC 8.1 4.1 - 11.1 K/uL    RBC 3.51 (L) 4.10 - 5.70 M/uL    HGB 10.6 (L) 12.1 - 17.0 g/dL    HCT 33.2 (L) 36.6 - 50.3 %    MCV 94.6 80.0 - 99.0 FL    MCH 30.2 26.0 - 34.0 PG    MCHC 31.9 30.0 - 36.5 g/dL    RDW 13.2 11.5 - 14.5 %    PLATELET 290 817 - 379 K/uL    MPV 9.2 8.9 - 12.9 FL    NRBC 0.0 0.0  WBC    ABSOLUTE NRBC 0.00 0.00 - 0.01 K/uL    NEUTROPHILS 66 32 - 75 %    LYMPHOCYTES 19 12 - 49 %    MONOCYTES 10 5 - 13 %    EOSINOPHILS 4 0 - 7 %    BASOPHILS 1 0 - 1 %    IMMATURE GRANULOCYTES 0 0 - 0.5 %    ABS. NEUTROPHILS 5.4 1.8 - 8.0 K/UL    ABS. LYMPHOCYTES 1.6 0.8 - 3.5 K/UL    ABS. MONOCYTES 0.8 0.0 - 1.0 K/UL    ABS. EOSINOPHILS 0.3 0.0 - 0.4 K/UL    ABS. BASOPHILS 0.1 0.0 - 0.1 K/UL    ABS. IMM.  GRANS. 0.0 0.00 - 0.04 K/UL    DF AUTOMATED          XR CHEST PORT   Final Result      Single portable AP view compared to December 23, 2021. Generator pack on the   left with electrode extending to the left side of the neck. Suboptimal   inspiratory film compromises visualization of the lower lung fields. Heart size normal. No mediastinal widening or shift. Reticular nodular pattern in the lungs persists and may be postinflammatory or   related to prior aspiration. No developing consolidation. Negative for pulmonary   edema, pleural effusion or pneumothorax. No acute fractures. Negative for subdiaphragmatic free air. XR CHEST PORT   Final Result   1. Nasogastric tube tip overlies the right upper quadrant, likely the gastric   antrum or duodenal bulb. 2. Persistent reticulonodular opacities in both lungs. CTA CHEST W OR W WO CONT   Final Result   1. Extensive bilateral reticulonodular infiltrates throughout both lungs similar   to the previous study although both lungs were not included in their entirety on   the previous study. Findings may represent infectious, inflammatory or   neoplastic process. Recommend follow-up to. No evidence of pulmonary embolus,   aortic aneurysm or aortic dissection. 3. Borderline enlarged gastrohepatic lymph node. 4. Thickened esophagus, recommend clinical evaluation. DUPLEX LOWER EXT VENOUS BILAT   Final Result      XR CHEST PORT   Final Result   No significant interval change. CT ABD PELV W CONT   Final Result      1. Small focus of intraluminal gas within the urinary bladder. Please correlate   for recent instrumentation or other causes of intraluminal gas, including an   infectious process. 2. Somewhat reticular nodular opacities in the lung bases, suboptimally   evaluated due to respiratory motion. Some of these have what appears to be a   tree and bud morphology, suggesting either chronic aspiration or an infectious   or inflammatory small airways disease.  Otherwise a fibrotic process should be   considered. This would be better characterized with dedicated chest CT as   clinically warranted. 3. Underdistention versus circumferential wall thickening of the distal   esophagus. 4. Probable left trochanteric bursitis with sterility of the fluid   indeterminate. 5. Probable myositis ossificans surrounding the left hip. Please correlate for   history of prior trauma. 6. Enlarged lymph nodes in the upper abdomen, nonspecific. Comparison with   outside imaging would be helpful, if available, to determine longer term   stability. Otherwise short interval follow-up in 3 months is recommended. XR CHEST SNGL V   Final Result   Patchy opacities within the lungs, nonspecific, but leading differential   considerations include infection or pulmonary edema. Superimposed acute disease   on an underlying chronic interstitial process is also possible. Radiographic   follow-up is recommended to exclude other causes of opacification. XR ABD (KUB)   Final Result   Mildly prominent gas-filled loops of bowel within the central abdomen with   overall nonobstructive bowel gas pattern. PHYSICAL EXAM:    Physical Exam  Vitals and nursing note reviewed. Constitutional:       Comments: More interactive with provider today   HENT:      Head: Normocephalic and atraumatic. Eyes:      General:         Right eye: No discharge. Left eye: No discharge. Conjunctiva/sclera: Conjunctivae normal.   Cardiovascular:      Rate and Rhythm: Normal rate and regular rhythm. Pulmonary:      Effort: No respiratory distress. Breath sounds: No wheezing. Abdominal:      General: Bowel sounds are normal. There is no distension. Tenderness: There is no abdominal tenderness. Comments: PEG tube in place   Genitourinary:     Comments: Weinberg present  Musculoskeletal:         General: No tenderness. Right lower leg: No edema. Left lower leg: No edema.       Comments: Upper extremities:  Left: adducted and flexed against chest   Right: abducted and flexed against chest    Lower extremities: bilaterally internally rotated and flexed   Skin:     General: Skin is warm. Capillary Refill: Capillary refill takes less than 2 seconds. Neurological:      Mental Status: He is alert. He is disoriented. Comments: Nonverbal at baseline   Psychiatric:      Comments: Unable to assess due to mental acuity        Data Review    Recent Results (from the past 24 hour(s))   METABOLIC PANEL, COMPREHENSIVE    Collection Time: 01/03/22 10:50 AM   Result Value Ref Range    Sodium 137 136 - 145 mmol/L    Potassium 4.1 3.5 - 5.1 mmol/L    Chloride 105 97 - 108 mmol/L    CO2 26 21 - 32 mmol/L    Anion gap 6 5 - 15 mmol/L    Glucose 109 (H) 65 - 100 mg/dL    BUN 8 6 - 20 mg/dL    Creatinine 0.59 (L) 0.70 - 1.30 mg/dL    BUN/Creatinine ratio 14 12 - 20      GFR est AA >60 >60 ml/min/1.73m2    GFR est non-AA >60 >60 ml/min/1.73m2    Calcium 8.8 8.5 - 10.1 mg/dL    Bilirubin, total 0.2 0.2 - 1.0 mg/dL    AST (SGOT) 23 15 - 37 U/L    ALT (SGPT) 24 12 - 78 U/L    Alk. phosphatase 170 (H) 45 - 117 U/L    Protein, total 7.0 6.4 - 8.2 g/dL    Albumin 2.6 (L) 3.5 - 5.0 g/dL    Globulin 4.4 (H) 2.0 - 4.0 g/dL    A-G Ratio 0.6 (L) 1.1 - 2.2     CBC WITH AUTOMATED DIFF    Collection Time: 01/03/22 10:50 AM   Result Value Ref Range    WBC 8.2 4.1 - 11.1 K/uL    RBC 3.47 (L) 4.10 - 5.70 M/uL    HGB 10.5 (L) 12.1 - 17.0 g/dL    HCT 33.1 (L) 36.6 - 50.3 %    MCV 95.4 80.0 - 99.0 FL    MCH 30.3 26.0 - 34.0 PG    MCHC 31.7 30.0 - 36.5 g/dL    RDW 13.6 11.5 - 14.5 %    PLATELET 597 028 - 338 K/uL    MPV 9.7 8.9 - 12.9 FL    NRBC 0.0 0.0  WBC    ABSOLUTE NRBC 0.00 0.00 - 0.01 K/uL    NEUTROPHILS 60 32 - 75 %    LYMPHOCYTES 22 12 - 49 %    MONOCYTES 13 5 - 13 %    EOSINOPHILS 4 0 - 7 %    BASOPHILS 1 0 - 1 %    IMMATURE GRANULOCYTES 0 0 - 0.5 %    ABS. NEUTROPHILS 4.9 1.8 - 8.0 K/UL    ABS.  LYMPHOCYTES 1.8 0.8 - 3.5 K/UL    ABS. MONOCYTES 1.0 0.0 - 1.0 K/UL    ABS. EOSINOPHILS 0.3 0.0 - 0.4 K/UL    ABS. BASOPHILS 0.1 0.0 - 0.1 K/UL    ABS. IMM. GRANS. 0.0 0.00 - 0.04 K/UL    DF AUTOMATED     METABOLIC PANEL, COMPREHENSIVE    Collection Time: 01/04/22  8:56 AM   Result Value Ref Range    Sodium 136 136 - 145 mmol/L    Potassium 4.0 3.5 - 5.1 mmol/L    Chloride 103 97 - 108 mmol/L    CO2 26 21 - 32 mmol/L    Anion gap 7 5 - 15 mmol/L    Glucose 117 (H) 65 - 100 mg/dL    BUN 8 6 - 20 mg/dL    Creatinine 0.62 (L) 0.70 - 1.30 mg/dL    BUN/Creatinine ratio 13 12 - 20      GFR est AA >60 >60 ml/min/1.73m2    GFR est non-AA >60 >60 ml/min/1.73m2    Calcium 8.8 8.5 - 10.1 mg/dL    Bilirubin, total 0.2 0.2 - 1.0 mg/dL    AST (SGOT) 22 15 - 37 U/L    ALT (SGPT) 25 12 - 78 U/L    Alk. phosphatase 161 (H) 45 - 117 U/L    Protein, total 7.0 6.4 - 8.2 g/dL    Albumin 2.6 (L) 3.5 - 5.0 g/dL    Globulin 4.4 (H) 2.0 - 4.0 g/dL    A-G Ratio 0.6 (L) 1.1 - 2.2     CBC WITH AUTOMATED DIFF    Collection Time: 01/04/22  8:56 AM   Result Value Ref Range    WBC 8.1 4.1 - 11.1 K/uL    RBC 3.51 (L) 4.10 - 5.70 M/uL    HGB 10.6 (L) 12.1 - 17.0 g/dL    HCT 33.2 (L) 36.6 - 50.3 %    MCV 94.6 80.0 - 99.0 FL    MCH 30.2 26.0 - 34.0 PG    MCHC 31.9 30.0 - 36.5 g/dL    RDW 13.2 11.5 - 14.5 %    PLATELET 228 292 - 805 K/uL    MPV 9.2 8.9 - 12.9 FL    NRBC 0.0 0.0  WBC    ABSOLUTE NRBC 0.00 0.00 - 0.01 K/uL    NEUTROPHILS 66 32 - 75 %    LYMPHOCYTES 19 12 - 49 %    MONOCYTES 10 5 - 13 %    EOSINOPHILS 4 0 - 7 %    BASOPHILS 1 0 - 1 %    IMMATURE GRANULOCYTES 0 0 - 0.5 %    ABS. NEUTROPHILS 5.4 1.8 - 8.0 K/UL    ABS. LYMPHOCYTES 1.6 0.8 - 3.5 K/UL    ABS. MONOCYTES 0.8 0.0 - 1.0 K/UL    ABS. EOSINOPHILS 0.3 0.0 - 0.4 K/UL    ABS. BASOPHILS 0.1 0.0 - 0.1 K/UL    ABS. IMM.  GRANS. 0.0 0.00 - 0.04 K/UL    DF AUTOMATED          Assessment/Plan:     Active Problems:    Sepsis ([facility] (12/17/2021)      Aspiration pneumonia ([facility] (12/17/2021) Respiratory failure with hypoxia ([facility] (12/21/2021)      DVT (deep venous thrombosis) ([facility] (12/23/2021)        Hospital Course:    Aramis Smith is a 80-year-old male with a PMH significant for intellectual disabilities, seizure disorder, quadriplegia who lives in a group home who presented with a 4 day history of anorexia, constipation s/p enema, and vomiting. In emergency room he was hypertensive, febrile, tachycardic and altered from baseline. Chest x-ray consistent with aspiration pneumonia. CT abd/pelvis Admitted for further management of sepsis, aspiration pneumonia, respiratory failure with hypoxia, and breakthrough seizure activity. Blood pressure decreasing required ICU admission and septic shock and started on Levophed. Started on vancomycin and Zosyn for aspiration pneumonia. Required nasal cannula oxygen for acute hypoxic respiratory failure. Weaned off pressor therapy and transferred to medical floor for further management. Doppler study positive for left external iliac vein thrombosis, left common femoral vein thrombosis and left proximal femoral vein thrombosis. He was started on Eliquis. Decreased antibiotic to monotherapy IV Zosyn. Started on Ketotifen and prednisone eyedrop for left sclera edema. Due to high risk of aspiration secondary to chronic disabilities and anatomical swallowing weakness, inability to maintain hydration and nutrition status he would benefit from feeding tube placement. GI has been consulted. PEG tube placed on 12/27/21. Heparin drip for bridge therapy while holding Eliquis. On the night of 12/28/2021 patient began vomiting tube feeds and was tachycardic/tachypneic. Concern for sepsis secondary aspiration pneumonia. Started back on IV vancomycin and Zosyn. Repeat chest x-ray showed no developing consolidation but does show persistent postinflammatory or related to prior aspiration reticular nodule pattern.  Patient did not have a white count or elevated inflammatory markers. Patient was initially in the 80's saturating but is now normal. Vancomycin and Zosyn discontinued and started on Augmentin for 14 doses. Decreasing phenytoin level from prior study during hospitalization. Patient had breakthrough seizure Dr. Prince Emmanuel advised to increase Dilantin dosage to 130 mg twice daily. Unable to act with patient's neurologist in Dr. Lilibeth Castaneda who can be called at [phone]. Patient to be placed on 1/10 to group home. UA pending. Acute hypoxic respiratory failure secondary to aspiration pneumonia  Septic shock - resolved off pressors  S/p zosyn x10 days, continue on Augmentin #5/14 12/18 blood: negative, final  12/29 blood: NGTD, prelim  ID following    Seizure disorder, breakthrough seizure  Continue with dilantin, keppra, and lamictal   Neurology signed off    Intellectual disability / quadriplegia  History of dysphagia  PEG tube placed on 12/27    Deep venous thrombosis  Venous doppler positive for DVT  Continue on Eliquis    Hypertension  Continue on midodrine 5 mg twice daily    Left sclera edema  Continue on ketotifen and prednisolone drops    DVT Prophylaxis: eliquis  GI Prophylaxis: protonix  Discharge and disposition barriers: UA pending 1/10 group home transfer    Care Plan discussed with: Patient/Family, Nurse and     Total time spent with patient: 35 minutes.

## 2022-01-04 NOTE — PROGRESS NOTES
OCCUPATIONAL THERAPY EVALUATION  Patient: Delaney Lamar (63 y.o. male)  Date: 1/4/2022  Primary Diagnosis: Aspiration pneumonia ([facility] [J69.0]  Procedure(s) (LRB):  ESOPHAGOGASTRODUODENOSCOPY (EGD) (N/A)  PERCUTANEOUS ENDOSCOPIC GASTROSTOMY TUBE INSERTION (N/A) 8 Days Post-Op   Precautions: fall risk      ASSESSMENT  Pt is a 56 y/o M with PMH of significant for intellectual disabilities, seizure disorder, and quadriplegia who lives in a group home who presented to [facility] with a 4 day history of anorexia, constipation s/p enema, and vomiting, admitted 12/17/21 and being treated for acute hypoxic respiratory failure 2' to aspiration PNA, septic shock, DVT, HTN, left sclera edema and breakthrough seizure activity. Doppler study positive for left external iliac vein thrombosis, left common femoral vein thrombosis and left proximal femoral vein thrombosis. He was started on Eliquis. PEG tube placed 12/27/21. Pt received semi-supine in bed upon arrival with mother present at bedside. Pt currently non-verbal; keeps eyes closed most of session, however does respond/open eyes to verbal/tactile stimuli. Per mother report, PTA pt was living at a group home where he required max-total A with ADLs and was dependent for transfers to chair with alea lift. Per mother/CM notes, plans for pt to attend new group home at d/c with 24/7 care and therapy. Based on current observations, pt presents with deficits in generalized strength/AROM (B UE contracted, hypertonic hyperextended digits and currently non-functional), bed mobility, functional activity tolerance, coordination, and cognition/decreased command following impacting overall performance of ADLs and functional transfers/mobility. Pt currently requires total A for rolling to R, simulated total A LB dressing (donning bunny boots; orders being placed per PT and RN), and total A toileting (bed level at end of evaluation with assist of PCT).  Pt maintains curled up position in bed with flexion contractures and increased tone B UE/B LE limiting overall ADLs/mobility. Attempted basic therapeutic activity with functional reach with item of interest, however pt does not participate at this time. Caregiver educated on assist with B UE HEP with good understanding verbalized. Overall, pt tolerates session fair. Pt would benefit from continued skilled OT services to address current impairments and maximize IND with self cares and functional mobility. At this time, OT recommending d/c to group home with [address] [place] [address] and [facility] once medically appropriate. Other factors to consider for discharge: good family support, DME, time since onset, severity of deficits      Patient will benefit from skilled therapy intervention to address the above noted impairments. PLAN :  Recommendations and Planned Interventions: self care training, functional mobility training, therapeutic exercise, balance training, visual/perceptual training, therapeutic activities, endurance activities, neuromuscular re-education and family training/education    Frequency/Duration: Patient will be followed by occupational therapy:  1-2x/week to address goals. Recommendation for discharge: (in order for the patient to meet his/her long term goals)  Group home with /[facility] and OT    This discharge recommendation:  Has been made in collaboration with the attending provider and/or case management       SUBJECTIVE:   Patient non-verbal throughout evaluation, does look at therapist when name is called and verbal/tactile stimuli provided. OBJECTIVE DATA SUMMARY:   HISTORY:   Past Medical History:   Diagnosis Date    Intellectual disability     Quadriplegia, unspecified ([facility] [address] [facility]     Flexion contractures upper and lower extremities    Seizure disorder ([facility] [address] [facility]    History reviewed. No pertinent surgical history.     Expanded or extensive additional review of patient history:     Home Situation  Home Environment: Group home  One/Two Story Residence: One story  Living Alone: No  Support Systems: Parent(s)  Patient Expects to be Discharged to[de-identified] Group home  Current DME Used/Available at Home: Other (comment) (unknown)    Hand dominance: Right    EXAMINATION OF PERFORMANCE DEFICITS:  Cognitive/Behavioral Status:  Neurologic State: Alert;Lethargic (responds to verbal/tactile stimuli)  Orientation Level: Unable to verbalize  Cognition: No command following    Skin: small area of redness noted on R heel; RN made aware    Edema: dorsal side of R hand slightly swollen    Hearing: Auditory  Auditory Impairment: None      Range of Motion:  AROM: Grossly decreased, non-functional  PROM: Grossly decreased, non-functional (B UE contractures)    Strength:  Strength: Grossly decreased, non-functional       Coordination:  Coordination: Grossly decreased, non-functional  Fine Motor Skills-Upper: Left Impaired;Right Impaired    Gross Motor Skills-Upper: Left Impaired;Right Impaired    Tone & Sensation:  Tone: Abnormal                      Functional Mobility and Transfers for ADLs:  Bed Mobility:  Rolling: Total assistance;Assist x2      ADL Intervention and task modifications:  Feeding  Feeding Assistance: Total assistance (dependent) (PEG tube)    Lower Body Dressing Assistance  Shoes with Velcro: Total assistance (dependent) (Simluated with bunny boots)    Toileting  Toileting Assistance: Total assistance(dependent)         Therapeutic Exercise:  Family educated on UE HEP to complete throughout the day to improve ROM and strength with good understanding verbalized. Functional Measure:    [employer] AM-PACTM \"6 Clicks\"                                                       Daily Activity Inpatient Short Form  How much help from another person does the patient currently need. .. Total; A Lot A Little None   1. Putting on and taking off regular lower body clothing? [x]  1 []  2 []  3 []  4   2. Bathing (including washing, rinsing, drying)?  [x]  1 []  2 []  3 []  4   3. Toileting, which includes using toilet, bedpan or urinal? [x] 1 []  2 []  3 []  4   4. Putting on and taking off regular upper body clothing? [x]  1 []  2 []  3 []  4   5. Taking care of personal grooming such as brushing teeth? [x]  1 []  2 []  3 []  4   6. Eating meals? [x]  1 []  2 []  3 []  4   © , Trustees of [address] [place] [address] [address] [address], under license to [employer]. All rights reserved     Score:      Interpretation of Tool:  Represents clinically-significant functional categories (i.e. Activities of daily living). Percentage of Impairment CH    0%   CI    1-19% CJ    20-39% CK    40-59% CL    60-79% CM    80-99% CN     100%   [facility]  Score 6-24 24 23 20-22 15-19 10-14 7-9 6         Occupational Therapy Evaluation Charge Determination   History Examination Decision-Making   LOW Complexity : Brief history review  HIGH Complexity : 5 or more performance deficits relating to physical, cognitive , or psychosocial skils that result in activity limitations and / or participation restrictions HIGH Complexity : Patient presents with comorbidities that affect occupational performance. Signifigant modification of tasks or assistance (eg, physical or verbal) with assessment (s) is necessary to enable patient to complete evaluation       Based on the above components, the patient evaluation is determined to be of the following complexity level: LOW   Pain Ratin/10    Activity Tolerance:   Fair    After treatment patient left in no apparent distress:    Supine in bed, Heels elevated for pressure relief, Call bell within reach, Caregiver / family present and Side rails x 3, pillow to R side for pressure relief    COMMUNICATION/EDUCATION:   The patients plan of care was discussed with: Physical therapist and Registered nurse. Patient/family have participated as able in goal setting and plan of care. and Patient/family agree to work toward stated goals and plan of care.     This patients plan of care is appropriate for delegation to [facility].     Thank you for this referral.  Leann Leigh  Time Calculation: 35 mins    Problem: Self Care Deficits Care Plan (Adult)  Goal: *Acute Goals and Plan of Care (Insert Text)  Description: Pt will be max A rolling to each side in prep for bed level toileting   Pt will be max A grooming at bed level  Pt will be max A LE dressing sitting EOB/long sit  Pt will be max A toileting/toilet transfer/cloth mgmt LRAD  Pt will be mod A following UE HEP in prep for self care tasks   Outcome: Not Met

## 2022-01-04 NOTE — PROGRESS NOTES
Problem: Pressure Injury - Risk of  Goal: *Prevention of pressure injury  Description: Document Martin Scale and appropriate interventions in the flowsheet. Outcome: Progressing Towards Goal  Note: Pressure Injury Interventions:  Sensory Interventions: Assess changes in LOC    Moisture Interventions: Absorbent underpads,Apply protective barrier, creams and emollients    Activity Interventions: Assess need for specialty bed    Mobility Interventions: Assess need for specialty bed    Nutrition Interventions: Document food/fluid/supplement intake    Friction and Shear Interventions: Apply protective barrier, creams and emollients,HOB 30 degrees or less                Problem: Patient Education: Go to Patient Education Activity  Goal: Patient/Family Education  Outcome: Progressing Towards Goal     Problem: Patient Education: Go to Patient Education Activity  Goal: Patient/Family Education  Outcome: Progressing Towards Goal     Problem: Falls - Risk of  Goal: *Absence of Falls  Description: Document Jossy Fall Risk and appropriate interventions in the flowsheet.   Outcome: Progressing Towards Goal  Note: Fall Risk Interventions:  Mobility Interventions: Bed/chair exit alarm    Mentation Interventions: Adequate sleep, hydration, pain control    Medication Interventions: Bed/chair exit alarm    Elimination Interventions: Bed/chair exit alarm              Problem: Patient Education: Go to Patient Education Activity  Goal: Patient/Family Education  Outcome: Progressing Towards Goal     Problem: Patient Education: Go to Patient Education Activity  Goal: Patient/Family Education  Outcome: Progressing Towards Goal

## 2022-01-04 NOTE — PROGRESS NOTES
Infectious Disease Progress Note           Subjective:   Stable, intermittently febrile, likely from atelectasis, no acute events since last seen   Objective:   Physical Exam:     Visit Vitals  /64   Pulse 96   Temp 98.8 °F (37.1 °C)   Resp 20   Ht 5' 6\" (1.676 m)   Wt 164 lb 10.9 oz (74.7 kg)   SpO2 96%   BMI 26.58 kg/m²    O2 Flow Rate (L/min): 2 l/min O2 Device: None (Room air)    Temp (24hrs), Av.2 °F (37.3 °C), Min:98 °F (36.7 °C), Max:100.7 °F (38.2 °C)    No intake/output data recorded.  1901 -  0700  In: -   Out: 700 [Urine:700]    General: NAD, alert, non-verbal   HEENT: ALEJO, Moist mucosa, decreased left sclera erythema  Lungs: CTA b/l, decreased at the bases, no wheeze/rhonchi   Heart: S1S2+, RRR, no murmur  Abdo: Soft, NT, ND, +BS, +Peg tube   Exts: Contracted exts, some leg swelling   Skin: No wounds, No rashes or lesions    Data Review:       Recent Days:  Recent Labs     22  0856 22  1050 22  0816   WBC 8.1 8.2 8.7   HGB 10.6* 10.5* 11.1*   HCT 33.2* 33.1* 35.1*    363 329     Recent Labs     22  0856 22  1050 22  0816   BUN 8 8 6   CREA 0.62* 0.59* 0.64*       Lab Results   Component Value Date/Time    C-Reactive protein 6.44 (H) 2021 05:00 AM        Microbiology     Results     Procedure Component Value Units Date/Time    CULTURE, BLOOD [[id number]] Collected: 21    Order Status: Completed Specimen: Blood Updated: 22     Special Requests: --        No Special Requests  Left  Hand       Culture result: No growth 5 days       CULTURE, BLOOD [[id number]] Collected: 21    Order Status: Completed Specimen: Blood Updated: 22 1511     Special Requests: No Special Requests        Culture result: No growth 6 days           Diagnostics   CXR Results  (Last 48 hours)    None             Assessment/Plan     1.  SIRS, intermittently febrile, hemodynamically stable       Suspect atelectasis, no cough or increased oral secretions      Will repeat CXR and UA/Micro. Will leave on Augmentin for now       Routine labs in the morning     2. Aspiration pneumonitis, H/o dysphagia w chronic aspiration      Maintaining O2 sats in the low to mid 90s on RA, no cough       On day # 5/7 of Augmentin. 3. Dysphagia/poor oral intake: On tube feeds via peg tube       4. H/o seizure d/o, occasional episodes of break through seizure     5.  Acute left leg DVT, anticoagulated on Eliquis Loa Lombard, MD    1/4/2022

## 2022-01-04 NOTE — PROGRESS NOTES
PHYSICAL THERAPY TREATMENT  Patient: Nikole Mccoy (67 y.o. male)  Date: 1/4/2022  Diagnosis: [facility] [J69.0] <principal problem not specified>  Procedure(s) (LRB):  ESOPHAGOGASTRODUODENOSCOPY (EGD) (N/A)  PERCUTANEOUS ENDOSCOPIC GASTROSTOMY TUBE INSERTION (N/A) 8 Days Post-Op  Precautions:  fall  Chart, physical therapy assessment, plan of care and goals were reviewed. ASSESSMENT  Patient continues with skilled PT services and is progressing towards goals. Patient found in bed, OT just finished the eval, patient just had BM so RN and CNA assisted with cleaning the patient up. Patient received Passisve rom to raina les with gentle prolong stretches to restore and regain some rom in BLE. Maria Antonia Tierneyn Able to gain good stretch on both les . pillow support provided to hold the stretch in bed            PLAN :  Patient continues to benefit from skilled intervention to address the above impairments. Continue treatment per established plan of care. to address goals. Recommendation for discharge: (in order for the patient to meet his/her long term goals)  Group Home    This discharge recommendation:  Has been made in collaboration with the attending provider and/or case management    IF patient discharges home will need the following DME: speciality bed        SUBJECTIVE:   Patient stated patient is nonverbal.    OBJECTIVE DATA SUMMARY:   Critical Behavior:  Neurologic State: Alert,Lethargic  Orientation Level: Unable to verbalize  Cognition: No command following     Functional Mobility Training:  Bed Mobility:  Rolling: Total assistance;Assist x2                            Activity Tolerance:   Good  Please refer to the flowsheet for vital signs taken during this treatment.     After treatment patient left in no apparent distress:   Patient positioned in left  sidelying for pressure relief,bunny boots applied    COMMUNICATION/COLLABORATION:   The patients plan of care was discussed with: Occupational therapist and Registered nurse.      Alexandra Quintana PT   Time Calculation: 40 mins

## 2022-01-05 ENCOUNTER — APPOINTMENT (OUTPATIENT)
Dept: GENERAL RADIOLOGY | Age: 47
DRG: 720 | End: 2022-01-05
Attending: INTERNAL MEDICINE
Payer: MEDICAID

## 2022-01-05 LAB
ALBUMIN SERPL-MCNC: 2.4 G/DL (ref 3.5–5)
ALBUMIN/GLOB SERPL: 0.5 {RATIO} (ref 1.1–2.2)
ALP SERPL-CCNC: 164 U/L (ref 45–117)
ALT SERPL-CCNC: 25 U/L (ref 12–78)
ANION GAP SERPL CALC-SCNC: 8 MMOL/L (ref 5–15)
AST SERPL W P-5'-P-CCNC: 31 U/L (ref 15–37)
BACTERIA SPEC CULT: NORMAL
BASOPHILS # BLD: 0 K/UL (ref 0–0.1)
BASOPHILS NFR BLD: 1 % (ref 0–1)
BILIRUB SERPL-MCNC: 0.2 MG/DL (ref 0.2–1)
BUN SERPL-MCNC: 8 MG/DL (ref 6–20)
BUN/CREAT SERPL: 14 (ref 12–20)
CA-I BLD-MCNC: 8.4 MG/DL (ref 8.5–10.1)
CHLORIDE SERPL-SCNC: 100 MMOL/L (ref 97–108)
CO2 SERPL-SCNC: 25 MMOL/L (ref 21–32)
CREAT SERPL-MCNC: 0.59 MG/DL (ref 0.7–1.3)
DIFFERENTIAL METHOD BLD: ABNORMAL
EOSINOPHIL # BLD: 0.3 K/UL (ref 0–0.4)
EOSINOPHIL NFR BLD: 4 % (ref 0–7)
ERYTHROCYTE [DISTWIDTH] IN BLOOD BY AUTOMATED COUNT: 13.2 % (ref 11.5–14.5)
GLOBULIN SER CALC-MCNC: 4.6 G/DL (ref 2–4)
GLUCOSE SERPL-MCNC: 130 MG/DL (ref 65–100)
HCT VFR BLD AUTO: 32.1 % (ref 36.6–50.3)
HGB BLD-MCNC: 10.3 G/DL (ref 12.1–17)
IMM GRANULOCYTES # BLD AUTO: 0 K/UL (ref 0–0.04)
IMM GRANULOCYTES NFR BLD AUTO: 0 % (ref 0–0.5)
LYMPHOCYTES # BLD: 1.4 K/UL (ref 0.8–3.5)
LYMPHOCYTES NFR BLD: 17 % (ref 12–49)
MCH RBC QN AUTO: 30.2 PG (ref 26–34)
MCHC RBC AUTO-ENTMCNC: 32.1 G/DL (ref 30–36.5)
MCV RBC AUTO: 94.1 FL (ref 80–99)
MONOCYTES # BLD: 0.8 K/UL (ref 0–1)
MONOCYTES NFR BLD: 10 % (ref 5–13)
NEUTS SEG # BLD: 5.7 K/UL (ref 1.8–8)
NEUTS SEG NFR BLD: 68 % (ref 32–75)
NRBC # BLD: 0 K/UL (ref 0–0.01)
NRBC BLD-RTO: 0 PER 100 WBC
PLATELET # BLD AUTO: 367 K/UL (ref 150–400)
PMV BLD AUTO: 9.3 FL (ref 8.9–12.9)
POTASSIUM SERPL-SCNC: 3.9 MMOL/L (ref 3.5–5.1)
PROT SERPL-MCNC: 7 G/DL (ref 6.4–8.2)
RBC # BLD AUTO: 3.41 M/UL (ref 4.1–5.7)
SODIUM SERPL-SCNC: 133 MMOL/L (ref 136–145)
SPECIAL REQUESTS,SREQ: NORMAL
WBC # BLD AUTO: 8.3 K/UL (ref 4.1–11.1)

## 2022-01-05 PROCEDURE — 74011250637 HC RX REV CODE- 250/637: Performed by: HOSPITALIST

## 2022-01-05 PROCEDURE — 74011250637 HC RX REV CODE- 250/637: Performed by: NURSE PRACTITIONER

## 2022-01-05 PROCEDURE — 71045 X-RAY EXAM CHEST 1 VIEW: CPT

## 2022-01-05 PROCEDURE — 36415 COLL VENOUS BLD VENIPUNCTURE: CPT

## 2022-01-05 PROCEDURE — 74011250636 HC RX REV CODE- 250/636: Performed by: STUDENT IN AN ORGANIZED HEALTH CARE EDUCATION/TRAINING PROGRAM

## 2022-01-05 PROCEDURE — 74011250636 HC RX REV CODE- 250/636: Performed by: HOSPITALIST

## 2022-01-05 PROCEDURE — 65270000029 HC RM PRIVATE

## 2022-01-05 PROCEDURE — 74011250637 HC RX REV CODE- 250/637: Performed by: INTERNAL MEDICINE

## 2022-01-05 PROCEDURE — 74011250637 HC RX REV CODE- 250/637: Performed by: PHYSICIAN ASSISTANT

## 2022-01-05 PROCEDURE — 85025 COMPLETE CBC W/AUTO DIFF WBC: CPT

## 2022-01-05 PROCEDURE — 74011250637 HC RX REV CODE- 250/637: Performed by: STUDENT IN AN ORGANIZED HEALTH CARE EDUCATION/TRAINING PROGRAM

## 2022-01-05 PROCEDURE — 80053 COMPREHEN METABOLIC PANEL: CPT

## 2022-01-05 RX ORDER — SODIUM CHLORIDE TAB 1 GM 1 G
1 TAB MISCELLANEOUS
Status: COMPLETED | OUTPATIENT
Start: 2022-01-05 | End: 2022-01-05

## 2022-01-05 RX ORDER — FUROSEMIDE 10 MG/ML
20 INJECTION INTRAMUSCULAR; INTRAVENOUS ONCE
Status: COMPLETED | OUTPATIENT
Start: 2022-01-05 | End: 2022-01-05

## 2022-01-05 RX ADMIN — LAMOTRIGINE 300 MG: 100 TABLET ORAL at 22:05

## 2022-01-05 RX ADMIN — PREDNISOLONE ACETATE 1 DROP: 10 SUSPENSION/ DROPS OPHTHALMIC at 22:09

## 2022-01-05 RX ADMIN — KETOTIFEN FUMARATE 1 DROP: 0.35 SOLUTION/ DROPS OPHTHALMIC at 22:09

## 2022-01-05 RX ADMIN — ERYTHROMYCIN: 5 OINTMENT OPHTHALMIC at 22:00

## 2022-01-05 RX ADMIN — FUROSEMIDE 20 MG: 10 INJECTION, SOLUTION INTRAMUSCULAR; INTRAVENOUS at 11:04

## 2022-01-05 RX ADMIN — LEVETIRACETAM 1000 MG: 10 INJECTION, SOLUTION INTRAVENOUS at 11:05

## 2022-01-05 RX ADMIN — APIXABAN 5 MG: 5 TABLET, FILM COATED ORAL at 22:05

## 2022-01-05 RX ADMIN — MIDODRINE HYDROCHLORIDE 5 MG: 5 TABLET ORAL at 11:05

## 2022-01-05 RX ADMIN — PANTOPRAZOLE SODIUM 40 MG: 40 TABLET, DELAYED RELEASE ORAL at 22:05

## 2022-01-05 RX ADMIN — LACTULOSE 15 ML: 10 SOLUTION ORAL at 11:13

## 2022-01-05 RX ADMIN — SODIUM CHLORIDE 75 ML/HR: 9 INJECTION, SOLUTION INTRAVENOUS at 00:41

## 2022-01-05 RX ADMIN — SODIUM CHLORIDE 75 ML/HR: 9 INJECTION, SOLUTION INTRAVENOUS at 22:09

## 2022-01-05 RX ADMIN — MIDODRINE HYDROCHLORIDE 5 MG: 5 TABLET ORAL at 18:51

## 2022-01-05 RX ADMIN — PANTOPRAZOLE SODIUM 40 MG: 40 TABLET, DELAYED RELEASE ORAL at 11:04

## 2022-01-05 RX ADMIN — Medication 1 G: at 11:04

## 2022-01-05 RX ADMIN — LEVETIRACETAM 1000 MG: 10 INJECTION, SOLUTION INTRAVENOUS at 22:04

## 2022-01-05 RX ADMIN — KETOTIFEN FUMARATE 1 DROP: 0.35 SOLUTION/ DROPS OPHTHALMIC at 11:12

## 2022-01-05 RX ADMIN — FOLIC ACID 1 MG: 1 TABLET ORAL at 11:04

## 2022-01-05 RX ADMIN — PREDNISOLONE ACETATE 1 DROP: 10 SUSPENSION/ DROPS OPHTHALMIC at 11:12

## 2022-01-05 RX ADMIN — APIXABAN 5 MG: 5 TABLET, FILM COATED ORAL at 11:04

## 2022-01-05 RX ADMIN — PHENYTOIN 130 MG: 125 SUSPENSION ORAL at 22:04

## 2022-01-05 RX ADMIN — LAMOTRIGINE 300 MG: 100 TABLET ORAL at 11:04

## 2022-01-05 RX ADMIN — ERYTHROMYCIN: 5 OINTMENT OPHTHALMIC at 05:47

## 2022-01-05 RX ADMIN — PHENYTOIN 130 MG: 125 SUSPENSION ORAL at 11:04

## 2022-01-05 RX ADMIN — LEVETIRACETAM 1000 MG: 10 INJECTION, SOLUTION INTRAVENOUS at 00:34

## 2022-01-05 RX ADMIN — AMOXICILLIN AND CLAVULANATE POTASSIUM 1 TABLET: 875; 125 TABLET, FILM COATED ORAL at 21:00

## 2022-01-05 NOTE — PROGRESS NOTES
CM has reviewed chart. Plan t discharge to [address] [place] [address] Monday 01/10/2022. Setting up home health through [facility] date 1/11/20222. Awaiting to hear from infusion company for tube feed supplies and DME for gel overlay mattress set up.     DC plan is [facility]

## 2022-01-05 NOTE — PROGRESS NOTES
Patient is resting in bed quietly. No evidence of pain discomfort. New peripheral IV started to  Left wrst #22 and NS running @75ml/hr. PEG tube patent and tolerating tube feed well as ordered.  Will continue to monitor

## 2022-01-05 NOTE — PROGRESS NOTES
Problem: Pressure Injury - Risk of  Goal: *Prevention of pressure injury  Description: Document Martin Scale and appropriate interventions in the flowsheet. Outcome: Progressing Towards Goal  Note: Pressure Injury Interventions:  Sensory Interventions: Assess changes in LOC    Moisture Interventions: Absorbent underpads,Apply protective barrier, creams and emollients    Activity Interventions: Assess need for specialty bed,PT/OT evaluation    Mobility Interventions: Assess need for specialty bed    Nutrition Interventions: Document food/fluid/supplement intake    Friction and Shear Interventions: Apply protective barrier, creams and emollients                Problem: Patient Education: Go to Patient Education Activity  Goal: Patient/Family Education  Outcome: Progressing Towards Goal     Problem: Patient Education: Go to Patient Education Activity  Goal: Patient/Family Education  Outcome: Progressing Towards Goal     Problem: Falls - Risk of  Goal: *Absence of Falls  Description: Document Thomas Ralphs Fall Risk and appropriate interventions in the flowsheet.   Outcome: Progressing Towards Goal  Note: Fall Risk Interventions:  Mobility Interventions: Bed/chair exit alarm    Mentation Interventions: Adequate sleep, hydration, pain control    Medication Interventions: Bed/chair exit alarm    Elimination Interventions: Bed/chair exit alarm,Toileting schedule/hourly rounds              Problem: Patient Education: Go to Patient Education Activity  Goal: Patient/Family Education  Outcome: Progressing Towards Goal     Problem: Patient Education: Go to Patient Education Activity  Goal: Patient/Family Education  Outcome: Progressing Towards Goal     Problem: Patient Education: Go to Patient Education Activity  Goal: Patient/Family Education  Outcome: Progressing Towards Goal

## 2022-01-05 NOTE — PROGRESS NOTES
Hospitalist Progress Note    Subjective:   Daily Progress Note: 1/5/2022 10:20 AM    Patient is resting comfortably. Non verbal at baseline. Patient is afebrile today with normal vital signs. UA appears not to have been completed will speak to RN to obtain today.     Current Facility-Administered Medications   Medication Dose Route Frequency    LORazepam (ATIVAN) injection 2 mg  2 mg IntraVENous Q4H PRN    LORazepam (ATIVAN) injection 2 mg  2 mg IntraVENous Q5MIN PRN    phenytoin (DILANTIN) 100 mg/4 mL oral suspension 130 mg  130 mg Oral DAILY    amoxicillin-clavulanate (AUGMENTIN) 875-125 mg per tablet 1 Tablet  1 Tablet Oral Q12H    erythromycin (ILOTYCIN) 5 mg/gram (0.5 %) ophthalmic ointment   Left Eye Q8H    pantoprazole (PROTONIX) tablet 40 mg  40 mg Oral BID    midodrine (PROAMATINE) tablet 5 mg  5 mg Oral TID WITH MEALS    prednisoLONE acetate (PRED FORTE) 1 % ophthalmic suspension 1 Drop  1 Drop Left Eye BID    apixaban (ELIQUIS) tablet 5 mg  5 mg Oral BID    0.9% sodium chloride infusion  75 mL/hr IntraVENous CONTINUOUS    levETIRAcetam in saline (iso-os) (KEPPRA) infusion 1,000 mg  1,000 mg IntraVENous BID    acetaminophen (TYLENOL) suppository 650 mg  650 mg Rectal Q4H PRN    ondansetron (ZOFRAN) injection 4 mg  4 mg IntraVENous Q6H PRN    phenytoin (DILANTIN) 100 mg/4 mL oral suspension 130 mg  130 mg Oral QHS    cetirizine (ZYRTEC) tablet 10 mg  10 mg Oral DAILY    folic acid (FOLVITE) tablet 1 mg  1 mg Oral DAILY    ketotifen (ZADITOR) 0.025 % (0.035 %) ophthalmic solution 1 Drop  1 Drop Both Eyes BID    lactulose (CHRONULAC) 10 gram/15 mL solution 15 mL  10 g Oral DAILY    lamoTRIgine (LaMICtal) tablet 300 mg  300 mg Oral BID    psyllium husk-aspartame (METAMUCIL FIBER) packet 1 Packet  1 Packet Oral DAILY    acetaminophen (TYLENOL) tablet 650 mg  650 mg Oral Q4H PRN    albuterol (PROVENTIL HFA, VENTOLIN HFA, PROAIR HFA) inhaler 2 Puff  2 Puff Inhalation Q4H PRN        Review of Systems  Review of Systems   Unable to perform ROS: Acuity of condition            Objective:     Visit Vitals  /69 (BP 1 Location: Left upper arm, BP Patient Position: At rest)   Pulse 100   Temp 99.1 °F (37.3 °C)   Resp 18   Ht 5' 6\" (1.676 m)   Wt 73 kg (160 lb 15 oz)   SpO2 93%   BMI 25.98 kg/m²    O2 Flow Rate (L/min): 2 l/min O2 Device: None (Room air)    Temp (24hrs), Av.9 °F (37.2 °C), Min:98.7 °F (37.1 °C), Max:99.1 °F (37.3 °C)      No intake/output data recorded.  1901 -  0700  In: 240   Out: 1500 [Urine:1500]    Recent Results (from the past 24 hour(s))   SARS-COV-2    Collection Time: 22  8:54 PM   Result Value Ref Range    SARS-CoV-2 Please find results under separate order     COVID-19 RAPID TEST    Collection Time: 22  8:54 PM   Result Value Ref Range    Specimen source Nasopharyngeal      COVID-19 rapid test Not Detected Not Detected     METABOLIC PANEL, COMPREHENSIVE    Collection Time: 22  6:48 AM   Result Value Ref Range    Sodium 133 (L) 136 - 145 mmol/L    Potassium 3.9 3.5 - 5.1 mmol/L    Chloride 100 97 - 108 mmol/L    CO2 25 21 - 32 mmol/L    Anion gap 8 5 - 15 mmol/L    Glucose 130 (H) 65 - 100 mg/dL    BUN 8 6 - 20 mg/dL    Creatinine 0.59 (L) 0.70 - 1.30 mg/dL    BUN/Creatinine ratio 14 12 - 20      GFR est AA >60 >60 ml/min/1.73m2    GFR est non-AA >60 >60 ml/min/1.73m2    Calcium 8.4 (L) 8.5 - 10.1 mg/dL    Bilirubin, total 0.2 0.2 - 1.0 mg/dL    AST (SGOT) 31 15 - 37 U/L    ALT (SGPT) 25 12 - 78 U/L    Alk.  phosphatase 164 (H) 45 - 117 U/L    Protein, total 7.0 6.4 - 8.2 g/dL    Albumin 2.4 (L) 3.5 - 5.0 g/dL    Globulin 4.6 (H) 2.0 - 4.0 g/dL    A-G Ratio 0.5 (L) 1.1 - 2.2     CBC WITH AUTOMATED DIFF    Collection Time: 22  6:48 AM   Result Value Ref Range    WBC 8.3 4.1 - 11.1 K/uL    RBC 3.41 (L) 4.10 - 5.70 M/uL    HGB 10.3 (L) 12.1 - 17.0 g/dL    HCT 32.1 (L) 36.6 - 50.3 %    MCV 94.1 80.0 - 99.0 FL    MCH 30.2 26.0 - 34.0 PG    MCHC 32.1 30.0 - 36.5 g/dL    RDW 13.2 11.5 - 14.5 %    PLATELET 930 287 - 710 K/uL    MPV 9.3 8.9 - 12.9 FL    NRBC 0.0 0.0  WBC    ABSOLUTE NRBC 0.00 0.00 - 0.01 K/uL    NEUTROPHILS 68 32 - 75 %    LYMPHOCYTES 17 12 - 49 %    MONOCYTES 10 5 - 13 %    EOSINOPHILS 4 0 - 7 %    BASOPHILS 1 0 - 1 %    IMMATURE GRANULOCYTES 0 0 - 0.5 %    ABS. NEUTROPHILS 5.7 1.8 - 8.0 K/UL    ABS. LYMPHOCYTES 1.4 0.8 - 3.5 K/UL    ABS. MONOCYTES 0.8 0.0 - 1.0 K/UL    ABS. EOSINOPHILS 0.3 0.0 - 0.4 K/UL    ABS. BASOPHILS 0.0 0.0 - 0.1 K/UL    ABS. IMM. GRANS. 0.0 0.00 - 0.04 K/UL    DF AUTOMATED          XR CHEST PORT   Final Result      XR CHEST PORT   Final Result      Single portable AP view compared to December 23, 2021. Generator pack on the   left with electrode extending to the left side of the neck. Suboptimal   inspiratory film compromises visualization of the lower lung fields. Heart size normal. No mediastinal widening or shift. Reticular nodular pattern in the lungs persists and may be postinflammatory or   related to prior aspiration. No developing consolidation. Negative for pulmonary   edema, pleural effusion or pneumothorax. No acute fractures. Negative for subdiaphragmatic free air. XR CHEST PORT   Final Result   1. Nasogastric tube tip overlies the right upper quadrant, likely the gastric   antrum or duodenal bulb. 2. Persistent reticulonodular opacities in both lungs. CTA CHEST W OR W WO CONT   Final Result   1. Extensive bilateral reticulonodular infiltrates throughout both lungs similar   to the previous study although both lungs were not included in their entirety on   the previous study. Findings may represent infectious, inflammatory or   neoplastic process. Recommend follow-up to. No evidence of pulmonary embolus,   aortic aneurysm or aortic dissection. 3. Borderline enlarged gastrohepatic lymph node. 4. Thickened esophagus, recommend clinical evaluation.       DUPLEX LOWER EXT VENOUS BILAT   Final Result      XR CHEST PORT   Final Result   No significant interval change. CT ABD PELV W CONT   Final Result      1. Small focus of intraluminal gas within the urinary bladder. Please correlate   for recent instrumentation or other causes of intraluminal gas, including an   infectious process. 2. Somewhat reticular nodular opacities in the lung bases, suboptimally   evaluated due to respiratory motion. Some of these have what appears to be a   tree and bud morphology, suggesting either chronic aspiration or an infectious   or inflammatory small airways disease. Otherwise a fibrotic process should be   considered. This would be better characterized with dedicated chest CT as   clinically warranted. 3. Underdistention versus circumferential wall thickening of the distal   esophagus. 4. Probable left trochanteric bursitis with sterility of the fluid   indeterminate. 5. Probable myositis ossificans surrounding the left hip. Please correlate for   history of prior trauma. 6. Enlarged lymph nodes in the upper abdomen, nonspecific. Comparison with   outside imaging would be helpful, if available, to determine longer term   stability. Otherwise short interval follow-up in 3 months is recommended. XR CHEST SNGL V   Final Result   Patchy opacities within the lungs, nonspecific, but leading differential   considerations include infection or pulmonary edema. Superimposed acute disease   on an underlying chronic interstitial process is also possible. Radiographic   follow-up is recommended to exclude other causes of opacification. XR ABD (KUB)   Final Result   Mildly prominent gas-filled loops of bowel within the central abdomen with   overall nonobstructive bowel gas pattern. PHYSICAL EXAM:    Physical Exam  Vitals and nursing note reviewed. Constitutional:       Comments: More interactive with provider today   HENT:      Head: Normocephalic and atraumatic.    Eyes: General:         Right eye: No discharge. Left eye: No discharge. Conjunctiva/sclera: Conjunctivae normal.   Cardiovascular:      Rate and Rhythm: Normal rate and regular rhythm. Pulmonary:      Effort: No respiratory distress. Breath sounds: No wheezing. Abdominal:      General: Bowel sounds are normal. There is no distension. Tenderness: There is no abdominal tenderness. Comments: PEG tube in place   Genitourinary:     Comments: Weinberg present  Musculoskeletal:         General: No tenderness. Right lower leg: No edema. Left lower leg: No edema. Comments: Upper extremities:  Left: adducted and flexed against chest   Right: abducted and flexed against chest    Lower extremities: bilaterally internally rotated and flexed   Skin:     General: Skin is warm. Capillary Refill: Capillary refill takes less than 2 seconds. Neurological:      Mental Status: He is alert. He is disoriented.       Comments: Nonverbal at baseline   Psychiatric:      Comments: Unable to assess due to mental acuity        Data Review    Recent Results (from the past 24 hour(s))   SARS-COV-2    Collection Time: 01/04/22  8:54 PM   Result Value Ref Range    SARS-CoV-2 Please find results under separate order     COVID-19 RAPID TEST    Collection Time: 01/04/22  8:54 PM   Result Value Ref Range    Specimen source Nasopharyngeal      COVID-19 rapid test Not Detected Not Detected     METABOLIC PANEL, COMPREHENSIVE    Collection Time: 01/05/22  6:48 AM   Result Value Ref Range    Sodium 133 (L) 136 - 145 mmol/L    Potassium 3.9 3.5 - 5.1 mmol/L    Chloride 100 97 - 108 mmol/L    CO2 25 21 - 32 mmol/L    Anion gap 8 5 - 15 mmol/L    Glucose 130 (H) 65 - 100 mg/dL    BUN 8 6 - 20 mg/dL    Creatinine 0.59 (L) 0.70 - 1.30 mg/dL    BUN/Creatinine ratio 14 12 - 20      GFR est AA >60 >60 ml/min/1.73m2    GFR est non-AA >60 >60 ml/min/1.73m2    Calcium 8.4 (L) 8.5 - 10.1 mg/dL    Bilirubin, total 0.2 0.2 - 1.0 mg/dL    AST (SGOT) 31 15 - 37 U/L    ALT (SGPT) 25 12 - 78 U/L    Alk. phosphatase 164 (H) 45 - 117 U/L    Protein, total 7.0 6.4 - 8.2 g/dL    Albumin 2.4 (L) 3.5 - 5.0 g/dL    Globulin 4.6 (H) 2.0 - 4.0 g/dL    A-G Ratio 0.5 (L) 1.1 - 2.2     CBC WITH AUTOMATED DIFF    Collection Time: 01/05/22  6:48 AM   Result Value Ref Range    WBC 8.3 4.1 - 11.1 K/uL    RBC 3.41 (L) 4.10 - 5.70 M/uL    HGB 10.3 (L) 12.1 - 17.0 g/dL    HCT 32.1 (L) 36.6 - 50.3 %    MCV 94.1 80.0 - 99.0 FL    MCH 30.2 26.0 - 34.0 PG    MCHC 32.1 30.0 - 36.5 g/dL    RDW 13.2 11.5 - 14.5 %    PLATELET 332 953 - 424 K/uL    MPV 9.3 8.9 - 12.9 FL    NRBC 0.0 0.0  WBC    ABSOLUTE NRBC 0.00 0.00 - 0.01 K/uL    NEUTROPHILS 68 32 - 75 %    LYMPHOCYTES 17 12 - 49 %    MONOCYTES 10 5 - 13 %    EOSINOPHILS 4 0 - 7 %    BASOPHILS 1 0 - 1 %    IMMATURE GRANULOCYTES 0 0 - 0.5 %    ABS. NEUTROPHILS 5.7 1.8 - 8.0 K/UL    ABS. LYMPHOCYTES 1.4 0.8 - 3.5 K/UL    ABS. MONOCYTES 0.8 0.0 - 1.0 K/UL    ABS. EOSINOPHILS 0.3 0.0 - 0.4 K/UL    ABS. BASOPHILS 0.0 0.0 - 0.1 K/UL    ABS. IMM. GRANS. 0.0 0.00 - 0.04 K/UL    DF AUTOMATED          Assessment/Plan:     Active Problems:    Sepsis ([facility] (12/17/2021)      Aspiration pneumonia ([facility] (12/17/2021)      Respiratory failure with hypoxia ([facility] (12/21/2021)      DVT (deep venous thrombosis) ([place] [id number].) (12/23/2021)        Hospital Course:    Nikkie Gray is a 51-year-old male with a PMH significant for intellectual disabilities, seizure disorder, quadriplegia who lives in a group home who presented with a 4 day history of anorexia, constipation s/p enema, and vomiting. In emergency room he was hypertensive, febrile, tachycardic and altered from baseline. Chest x-ray consistent with aspiration pneumonia. CT abd/pelvis Admitted for further management of sepsis, aspiration pneumonia, respiratory failure with hypoxia, and breakthrough seizure activity.  Blood pressure decreasing required ICU admission and septic shock and started on Levophed. Started on vancomycin and Zosyn for aspiration pneumonia. Required nasal cannula oxygen for acute hypoxic respiratory failure. Weaned off pressor therapy and transferred to medical floor for further management. Doppler study positive for left external iliac vein thrombosis, left common femoral vein thrombosis and left proximal femoral vein thrombosis. He was started on Eliquis. Decreased antibiotic to monotherapy IV Zosyn. Started on Ketotifen and prednisone eyedrop for left sclera edema. Due to high risk of aspiration secondary to chronic disabilities and anatomical swallowing weakness, inability to maintain hydration and nutrition status he would benefit from feeding tube placement. GI has been consulted. PEG tube placed on 12/27/21. Heparin drip for bridge therapy while holding Eliquis. On the night of 12/28/2021 patient began vomiting tube feeds and was tachycardic/tachypneic. Concern for sepsis secondary aspiration pneumonia. Started back on IV vancomycin and Zosyn. Repeat chest x-ray showed no developing consolidation but does show persistent postinflammatory or related to prior aspiration reticular nodule pattern. Patient did not have a white count or elevated inflammatory markers. Patient was initially in the 80's saturating but is now normal. Vancomycin and Zosyn discontinued and started on Augmentin for 14 doses. Decreasing phenytoin level from prior study during hospitalization. Patient had breakthrough seizure Dr. Charmayne Scarce advised to increase Dilantin dosage to 130 mg twice daily. Unable to act with patient's neurologist in Dr. Joshua Willett who can be called at [phone]. Patient to be placed on 1/10 to group home. UA pending. COVID negative 1/4. CXR 1/5 c/w small right sided pleural effusion. Lasix 20mg  given x1. Will repeat CXR tomorrow.     Acute hypoxic respiratory failure secondary to aspiration pneumonia  Septic shock - resolved off pressors  S/p zosyn x10 days, continue on Augmentin #6/14 12/18 blood: negative, final  12/29 blood: NGTD, prelim  ID following    Seizure disorder, breakthrough seizure  Continue with dilantin, keppra, and lamictal   Neurology signed off    Intellectual disability / quadriplegia  History of dysphagia  PEG tube placed on 12/27    Deep venous thrombosis  Venous doppler positive for DVT  Continue on Eliquis    Hypertension  Continue on midodrine 5 mg twice daily    Left sclera edema  Continue on ketotifen and prednisolone drops    DVT Prophylaxis: eliquis  GI Prophylaxis: protonix  Discharge and disposition barriers: UA pending 1/10 group home transfer    Spoke with mother Beatty    Care Plan discussed with: Patient/Family, Nurse and     Total time spent with patient: 35 minutes.

## 2022-01-06 ENCOUNTER — APPOINTMENT (OUTPATIENT)
Dept: GENERAL RADIOLOGY | Age: 47
DRG: 720 | End: 2022-01-06
Attending: STUDENT IN AN ORGANIZED HEALTH CARE EDUCATION/TRAINING PROGRAM
Payer: MEDICAID

## 2022-01-06 LAB
ALBUMIN SERPL-MCNC: 2.5 G/DL (ref 3.5–5)
ALBUMIN/GLOB SERPL: 0.5 {RATIO} (ref 1.1–2.2)
ALP SERPL-CCNC: 168 U/L (ref 45–117)
ALT SERPL-CCNC: 27 U/L (ref 12–78)
ANION GAP SERPL CALC-SCNC: 6 MMOL/L (ref 5–15)
AST SERPL W P-5'-P-CCNC: 29 U/L (ref 15–37)
BASOPHILS # BLD: 0 K/UL (ref 0–0.1)
BASOPHILS NFR BLD: 0 % (ref 0–1)
BILIRUB SERPL-MCNC: 0.2 MG/DL (ref 0.2–1)
BUN SERPL-MCNC: 10 MG/DL (ref 6–20)
BUN/CREAT SERPL: 18 (ref 12–20)
CA-I BLD-MCNC: 8.9 MG/DL (ref 8.5–10.1)
CHLORIDE SERPL-SCNC: 100 MMOL/L (ref 97–108)
CO2 SERPL-SCNC: 26 MMOL/L (ref 21–32)
CREAT SERPL-MCNC: 0.56 MG/DL (ref 0.7–1.3)
DIFFERENTIAL METHOD BLD: ABNORMAL
EOSINOPHIL # BLD: 0.3 K/UL (ref 0–0.4)
EOSINOPHIL NFR BLD: 3 % (ref 0–7)
ERYTHROCYTE [DISTWIDTH] IN BLOOD BY AUTOMATED COUNT: 13.2 % (ref 11.5–14.5)
GLOBULIN SER CALC-MCNC: 5.1 G/DL (ref 2–4)
GLUCOSE SERPL-MCNC: 126 MG/DL (ref 65–100)
HCT VFR BLD AUTO: 34.2 % (ref 36.6–50.3)
HGB BLD-MCNC: 11 G/DL (ref 12.1–17)
IMM GRANULOCYTES # BLD AUTO: 0 K/UL (ref 0–0.04)
IMM GRANULOCYTES NFR BLD AUTO: 0 % (ref 0–0.5)
LYMPHOCYTES # BLD: 1.3 K/UL (ref 0.8–3.5)
LYMPHOCYTES NFR BLD: 13 % (ref 12–49)
MCH RBC QN AUTO: 29.9 PG (ref 26–34)
MCHC RBC AUTO-ENTMCNC: 32.2 G/DL (ref 30–36.5)
MCV RBC AUTO: 92.9 FL (ref 80–99)
MONOCYTES # BLD: 0.9 K/UL (ref 0–1)
MONOCYTES NFR BLD: 9 % (ref 5–13)
NEUTS SEG # BLD: 7.2 K/UL (ref 1.8–8)
NEUTS SEG NFR BLD: 75 % (ref 32–75)
NRBC # BLD: 0 K/UL (ref 0–0.01)
NRBC BLD-RTO: 0 PER 100 WBC
PLATELET # BLD AUTO: 374 K/UL (ref 150–400)
PMV BLD AUTO: 9 FL (ref 8.9–12.9)
POTASSIUM SERPL-SCNC: 4.1 MMOL/L (ref 3.5–5.1)
PROT SERPL-MCNC: 7.6 G/DL (ref 6.4–8.2)
RBC # BLD AUTO: 3.68 M/UL (ref 4.1–5.7)
SODIUM SERPL-SCNC: 132 MMOL/L (ref 136–145)
WBC # BLD AUTO: 9.7 K/UL (ref 4.1–11.1)

## 2022-01-06 PROCEDURE — 74011250637 HC RX REV CODE- 250/637: Performed by: HOSPITALIST

## 2022-01-06 PROCEDURE — 80053 COMPREHEN METABOLIC PANEL: CPT

## 2022-01-06 PROCEDURE — 74011250637 HC RX REV CODE- 250/637: Performed by: PHYSICIAN ASSISTANT

## 2022-01-06 PROCEDURE — 65270000029 HC RM PRIVATE

## 2022-01-06 PROCEDURE — 74011250637 HC RX REV CODE- 250/637: Performed by: STUDENT IN AN ORGANIZED HEALTH CARE EDUCATION/TRAINING PROGRAM

## 2022-01-06 PROCEDURE — 99232 SBSQ HOSP IP/OBS MODERATE 35: CPT | Performed by: INTERNAL MEDICINE

## 2022-01-06 PROCEDURE — 74011250636 HC RX REV CODE- 250/636: Performed by: HOSPITALIST

## 2022-01-06 PROCEDURE — 85025 COMPLETE CBC W/AUTO DIFF WBC: CPT

## 2022-01-06 PROCEDURE — 74011250637 HC RX REV CODE- 250/637: Performed by: INTERNAL MEDICINE

## 2022-01-06 PROCEDURE — 71045 X-RAY EXAM CHEST 1 VIEW: CPT

## 2022-01-06 PROCEDURE — 74011250637 HC RX REV CODE- 250/637: Performed by: NURSE PRACTITIONER

## 2022-01-06 PROCEDURE — 36415 COLL VENOUS BLD VENIPUNCTURE: CPT

## 2022-01-06 PROCEDURE — 97535 SELF CARE MNGMENT TRAINING: CPT

## 2022-01-06 RX ADMIN — APIXABAN 5 MG: 5 TABLET, FILM COATED ORAL at 10:44

## 2022-01-06 RX ADMIN — APIXABAN 5 MG: 5 TABLET, FILM COATED ORAL at 23:13

## 2022-01-06 RX ADMIN — PANTOPRAZOLE SODIUM 40 MG: 40 TABLET, DELAYED RELEASE ORAL at 10:44

## 2022-01-06 RX ADMIN — KETOTIFEN FUMARATE 1 DROP: 0.35 SOLUTION/ DROPS OPHTHALMIC at 23:14

## 2022-01-06 RX ADMIN — PANTOPRAZOLE SODIUM 40 MG: 40 TABLET, DELAYED RELEASE ORAL at 23:14

## 2022-01-06 RX ADMIN — PREDNISOLONE ACETATE 1 DROP: 10 SUSPENSION/ DROPS OPHTHALMIC at 10:47

## 2022-01-06 RX ADMIN — MIDODRINE HYDROCHLORIDE 5 MG: 5 TABLET ORAL at 15:22

## 2022-01-06 RX ADMIN — CETIRIZINE HYDROCHLORIDE 10 MG: 10 TABLET, FILM COATED ORAL at 09:00

## 2022-01-06 RX ADMIN — AMOXICILLIN AND CLAVULANATE POTASSIUM 1 TABLET: 875; 125 TABLET, FILM COATED ORAL at 23:14

## 2022-01-06 RX ADMIN — LAMOTRIGINE 300 MG: 100 TABLET ORAL at 23:13

## 2022-01-06 RX ADMIN — AMOXICILLIN AND CLAVULANATE POTASSIUM 1 TABLET: 875; 125 TABLET, FILM COATED ORAL at 09:00

## 2022-01-06 RX ADMIN — MIDODRINE HYDROCHLORIDE 5 MG: 5 TABLET ORAL at 23:14

## 2022-01-06 RX ADMIN — LEVETIRACETAM 1000 MG: 10 INJECTION, SOLUTION INTRAVENOUS at 10:46

## 2022-01-06 RX ADMIN — PHENYTOIN 130 MG: 125 SUSPENSION ORAL at 10:42

## 2022-01-06 RX ADMIN — KETOTIFEN FUMARATE 1 DROP: 0.35 SOLUTION/ DROPS OPHTHALMIC at 10:47

## 2022-01-06 RX ADMIN — MIDODRINE HYDROCHLORIDE 5 MG: 5 TABLET ORAL at 10:44

## 2022-01-06 RX ADMIN — PSYLLIUM HUSK 1 PACKET: 3.4 POWDER ORAL at 10:42

## 2022-01-06 RX ADMIN — LEVETIRACETAM 1000 MG: 10 INJECTION, SOLUTION INTRAVENOUS at 23:14

## 2022-01-06 RX ADMIN — LACTULOSE 15 ML: 10 SOLUTION ORAL at 10:42

## 2022-01-06 RX ADMIN — ERYTHROMYCIN: 5 OINTMENT OPHTHALMIC at 10:47

## 2022-01-06 RX ADMIN — LAMOTRIGINE 300 MG: 100 TABLET ORAL at 10:44

## 2022-01-06 RX ADMIN — ACETAMINOPHEN 650 MG: 325 TABLET ORAL at 23:13

## 2022-01-06 RX ADMIN — FOLIC ACID 1 MG: 1 TABLET ORAL at 10:43

## 2022-01-06 RX ADMIN — PREDNISOLONE ACETATE 1 DROP: 10 SUSPENSION/ DROPS OPHTHALMIC at 23:14

## 2022-01-06 RX ADMIN — PHENYTOIN 130 MG: 125 SUSPENSION ORAL at 23:13

## 2022-01-06 NOTE — PROGRESS NOTES
Infectious Disease Progress Note           Subjective:   Remains stable, no acute events since last seen, alert, not following commands   Objective:   Physical Exam:     Visit Vitals  BP (!) 96/58 (BP Patient Position: At rest)   Pulse (!) 107   Temp 97.9 °F (36.6 °C)   Resp 20   Ht 5' 6\" (1.676 m)   Wt 160 lb 15 oz (73 kg)   SpO2 92%   BMI 25.98 kg/m²    O2 Flow Rate (L/min): 2 l/min O2 Device: None (Room air)    Temp (24hrs), Av.6 °F (37 °C), Min:97.9 °F (36.6 °C), Max:99 °F (37.2 °C)    No intake/output data recorded.  1901 -  0700  In: 240   Out: 1500 [Urine:1500]    General: NAD, alert, non-verbal   HEENT: ALEJO, Moist mucosa, decreased left sclera erythema  Lungs: CTA b/l, decreased at the bases, no wheeze/rhonchi   Heart: S1S2+, RRR, no murmur  Abdo: Soft, NT, ND, +BS, +Peg tube   Exts: Contracted exts, some leg swelling   Skin: No wounds, No rashes or lesions    Data Review:       Recent Days:  Recent Labs     22  0648 22  0856   WBC 8.3 8.1   HGB 10.3* 10.6*   HCT 32.1* 33.2*    365     Recent Labs     22  0648 22  0856   BUN 8 8   CREA 0.59* 0.62*       Lab Results   Component Value Date/Time    C-Reactive protein 6.44 (H) 2021 05:00 AM        Microbiology     Results     Procedure Component Value Units Date/Time    COVID-19 RAPID TEST [[id number]] Collected: 22    Order Status: Completed Specimen: Nasopharyngeal Updated: 22     Specimen source Nasopharyngeal        COVID-19 rapid test Not Detected        Comment: Rapid Abbott ID Now   Rapid NAAT:  The specimen is NEGATIVE for SARS-CoV-2, the novel coronavirus associated with COVID-19. Negative results should be treated as presumptive and, if inconsistent with clinical signs and symptoms or necessary for patient management, should be tested with an alternative molecular assay.  Negative results do not preclude SARS-CoV-2 infection and should not be used as the sole basis for patient management decisions. This test has been authorized by the FDA under   an Emergency Use Authorization (EUA) for use by authorized laboratories. Fact sheet for Healthcare Providers: ConventionUpdate.co.nz Fact sheet for Patients: ConventionUpdate.co.nz   Methodology: Isothermal Nucleic Acid Amplification         CULTURE, BLOOD [[id number]] Collected: 12/29/21 0721    Order Status: Completed Specimen: Blood Updated: 01/05/22 1056     Special Requests: --        No Special Requests  Left  Hand       Culture result: No growth 6 days       CULTURE, BLOOD [[id number]] Collected: 12/29/21 0720    Order Status: Completed Specimen: Blood Updated: 01/04/22 1511     Special Requests: No Special Requests        Culture result: No growth 6 days           Diagnostics   CXR Results  (Last 48 hours)               01/06/22 0915  XR CHEST PORT Final result    Narrative:  1 view comparison yesterday       Micronodular disease, left greater than right unchanged. No new lung finding. No   effusion. Normal heart and mediastinum       01/05/22 0914  XR CHEST PORT Final result    Narrative:  Chest single view. Comparison single view chest 12/29/2021. Hazy reticular markings most dense left lung unchanged compared to prior   imaging. Left-sided stimulatory device. Cardiac and mediastinal structures   unchanged. No pneumothorax. Small dependent right pleural effusion. Assessment/Plan     1. SIRS, intermittently febrile. Admitted w aspiration PNA, no new source of acute infection       UA was unremarkable on 12/17 and 12/29, multiple blood Cxs have been neg       Reason for intermittent fevers unclear since PNA has been appropriately treated, though w ongoing risk factors for aspiration       Continue frequent turns. D/c A ugmentin after todays dose and monitor off antibiotics       Routine labs in the morning     2.  Aspiration pneumonitis, H/o dysphagia w chronic aspiration      Currently maintain O2 sats on RA, no cough or increased oral secretions       Unchanged Micronodular disease, left greater than right on CXR         3. Dysphagia/poor oral intake: On tube feeds via peg tube       4. H/o seizure d/o, occasional episodes of break through seizure     5.  Acute left leg DVT, anticoagulated on Eliquis      Leah Mcrae MD    1/6/2022

## 2022-01-06 NOTE — PROGRESS NOTES
Problem: Pressure Injury - Risk of  Goal: *Prevention of pressure injury  Description: Document Martin Scale and appropriate interventions in the flowsheet. Outcome: Progressing Towards Goal  Note: Pressure Injury Interventions:  Sensory Interventions: Assess changes in LOC,Assess need for specialty bed    Moisture Interventions: Absorbent underpads,Apply protective barrier, creams and emollients    Activity Interventions: Assess need for specialty bed,PT/OT evaluation    Mobility Interventions: Assess need for specialty bed    Nutrition Interventions: Document food/fluid/supplement intake,Discuss nutritional consult with provider,Offer support with meals,snacks and hydration    Friction and Shear Interventions: Apply protective barrier, creams and emollients,Lift sheet,Lift team/patient mobility team,Minimize layers                Problem: Patient Education: Go to Patient Education Activity  Goal: Patient/Family Education  Outcome: Progressing Towards Goal     Problem: Patient Education: Go to Patient Education Activity  Goal: Patient/Family Education  Outcome: Progressing Towards Goal     Problem: Falls - Risk of  Goal: *Absence of Falls  Description: Document Marya Munozlain Fall Risk and appropriate interventions in the flowsheet.   Outcome: Progressing Towards Goal  Note: Fall Risk Interventions:  Mobility Interventions: Bed/chair exit alarm    Mentation Interventions: Adequate sleep, hydration, pain control    Medication Interventions: Bed/chair exit alarm    Elimination Interventions: Bed/chair exit alarm,Toileting schedule/hourly rounds              Problem: Patient Education: Go to Patient Education Activity  Goal: Patient/Family Education  Outcome: Progressing Towards Goal     Problem: Patient Education: Go to Patient Education Activity  Goal: Patient/Family Education  Outcome: Progressing Towards Goal     Problem: Patient Education: Go to Patient Education Activity  Goal: Patient/Family Education  Outcome: Progressing Towards Goal

## 2022-01-06 NOTE — PROGRESS NOTES
Hospitalist Progress Note    Subjective:   Daily Progress Note: 1/6/2022 10:20 AM    Patient is resting comfortably. Non verbal at baseline. Patient is afebrile today with normal vital signs. UA appears not to have been completed spoke to lead RN who said this will be done.     Current Facility-Administered Medications   Medication Dose Route Frequency    LORazepam (ATIVAN) injection 2 mg  2 mg IntraVENous Q4H PRN    LORazepam (ATIVAN) injection 2 mg  2 mg IntraVENous Q5MIN PRN    phenytoin (DILANTIN) 100 mg/4 mL oral suspension 130 mg  130 mg Oral DAILY    amoxicillin-clavulanate (AUGMENTIN) 875-125 mg per tablet 1 Tablet  1 Tablet Oral Q12H    pantoprazole (PROTONIX) tablet 40 mg  40 mg Oral BID    midodrine (PROAMATINE) tablet 5 mg  5 mg Oral TID WITH MEALS    prednisoLONE acetate (PRED FORTE) 1 % ophthalmic suspension 1 Drop  1 Drop Left Eye BID    apixaban (ELIQUIS) tablet 5 mg  5 mg Oral BID    0.9% sodium chloride infusion  75 mL/hr IntraVENous CONTINUOUS    levETIRAcetam in saline (iso-os) (KEPPRA) infusion 1,000 mg  1,000 mg IntraVENous BID    acetaminophen (TYLENOL) suppository 650 mg  650 mg Rectal Q4H PRN    ondansetron (ZOFRAN) injection 4 mg  4 mg IntraVENous Q6H PRN    phenytoin (DILANTIN) 100 mg/4 mL oral suspension 130 mg  130 mg Oral QHS    cetirizine (ZYRTEC) tablet 10 mg  10 mg Oral DAILY    folic acid (FOLVITE) tablet 1 mg  1 mg Oral DAILY    ketotifen (ZADITOR) 0.025 % (0.035 %) ophthalmic solution 1 Drop  1 Drop Both Eyes BID    lactulose (CHRONULAC) 10 gram/15 mL solution 15 mL  10 g Oral DAILY    lamoTRIgine (LaMICtal) tablet 300 mg  300 mg Oral BID    psyllium husk-aspartame (METAMUCIL FIBER) packet 1 Packet  1 Packet Oral DAILY    acetaminophen (TYLENOL) tablet 650 mg  650 mg Oral Q4H PRN    albuterol (PROVENTIL HFA, VENTOLIN HFA, PROAIR HFA) inhaler 2 Puff  2 Puff Inhalation Q4H PRN        Review of Systems  Review of Systems   Unable to perform ROS: Acuity of condition            Objective:     Visit Vitals  /61 (BP Patient Position: At rest)   Pulse (!) 103   Temp 97.8 °F (36.6 °C)   Resp 18   Ht 5' 6\" (1.676 m)   Wt 73 kg (160 lb 15 oz)   SpO2 92%   BMI 25.98 kg/m²    O2 Flow Rate (L/min): 2 l/min O2 Device: None (Room air)    Temp (24hrs), Av.4 °F (36.9 °C), Min:97.8 °F (36.6 °C), Max:99 °F (37.2 °C)      No intake/output data recorded.  1901 -  0700  In: 240   Out: 1500 [Urine:1500]    Recent Results (from the past 24 hour(s))   METABOLIC PANEL, COMPREHENSIVE    Collection Time: 22 12:20 PM   Result Value Ref Range    Sodium 132 (L) 136 - 145 mmol/L    Potassium 4.1 3.5 - 5.1 mmol/L    Chloride 100 97 - 108 mmol/L    CO2 26 21 - 32 mmol/L    Anion gap 6 5 - 15 mmol/L    Glucose 126 (H) 65 - 100 mg/dL    BUN 10 6 - 20 mg/dL    Creatinine 0.56 (L) 0.70 - 1.30 mg/dL    BUN/Creatinine ratio 18 12 - 20      GFR est AA >60 >60 ml/min/1.73m2    GFR est non-AA >60 >60 ml/min/1.73m2    Calcium 8.9 8.5 - 10.1 mg/dL    Bilirubin, total 0.2 0.2 - 1.0 mg/dL    AST (SGOT) 29 15 - 37 U/L    ALT (SGPT) 27 12 - 78 U/L    Alk. phosphatase 168 (H) 45 - 117 U/L    Protein, total 7.6 6.4 - 8.2 g/dL    Albumin 2.5 (L) 3.5 - 5.0 g/dL    Globulin 5.1 (H) 2.0 - 4.0 g/dL    A-G Ratio 0.5 (L) 1.1 - 2.2     CBC WITH AUTOMATED DIFF    Collection Time: 22 12:20 PM   Result Value Ref Range    WBC 9.7 4.1 - 11.1 K/uL    RBC 3.68 (L) 4.10 - 5.70 M/uL    HGB 11.0 (L) 12.1 - 17.0 g/dL    HCT 34.2 (L) 36.6 - 50.3 %    MCV 92.9 80.0 - 99.0 FL    MCH 29.9 26.0 - 34.0 PG    MCHC 32.2 30.0 - 36.5 g/dL    RDW 13.2 11.5 - 14.5 %    PLATELET 353 772 - 715 K/uL    MPV 9.0 8.9 - 12.9 FL    NRBC 0.0 0.0  WBC    ABSOLUTE NRBC 0.00 0.00 - 0.01 K/uL    NEUTROPHILS 75 32 - 75 %    LYMPHOCYTES 13 12 - 49 %    MONOCYTES 9 5 - 13 %    EOSINOPHILS 3 0 - 7 %    BASOPHILS 0 0 - 1 %    IMMATURE GRANULOCYTES 0 0 - 0.5 %    ABS. NEUTROPHILS 7.2 1.8 - 8.0 K/UL    ABS.  LYMPHOCYTES 1.3 0.8 - 3.5 K/UL    ABS. MONOCYTES 0.9 0.0 - 1.0 K/UL    ABS. EOSINOPHILS 0.3 0.0 - 0.4 K/UL    ABS. BASOPHILS 0.0 0.0 - 0.1 K/UL    ABS. IMM. GRANS. 0.0 0.00 - 0.04 K/UL    DF AUTOMATED          XR CHEST PORT   Final Result      XR CHEST PORT   Final Result      XR CHEST PORT   Final Result      Single portable AP view compared to December 23, 2021. Generator pack on the   left with electrode extending to the left side of the neck. Suboptimal   inspiratory film compromises visualization of the lower lung fields. Heart size normal. No mediastinal widening or shift. Reticular nodular pattern in the lungs persists and may be postinflammatory or   related to prior aspiration. No developing consolidation. Negative for pulmonary   edema, pleural effusion or pneumothorax. No acute fractures. Negative for subdiaphragmatic free air. XR CHEST PORT   Final Result   1. Nasogastric tube tip overlies the right upper quadrant, likely the gastric   antrum or duodenal bulb. 2. Persistent reticulonodular opacities in both lungs. CTA CHEST W OR W WO CONT   Final Result   1. Extensive bilateral reticulonodular infiltrates throughout both lungs similar   to the previous study although both lungs were not included in their entirety on   the previous study. Findings may represent infectious, inflammatory or   neoplastic process. Recommend follow-up to. No evidence of pulmonary embolus,   aortic aneurysm or aortic dissection. 3. Borderline enlarged gastrohepatic lymph node. 4. Thickened esophagus, recommend clinical evaluation. DUPLEX LOWER EXT VENOUS BILAT   Final Result      XR CHEST PORT   Final Result   No significant interval change. CT ABD PELV W CONT   Final Result      1. Small focus of intraluminal gas within the urinary bladder. Please correlate   for recent instrumentation or other causes of intraluminal gas, including an   infectious process.    2. Somewhat reticular nodular opacities in the lung bases, suboptimally   evaluated due to respiratory motion. Some of these have what appears to be a   tree and bud morphology, suggesting either chronic aspiration or an infectious   or inflammatory small airways disease. Otherwise a fibrotic process should be   considered. This would be better characterized with dedicated chest CT as   clinically warranted. 3. Underdistention versus circumferential wall thickening of the distal   esophagus. 4. Probable left trochanteric bursitis with sterility of the fluid   indeterminate. 5. Probable myositis ossificans surrounding the left hip. Please correlate for   history of prior trauma. 6. Enlarged lymph nodes in the upper abdomen, nonspecific. Comparison with   outside imaging would be helpful, if available, to determine longer term   stability. Otherwise short interval follow-up in 3 months is recommended. XR CHEST SNGL V   Final Result   Patchy opacities within the lungs, nonspecific, but leading differential   considerations include infection or pulmonary edema. Superimposed acute disease   on an underlying chronic interstitial process is also possible. Radiographic   follow-up is recommended to exclude other causes of opacification. XR ABD (KUB)   Final Result   Mildly prominent gas-filled loops of bowel within the central abdomen with   overall nonobstructive bowel gas pattern. PHYSICAL EXAM:    Physical Exam  Vitals and nursing note reviewed. Constitutional:       Comments: More interactive with provider today   HENT:      Head: Normocephalic and atraumatic. Eyes:      General:         Right eye: No discharge. Left eye: No discharge. Conjunctiva/sclera: Conjunctivae normal.   Cardiovascular:      Rate and Rhythm: Normal rate and regular rhythm. Pulmonary:      Effort: No respiratory distress. Breath sounds: No wheezing. Abdominal:      General: Bowel sounds are normal. There is no distension. Tenderness:  There is no abdominal tenderness. Comments: PEG tube in place   Genitourinary:     Comments: Weinberg present  Musculoskeletal:         General: No tenderness. Right lower leg: No edema. Left lower leg: No edema. Comments: Upper extremities:  Left: adducted and flexed against chest   Right: abducted and flexed against chest    Lower extremities: bilaterally internally rotated and flexed   Skin:     General: Skin is warm. Capillary Refill: Capillary refill takes less than 2 seconds. Neurological:      Mental Status: He is alert. He is disoriented. Comments: Nonverbal at baseline   Psychiatric:      Comments: Unable to assess due to mental acuity        Data Review    Recent Results (from the past 24 hour(s))   METABOLIC PANEL, COMPREHENSIVE    Collection Time: 01/06/22 12:20 PM   Result Value Ref Range    Sodium 132 (L) 136 - 145 mmol/L    Potassium 4.1 3.5 - 5.1 mmol/L    Chloride 100 97 - 108 mmol/L    CO2 26 21 - 32 mmol/L    Anion gap 6 5 - 15 mmol/L    Glucose 126 (H) 65 - 100 mg/dL    BUN 10 6 - 20 mg/dL    Creatinine 0.56 (L) 0.70 - 1.30 mg/dL    BUN/Creatinine ratio 18 12 - 20      GFR est AA >60 >60 ml/min/1.73m2    GFR est non-AA >60 >60 ml/min/1.73m2    Calcium 8.9 8.5 - 10.1 mg/dL    Bilirubin, total 0.2 0.2 - 1.0 mg/dL    AST (SGOT) 29 15 - 37 U/L    ALT (SGPT) 27 12 - 78 U/L    Alk.  phosphatase 168 (H) 45 - 117 U/L    Protein, total 7.6 6.4 - 8.2 g/dL    Albumin 2.5 (L) 3.5 - 5.0 g/dL    Globulin 5.1 (H) 2.0 - 4.0 g/dL    A-G Ratio 0.5 (L) 1.1 - 2.2     CBC WITH AUTOMATED DIFF    Collection Time: 01/06/22 12:20 PM   Result Value Ref Range    WBC 9.7 4.1 - 11.1 K/uL    RBC 3.68 (L) 4.10 - 5.70 M/uL    HGB 11.0 (L) 12.1 - 17.0 g/dL    HCT 34.2 (L) 36.6 - 50.3 %    MCV 92.9 80.0 - 99.0 FL    MCH 29.9 26.0 - 34.0 PG    MCHC 32.2 30.0 - 36.5 g/dL    RDW 13.2 11.5 - 14.5 %    PLATELET 946 114 - 398 K/uL    MPV 9.0 8.9 - 12.9 FL    NRBC 0.0 0.0  WBC    ABSOLUTE NRBC 0.00 0.00 - 0.01 K/uL    NEUTROPHILS 75 32 - 75 %    LYMPHOCYTES 13 12 - 49 %    MONOCYTES 9 5 - 13 %    EOSINOPHILS 3 0 - 7 %    BASOPHILS 0 0 - 1 %    IMMATURE GRANULOCYTES 0 0 - 0.5 %    ABS. NEUTROPHILS 7.2 1.8 - 8.0 K/UL    ABS. LYMPHOCYTES 1.3 0.8 - 3.5 K/UL    ABS. MONOCYTES 0.9 0.0 - 1.0 K/UL    ABS. EOSINOPHILS 0.3 0.0 - 0.4 K/UL    ABS. BASOPHILS 0.0 0.0 - 0.1 K/UL    ABS. IMM. GRANS. 0.0 0.00 - 0.04 K/UL    DF AUTOMATED          Assessment/Plan:     Active Problems:    Sepsis ([facility] [address] (12/17/2021)      Aspiration pneumonia ([address] (12/17/2021)      Respiratory failure with hypoxia ([facility] [address] (12/21/2021)      DVT (deep venous thrombosis) ([facility] [address] (12/23/2021)        Hospital Course:    Bhumika Lucio is a 55-year-old male with a PMH significant for intellectual disabilities, seizure disorder, quadriplegia who lives in a group home who presented with a 4 day history of anorexia, constipation s/p enema, and vomiting. In emergency room he was hypertensive, febrile, tachycardic and altered from baseline. Chest x-ray consistent with aspiration pneumonia. CT abd/pelvis Admitted for further management of sepsis, aspiration pneumonia, respiratory failure with hypoxia, and breakthrough seizure activity. Blood pressure decreasing required ICU admission and septic shock and started on Levophed. Started on vancomycin and Zosyn for aspiration pneumonia. Required nasal cannula oxygen for acute hypoxic respiratory failure. Weaned off pressor therapy and transferred to medical floor for further management. Doppler study positive for left external iliac vein thrombosis, left common femoral vein thrombosis and left proximal femoral vein thrombosis. He was started on Eliquis. Decreased antibiotic to monotherapy IV Zosyn. Started on Ketotifen and prednisone eyedrop for left sclera edema.  Due to high risk of aspiration secondary to chronic disabilities and anatomical swallowing weakness, inability to maintain hydration and nutrition status he would benefit from feeding tube placement. GI has been consulted. PEG tube placed on 12/27/21. Heparin drip for bridge therapy while holding Eliquis. On the night of 12/28/2021 patient began vomiting tube feeds and was tachycardic / tachypneic. Concern for sepsis secondary aspiration pneumonia. Started back on IV vancomycin and Zosyn. Repeat chest x-ray showed no developing consolidation but does show persistent postinflammatory or related to prior aspiration reticular nodule pattern. Patient did not have a white count or elevated inflammatory markers. Patient was initially in the 80's saturating but is now normal. Vancomycin and Zosyn discontinued and started on Augmentin for 14 doses. Decreasing phenytoin level from prior study during hospitalization. Patient had breakthrough seizure Dr. Delia Steven advised to increase Dilantin dosage to 130 mg twice daily. Unable to act with patient's neurologist in Dr. Herminia Whiting who can be called at [phone]. Patient to be placed on 1/10 to group home. UA pending. COVID negative 1/4. CXR 1/5 c/w small right sided pleural effusion. Lasix 20mg given x1. 1/6 CXR with unchanged micronodular disease left greater than right. Will discontinue Augmentin after today's dose and monitor off antibiotics.     Acute hypoxic respiratory failure secondary to aspiration pneumonia  S/p zosyn x10 days, continue on Augmentin for today and monitor off antibiotics  On RA with normal saturation, no cough, ongoing risk factors for aspiration PNA  12/18 blood: negative, final  12/29 blood: NGTD, prelim  UA pending  Aspiration precautions  ID following    Seizure disorder, breakthrough seizure  Continue with dilantin, keppra, and lamictal   Neurology signed off    Intellectual disability / quadriplegia  History of dysphagia  PEG tube placed on 12/27    Deep venous thrombosis  Venous doppler positive for DVT  Continue on Eliquis    Hypertension  Continue on midodrine 5 mg twice daily    Left sclera edema  Continue on ketotifen and prednisolone drops    DVT Prophylaxis: eliquis  GI Prophylaxis: protonix  Discharge and disposition barriers: UA pending 1/10 group home transfer    Spoke with Raissa Tabares mother    Care Plan discussed with: Patient/Family, Nurse and     Total time spent with patient: 35 minutes.

## 2022-01-06 NOTE — PROGRESS NOTES
Problem: Self Care Deficits Care Plan (Adult)  Goal: *Acute Goals and Plan of Care (Insert Text)  Description: Pt will be max A rolling to each side in prep for bed level toileting   Pt will be max A grooming at bed level  Pt will be max A LE dressing sitting EOB/long sit  Pt will be max A toileting/toilet transfer/cloth mgmt LRAD  Pt will be mod A following UE HEP in prep for self care tasks   Outcome: Progressing Towards Goal   OCCUPATIONAL THERAPY TREATMENT  Patient: Monse Rivera (69 y.o. male)  Date: 1/6/2022  Diagnosis: Aspiration pneumonia ([facility] [J69.0] <principal problem not specified>  Procedure(s) (LRB):  ESOPHAGOGASTRODUODENOSCOPY (EGD) (N/A)  PERCUTANEOUS ENDOSCOPIC GASTROSTOMY TUBE INSERTION (N/A) 10 Days Post-Op  Precautions:    Chart, occupational therapy assessment, plan of care, and goals were reviewed. ASSESSMENT  Patient continues with skilled OT services and is progressing slowly towards goals . Patient received semi supine in bed upon MCCORMACK'S arrival, NPO (peg tube). Pt open eyes with tactile stimuli using warm wash cloth to clean deep sleepers in pt's eyes and drool running down his chin (TA for grooming). Pt would move head side to side as therapist wiped eyes. Therapist perform SROM for raina UE for increased ROM and decrease joint stiffness, see grid below. Pt contracted throughout raina UE. Pt inappropriate to continue further as pt kept eyes closed. Pt left resting in bed. Nursing notified of therapy session. Patient would benefit from continued skilled Occupational services while at [facility] in order to increase safety and independence with self care and functional tranfers/mobility. Recommend at discharge to group home with [facility] when medically appropriate.      Current Level of Function Impacting Discharge (ADLs): TA for grooming    Other factors to consider for discharge: Time of onset, medical prognosis/diagnosis, severity of deficits, PLOF, home environment, and family support          PLAN :  Patient continues to benefit from skilled intervention to address the above impairments. Continue treatment per established plan of care. to address goals. Recommend with staff: Bed mobility to relieve pressure sores     Recommend next OT session: PROM,simple grooming    Recommendation for discharge: (in order for the patient to meet his/her long term goals)  Group home with 24/7 care and HHOT    This discharge recommendation:  Has been made in collaboration with the attending provider and/or case management    IF patient discharges home will need the following DME: TBD       SUBJECTIVE:   Patient stated non- verbal    OBJECTIVE DATA SUMMARY:   Cognitive/Behavioral Status:  Neurologic State: Eyes open to stimulus (tactile)  Orientation Level: Unable to verbalize                Functional Mobility and Transfers for ADLs:  Bed Mobility:       Transfers:     Balance:       ADL Intervention:       Grooming  Grooming Assistance: Total assistance(dependent)  Position Performed: Long sitting on bed (semi supine)  Washing Face: Total assistance (dependent)       Therapeutic Exercises:   Exercise Sets Reps AROM AAROM PROM Self PROM Comments   Shoulder flex/ext 1 5 [] [] [x] [] Pt very contracted in raina UE   Elbow flex/ext 1 5 [] [] [x] []    Wrist flex/ext 1 5 [] [] [x] []         Pain:  Unable to assess    Activity Tolerance:   Poor  Please refer to the flowsheet for vital signs taken during this treatment. After treatment patient left in no apparent distress:   Supine in bed, Call bell within reach, and Side rails x 3    COMMUNICATION/COLLABORATION:   The patients plan of care was discussed with: Registered nurse.      KSENIA Lo  Time Calculation: 8 mins

## 2022-01-06 NOTE — PROGRESS NOTES
Patient repositioned approximately every 2 hours however he always self turns facing toward the right even with the use of pillows and wedges

## 2022-01-07 LAB
ALBUMIN SERPL-MCNC: 2.6 G/DL (ref 3.5–5)
ALBUMIN/GLOB SERPL: 0.5 {RATIO} (ref 1.1–2.2)
ALP SERPL-CCNC: 169 U/L (ref 45–117)
ALT SERPL-CCNC: 32 U/L (ref 12–78)
ANION GAP SERPL CALC-SCNC: 7 MMOL/L (ref 5–15)
APPEARANCE UR: ABNORMAL
AST SERPL W P-5'-P-CCNC: 36 U/L (ref 15–37)
BACTERIA URNS QL MICRO: NEGATIVE /HPF
BASOPHILS # BLD: 0.1 K/UL (ref 0–0.1)
BASOPHILS NFR BLD: 1 % (ref 0–1)
BILIRUB SERPL-MCNC: 0.2 MG/DL (ref 0.2–1)
BILIRUB UR QL: NEGATIVE
BUN SERPL-MCNC: 11 MG/DL (ref 6–20)
BUN/CREAT SERPL: 18 (ref 12–20)
CA-I BLD-MCNC: 9.2 MG/DL (ref 8.5–10.1)
CHLORIDE SERPL-SCNC: 100 MMOL/L (ref 97–108)
CO2 SERPL-SCNC: 25 MMOL/L (ref 21–32)
COLOR UR: ABNORMAL
CREAT SERPL-MCNC: 0.6 MG/DL (ref 0.7–1.3)
DIFFERENTIAL METHOD BLD: ABNORMAL
EOSINOPHIL # BLD: 0.5 K/UL (ref 0–0.4)
EOSINOPHIL NFR BLD: 4 % (ref 0–7)
ERYTHROCYTE [DISTWIDTH] IN BLOOD BY AUTOMATED COUNT: 13.2 % (ref 11.5–14.5)
GLOBULIN SER CALC-MCNC: 4.8 G/DL (ref 2–4)
GLUCOSE SERPL-MCNC: 122 MG/DL (ref 65–100)
GLUCOSE UR STRIP.AUTO-MCNC: NEGATIVE MG/DL
HCT VFR BLD AUTO: 33.3 % (ref 36.6–50.3)
HGB BLD-MCNC: 10.5 G/DL (ref 12.1–17)
HGB UR QL STRIP: NEGATIVE
IMM GRANULOCYTES # BLD AUTO: 0 K/UL (ref 0–0.04)
IMM GRANULOCYTES NFR BLD AUTO: 0 % (ref 0–0.5)
KETONES UR QL STRIP.AUTO: NEGATIVE MG/DL
LEUKOCYTE ESTERASE UR QL STRIP.AUTO: NEGATIVE
LYMPHOCYTES # BLD: 1 K/UL (ref 0.8–3.5)
LYMPHOCYTES NFR BLD: 9 % (ref 12–49)
MCH RBC QN AUTO: 29.6 PG (ref 26–34)
MCHC RBC AUTO-ENTMCNC: 31.5 G/DL (ref 30–36.5)
MCV RBC AUTO: 93.8 FL (ref 80–99)
MONOCYTES # BLD: 1 K/UL (ref 0–1)
MONOCYTES NFR BLD: 9 % (ref 5–13)
MUCOUS THREADS URNS QL MICRO: ABNORMAL /LPF
NEUTS SEG # BLD: 8.4 K/UL (ref 1.8–8)
NEUTS SEG NFR BLD: 77 % (ref 32–75)
NITRITE UR QL STRIP.AUTO: NEGATIVE
NRBC # BLD: 0 K/UL (ref 0–0.01)
NRBC BLD-RTO: 0 PER 100 WBC
PH UR STRIP: 6 [PH] (ref 5–8)
PLATELET # BLD AUTO: 391 K/UL (ref 150–400)
PMV BLD AUTO: 9.5 FL (ref 8.9–12.9)
POTASSIUM SERPL-SCNC: 3.7 MMOL/L (ref 3.5–5.1)
PROT SERPL-MCNC: 7.4 G/DL (ref 6.4–8.2)
PROT UR STRIP-MCNC: 30 MG/DL
RBC # BLD AUTO: 3.55 M/UL (ref 4.1–5.7)
RBC #/AREA URNS HPF: ABNORMAL /HPF (ref 0–5)
SODIUM SERPL-SCNC: 132 MMOL/L (ref 136–145)
SP GR UR REFRACTOMETRY: 1.02 (ref 1–1.03)
UROBILINOGEN UR QL STRIP.AUTO: 0.1 EU/DL (ref 0.1–1)
WBC # BLD AUTO: 10.8 K/UL (ref 4.1–11.1)
WBC URNS QL MICRO: ABNORMAL /HPF (ref 0–4)

## 2022-01-07 PROCEDURE — 74011250637 HC RX REV CODE- 250/637: Performed by: PSYCHIATRY & NEUROLOGY

## 2022-01-07 PROCEDURE — 85025 COMPLETE CBC W/AUTO DIFF WBC: CPT

## 2022-01-07 PROCEDURE — 74011250637 HC RX REV CODE- 250/637: Performed by: INTERNAL MEDICINE

## 2022-01-07 PROCEDURE — 74011250637 HC RX REV CODE- 250/637: Performed by: HOSPITALIST

## 2022-01-07 PROCEDURE — 65270000029 HC RM PRIVATE

## 2022-01-07 PROCEDURE — 80053 COMPREHEN METABOLIC PANEL: CPT

## 2022-01-07 PROCEDURE — 74011250637 HC RX REV CODE- 250/637: Performed by: STUDENT IN AN ORGANIZED HEALTH CARE EDUCATION/TRAINING PROGRAM

## 2022-01-07 PROCEDURE — 81001 URINALYSIS AUTO W/SCOPE: CPT

## 2022-01-07 PROCEDURE — 99232 SBSQ HOSP IP/OBS MODERATE 35: CPT | Performed by: INTERNAL MEDICINE

## 2022-01-07 PROCEDURE — 36415 COLL VENOUS BLD VENIPUNCTURE: CPT

## 2022-01-07 PROCEDURE — 74011250637 HC RX REV CODE- 250/637: Performed by: NURSE PRACTITIONER

## 2022-01-07 PROCEDURE — 74011250636 HC RX REV CODE- 250/636: Performed by: HOSPITALIST

## 2022-01-07 RX ORDER — PANTOPRAZOLE SODIUM 40 MG/1
40 GRANULE, DELAYED RELEASE ORAL 2 TIMES DAILY
Status: DISCONTINUED | OUTPATIENT
Start: 2022-01-07 | End: 2022-01-10 | Stop reason: HOSPADM

## 2022-01-07 RX ORDER — LEVETIRACETAM 500 MG/1
1000 TABLET ORAL 2 TIMES DAILY
Status: DISCONTINUED | OUTPATIENT
Start: 2022-01-07 | End: 2022-01-08

## 2022-01-07 RX ADMIN — MIDODRINE HYDROCHLORIDE 5 MG: 5 TABLET ORAL at 13:24

## 2022-01-07 RX ADMIN — LAMOTRIGINE 300 MG: 100 TABLET ORAL at 21:49

## 2022-01-07 RX ADMIN — APIXABAN 5 MG: 5 TABLET, FILM COATED ORAL at 09:49

## 2022-01-07 RX ADMIN — LEVETIRACETAM 1000 MG: 10 INJECTION, SOLUTION INTRAVENOUS at 09:56

## 2022-01-07 RX ADMIN — MIDODRINE HYDROCHLORIDE 5 MG: 5 TABLET ORAL at 09:48

## 2022-01-07 RX ADMIN — PANTOPRAZOLE SODIUM 40 MG: 40 GRANULE, DELAYED RELEASE ORAL at 09:48

## 2022-01-07 RX ADMIN — PHENYTOIN 130 MG: 125 SUSPENSION ORAL at 21:49

## 2022-01-07 RX ADMIN — APIXABAN 5 MG: 5 TABLET, FILM COATED ORAL at 21:49

## 2022-01-07 RX ADMIN — PANTOPRAZOLE SODIUM 40 MG: 40 GRANULE, DELAYED RELEASE ORAL at 21:49

## 2022-01-07 RX ADMIN — PREDNISOLONE ACETATE 1 DROP: 10 SUSPENSION/ DROPS OPHTHALMIC at 09:59

## 2022-01-07 RX ADMIN — KETOTIFEN FUMARATE 1 DROP: 0.35 SOLUTION/ DROPS OPHTHALMIC at 09:59

## 2022-01-07 RX ADMIN — LAMOTRIGINE 300 MG: 100 TABLET ORAL at 09:48

## 2022-01-07 RX ADMIN — FOLIC ACID 1 MG: 1 TABLET ORAL at 09:49

## 2022-01-07 RX ADMIN — AMOXICILLIN AND CLAVULANATE POTASSIUM 1 TABLET: 875; 125 TABLET, FILM COATED ORAL at 09:48

## 2022-01-07 RX ADMIN — LEVETIRACETAM 1000 MG: 500 TABLET, FILM COATED ORAL at 21:49

## 2022-01-07 RX ADMIN — PREDNISOLONE ACETATE 1 DROP: 10 SUSPENSION/ DROPS OPHTHALMIC at 21:49

## 2022-01-07 RX ADMIN — CETIRIZINE HYDROCHLORIDE 10 MG: 10 TABLET, FILM COATED ORAL at 09:00

## 2022-01-07 RX ADMIN — PSYLLIUM HUSK 1 PACKET: 3.4 POWDER ORAL at 09:49

## 2022-01-07 RX ADMIN — PHENYTOIN 130 MG: 125 SUSPENSION ORAL at 09:47

## 2022-01-07 RX ADMIN — LACTULOSE 15 ML: 10 SOLUTION ORAL at 09:47

## 2022-01-07 RX ADMIN — MIDODRINE HYDROCHLORIDE 5 MG: 5 TABLET ORAL at 16:59

## 2022-01-07 RX ADMIN — KETOTIFEN FUMARATE 1 DROP: 0.35 SOLUTION/ DROPS OPHTHALMIC at 21:49

## 2022-01-07 NOTE — PROGRESS NOTES
Accepted for [place] via [address] w/anticipated [facility] 1/11/2022. Accepted for tube feed supplies via [address] [facility] [place].           Larry Muse, DANGELO

## 2022-01-07 NOTE — PROGRESS NOTES
Infectious Disease Progress Note           Subjective:   Stable, no acute events since last seen. Afebrile, mild rise in WBC on todays labs. Tolerating tube feeds   Objective:   Physical Exam:     Visit Vitals  /65 (BP 1 Location: Left upper arm, BP Patient Position: At rest)   Pulse 97   Temp 99.1 °F (37.3 °C)   Resp 18   Ht 5' 6\" (1.676 m)   Wt 160 lb 15 oz (73 kg)   SpO2 92%   BMI 25.98 kg/m²    O2 Flow Rate (L/min): 2 l/min O2 Device: None (Room air)    Temp (24hrs), Av.5 °F (36.9 °C), Min:97.6 °F (36.4 °C), Max:99.1 °F (37.3 °C)    701 - 1900  In: 125   Out: 800 [Urine:800]   1901 -  07  In: 1240   Out: 600 [Urine:600]    General: NAD, alert, non-verbal   HEENT: ALEJO, Moist mucosa, decreased left sclera erythema  Lungs: CTA b/l, decreased at the bases, no wheeze/rhonchi   Heart: S1S2+, RRR, no murmur  Abdo: Soft, NT, ND, +BS, +Peg tube   Exts: Contracted exts, some leg swelling   Skin: No wounds, No rashes or lesions    Data Review:       Recent Days:  Recent Labs     22  0717 22  1220 22  0648   WBC 10.8 9.7 8.3   HGB 10.5* 11.0* 10.3*   HCT 33.3* 34.2* 32.1*    374 367     Recent Labs     22  0717 22  1220 22  0648   BUN 11 10 8   CREA 0.60* 0.56* 0.59*       Lab Results   Component Value Date/Time    C-Reactive protein 6.44 (H) 2021 05:00 AM        Microbiology     Results     Procedure Component Value Units Date/Time    COVID-19 RAPID TEST [[id number]] Collected: 22    Order Status: Completed Specimen: Nasopharyngeal Updated: 22     Specimen source Nasopharyngeal        COVID-19 rapid test Not Detected        Comment: Rapid Abbott ID Now   Rapid NAAT:  The specimen is NEGATIVE for SARS-CoV-2, the novel coronavirus associated with COVID-19.    Negative results should be treated as presumptive and, if inconsistent with clinical signs and symptoms or necessary for patient management, should be tested with an alternative molecular assay. Negative results do not preclude SARS-CoV-2 infection and should not be used as the sole basis for patient management decisions. This test has been authorized by the FDA under   an Emergency Use Authorization (EUA) for use by authorized laboratories. Fact sheet for Healthcare Providers: [employer] Fact sheet for Patients: [employer]   Methodology: Isothermal Nucleic Acid Amplification         CULTURE, BLOOD [[id number]] Collected: 12/29/21 0721    Order Status: Completed Specimen: Blood Updated: 01/05/22 1056     Special Requests: --        No Special Requests  Left  Hand       Culture result: No growth 6 days       CULTURE, BLOOD [[id number]] Collected: 12/29/21 0720    Order Status: Completed Specimen: Blood Updated: 01/04/22 1511     Special Requests: No Special Requests        Culture result: No growth 6 days           Diagnostics   CXR Results  (Last 48 hours)               01/06/22 0915  XR CHEST PORT Final result    Narrative:  1 view comparison yesterday       Micronodular disease, left greater than right unchanged. No new lung finding. No   effusion. Normal heart and mediastinum                Assessment/Plan     1. SIRS, no documented fevers, hemodynamically stable      UA was unremarkable on 12/17 and 12/29, multiple blood Cxs have been neg       Mild rise in WBC, but no new source of acute infection       Completed 3 wks of empiric antibiotics since admit, Zosyn and Augmentin       Will monitor off antibiotics for now, repeat sepsis work up if febrile       2. Aspiration pneumonitis, H/o dysphagia w chronic aspiration      Currently maintain O2 sats on RA, no cough or increased oral secretions       Unchanged Micronodular disease, left greater than right on CXR         3. Dysphagia/poor oral intake: On tube feeds via peg tube       4. H/o seizure d/o, controlled on anti-epileptics     5. Acute left leg DVT, anticoagulated on Eliquis      Scheduled for d/c on 01/10/22    Venkat Forte MD    1/7/2022

## 2022-01-07 NOTE — PROGRESS NOTES
Hospitalist Progress Note    Subjective:   Daily Progress Note: 1/7/2022 10:20 AM    Patient is resting comfortably. Non verbal at baseline. Patient is afebrile today with normal vital signs. UA + protein and 20-50 RBC.     Current Facility-Administered Medications   Medication Dose Route Frequency    pantoprazole (PROTONIX) granules for oral suspension 40 mg  40 mg Per G Tube BID    LORazepam (ATIVAN) injection 2 mg  2 mg IntraVENous Q4H PRN    LORazepam (ATIVAN) injection 2 mg  2 mg IntraVENous Q5MIN PRN    phenytoin (DILANTIN) 100 mg/4 mL oral suspension 130 mg  130 mg Oral DAILY    amoxicillin-clavulanate (AUGMENTIN) 875-125 mg per tablet 1 Tablet  1 Tablet Oral Q12H    midodrine (PROAMATINE) tablet 5 mg  5 mg Oral TID WITH MEALS    prednisoLONE acetate (PRED FORTE) 1 % ophthalmic suspension 1 Drop  1 Drop Left Eye BID    apixaban (ELIQUIS) tablet 5 mg  5 mg Oral BID    levETIRAcetam in saline (iso-os) (KEPPRA) infusion 1,000 mg  1,000 mg IntraVENous BID    acetaminophen (TYLENOL) suppository 650 mg  650 mg Rectal Q4H PRN    ondansetron (ZOFRAN) injection 4 mg  4 mg IntraVENous Q6H PRN    phenytoin (DILANTIN) 100 mg/4 mL oral suspension 130 mg  130 mg Oral QHS    cetirizine (ZYRTEC) tablet 10 mg  10 mg Oral DAILY    folic acid (FOLVITE) tablet 1 mg  1 mg Oral DAILY    ketotifen (ZADITOR) 0.025 % (0.035 %) ophthalmic solution 1 Drop  1 Drop Both Eyes BID    lactulose (CHRONULAC) 10 gram/15 mL solution 15 mL  10 g Oral DAILY    lamoTRIgine (LaMICtal) tablet 300 mg  300 mg Oral BID    psyllium husk-aspartame (METAMUCIL FIBER) packet 1 Packet  1 Packet Oral DAILY    acetaminophen (TYLENOL) tablet 650 mg  650 mg Oral Q4H PRN    albuterol (PROVENTIL HFA, VENTOLIN HFA, PROAIR HFA) inhaler 2 Puff  2 Puff Inhalation Q4H PRN        Review of Systems  Review of Systems   Unable to perform ROS: Acuity of condition            Objective:     Visit Vitals  /67   Pulse 89   Temp 97.6 °F (36.4 °C) Resp 18   Ht 5' 6\" (1.676 m)   Wt 73 kg (160 lb 15 oz)   SpO2 92%   BMI 25.98 kg/m²    O2 Flow Rate (L/min): 2 l/min O2 Device: None (Room air)    Temp (24hrs), Av.1 °F (36.7 °C), Min:97.6 °F (36.4 °C), Max:99 °F (37.2 °C)      No intake/output data recorded.  1901 -  0700  In: 1240   Out: 600 [Urine:600]    Recent Results (from the past 24 hour(s))   METABOLIC PANEL, COMPREHENSIVE    Collection Time: 22 12:20 PM   Result Value Ref Range    Sodium 132 (L) 136 - 145 mmol/L    Potassium 4.1 3.5 - 5.1 mmol/L    Chloride 100 97 - 108 mmol/L    CO2 26 21 - 32 mmol/L    Anion gap 6 5 - 15 mmol/L    Glucose 126 (H) 65 - 100 mg/dL    BUN 10 6 - 20 mg/dL    Creatinine 0.56 (L) 0.70 - 1.30 mg/dL    BUN/Creatinine ratio 18 12 - 20      GFR est AA >60 >60 ml/min/1.73m2    GFR est non-AA >60 >60 ml/min/1.73m2    Calcium 8.9 8.5 - 10.1 mg/dL    Bilirubin, total 0.2 0.2 - 1.0 mg/dL    AST (SGOT) 29 15 - 37 U/L    ALT (SGPT) 27 12 - 78 U/L    Alk. phosphatase 168 (H) 45 - 117 U/L    Protein, total 7.6 6.4 - 8.2 g/dL    Albumin 2.5 (L) 3.5 - 5.0 g/dL    Globulin 5.1 (H) 2.0 - 4.0 g/dL    A-G Ratio 0.5 (L) 1.1 - 2.2     CBC WITH AUTOMATED DIFF    Collection Time: 22 12:20 PM   Result Value Ref Range    WBC 9.7 4.1 - 11.1 K/uL    RBC 3.68 (L) 4.10 - 5.70 M/uL    HGB 11.0 (L) 12.1 - 17.0 g/dL    HCT 34.2 (L) 36.6 - 50.3 %    MCV 92.9 80.0 - 99.0 FL    MCH 29.9 26.0 - 34.0 PG    MCHC 32.2 30.0 - 36.5 g/dL    RDW 13.2 11.5 - 14.5 %    PLATELET 965 908 - 358 K/uL    MPV 9.0 8.9 - 12.9 FL    NRBC 0.0 0.0  WBC    ABSOLUTE NRBC 0.00 0.00 - 0.01 K/uL    NEUTROPHILS 75 32 - 75 %    LYMPHOCYTES 13 12 - 49 %    MONOCYTES 9 5 - 13 %    EOSINOPHILS 3 0 - 7 %    BASOPHILS 0 0 - 1 %    IMMATURE GRANULOCYTES 0 0 - 0.5 %    ABS. NEUTROPHILS 7.2 1.8 - 8.0 K/UL    ABS. LYMPHOCYTES 1.3 0.8 - 3.5 K/UL    ABS. MONOCYTES 0.9 0.0 - 1.0 K/UL    ABS. EOSINOPHILS 0.3 0.0 - 0.4 K/UL    ABS. BASOPHILS 0.0 0.0 - 0.1 K/UL    ABS. IMM. [place]. 0.0 0.00 - 0.04 K/UL    DF AUTOMATED     URINALYSIS W/MICROSCOPIC    Collection Time: 01/07/22  6:00 AM   Result Value Ref Range    Color Yellow/Straw      Appearance Turbid (A) Clear      Specific gravity 1.024 1.003 - 1.030      pH (UA) 6.0 5.0 - 8.0      Protein 30 (A) Negative mg/dL    Glucose Negative Negative mg/dL    Ketone Negative Negative mg/dL    Bilirubin Negative Negative      Blood Negative Negative      Urobilinogen 0.1 0.1 - 1.0 EU/dL    Nitrites Negative Negative      Leukocyte Esterase Negative Negative      WBC 0-4 0 - 4 /hpf    RBC 20-50 0 - 5 /hpf    Bacteria Negative Negative /hpf    Mucus Trace /lpf   METABOLIC PANEL, COMPREHENSIVE    Collection Time: 01/07/22  7:17 AM   Result Value Ref Range    Sodium 132 (L) 136 - 145 mmol/L    Potassium 3.7 3.5 - 5.1 mmol/L    Chloride 100 97 - 108 mmol/L    CO2 25 21 - 32 mmol/L    Anion gap 7 5 - 15 mmol/L    Glucose 122 (H) 65 - 100 mg/dL    BUN 11 6 - 20 mg/dL    Creatinine 0.60 (L) 0.70 - 1.30 mg/dL    BUN/Creatinine ratio 18 12 - 20      GFR est AA >60 >60 ml/min/1.73m2    GFR est non-AA >60 >60 ml/min/1.73m2    Calcium 9.2 8.5 - 10.1 mg/dL    Bilirubin, total 0.2 0.2 - 1.0 mg/dL    AST (SGOT) 36 15 - 37 U/L    ALT (SGPT) 32 12 - 78 U/L    Alk.  phosphatase 169 (H) 45 - 117 U/L    Protein, total 7.4 6.4 - 8.2 g/dL    Albumin 2.6 (L) 3.5 - 5.0 g/dL    Globulin 4.8 (H) 2.0 - 4.0 g/dL    A-G Ratio 0.5 (L) 1.1 - 2.2     CBC WITH AUTOMATED DIFF    Collection Time: 01/07/22  7:17 AM   Result Value Ref Range    WBC 10.8 4.1 - 11.1 K/uL    RBC 3.55 (L) 4.10 - 5.70 M/uL    HGB 10.5 (L) 12.1 - 17.0 g/dL    HCT 33.3 (L) 36.6 - 50.3 %    MCV 93.8 80.0 - 99.0 FL    MCH 29.6 26.0 - 34.0 PG    MCHC 31.5 30.0 - 36.5 g/dL    RDW 13.2 11.5 - 14.5 %    PLATELET 542 879 - 568 K/uL    MPV 9.5 8.9 - 12.9 FL    NRBC 0.0 0.0  WBC    ABSOLUTE NRBC 0.00 0.00 - 0.01 K/uL    NEUTROPHILS 77 (H) 32 - 75 %    LYMPHOCYTES 9 (L) 12 - 49 %    MONOCYTES 9 5 - 13 % EOSINOPHILS 4 0 - 7 %    BASOPHILS 1 0 - 1 %    IMMATURE GRANULOCYTES 0 0 - 0.5 %    ABS. NEUTROPHILS 8.4 (H) 1.8 - 8.0 K/UL    ABS. LYMPHOCYTES 1.0 0.8 - 3.5 K/UL    ABS. MONOCYTES 1.0 0.0 - 1.0 K/UL    ABS. EOSINOPHILS 0.5 (H) 0.0 - 0.4 K/UL    ABS. BASOPHILS 0.1 0.0 - 0.1 K/UL    ABS. IMM. GRANS. 0.0 0.00 - 0.04 K/UL    DF AUTOMATED          XR CHEST PORT   Final Result      XR CHEST PORT   Final Result      XR CHEST PORT   Final Result      Single portable AP view compared to December 23, 2021. Generator pack on the   left with electrode extending to the left side of the neck. Suboptimal   inspiratory film compromises visualization of the lower lung fields. Heart size normal. No mediastinal widening or shift. Reticular nodular pattern in the lungs persists and may be postinflammatory or   related to prior aspiration. No developing consolidation. Negative for pulmonary   edema, pleural effusion or pneumothorax. No acute fractures. Negative for subdiaphragmatic free air. XR CHEST PORT   Final Result   1. Nasogastric tube tip overlies the right upper quadrant, likely the gastric   antrum or duodenal bulb. 2. Persistent reticulonodular opacities in both lungs. CTA CHEST W OR W WO CONT   Final Result   1. Extensive bilateral reticulonodular infiltrates throughout both lungs similar   to the previous study although both lungs were not included in their entirety on   the previous study. Findings may represent infectious, inflammatory or   neoplastic process. Recommend follow-up to. No evidence of pulmonary embolus,   aortic aneurysm or aortic dissection. 3. Borderline enlarged gastrohepatic lymph node. 4. Thickened esophagus, recommend clinical evaluation. DUPLEX LOWER EXT VENOUS BILAT   Final Result      XR CHEST PORT   Final Result   No significant interval change. CT ABD PELV W CONT   Final Result      1. Small focus of intraluminal gas within the urinary bladder.  Please correlate for recent instrumentation or other causes of intraluminal gas, including an   infectious process. 2. Somewhat reticular nodular opacities in the lung bases, suboptimally   evaluated due to respiratory motion. Some of these have what appears to be a   tree and bud morphology, suggesting either chronic aspiration or an infectious   or inflammatory small airways disease. Otherwise a fibrotic process should be   considered. This would be better characterized with dedicated chest CT as   clinically warranted. 3. Underdistention versus circumferential wall thickening of the distal   esophagus. 4. Probable left trochanteric bursitis with sterility of the fluid   indeterminate. 5. Probable myositis ossificans surrounding the left hip. Please correlate for   history of prior trauma. 6. Enlarged lymph nodes in the upper abdomen, nonspecific. Comparison with   outside imaging would be helpful, if available, to determine longer term   stability. Otherwise short interval follow-up in 3 months is recommended. XR CHEST SNGL V   Final Result   Patchy opacities within the lungs, nonspecific, but leading differential   considerations include infection or pulmonary edema. Superimposed acute disease   on an underlying chronic interstitial process is also possible. Radiographic   follow-up is recommended to exclude other causes of opacification. XR ABD (KUB)   Final Result   Mildly prominent gas-filled loops of bowel within the central abdomen with   overall nonobstructive bowel gas pattern. PHYSICAL EXAM:    Physical Exam  Vitals and nursing note reviewed. Constitutional:       Comments: More interactive with provider today   HENT:      Head: Normocephalic and atraumatic. Eyes:      General:         Right eye: No discharge. Left eye: No discharge. Conjunctiva/sclera: Conjunctivae normal.   Cardiovascular:      Rate and Rhythm: Normal rate and regular rhythm.    Pulmonary:      Effort: No respiratory distress. Breath sounds: No wheezing. Abdominal:      General: Bowel sounds are normal. There is no distension. Tenderness: There is no abdominal tenderness. Comments: PEG tube in place   Genitourinary:     Comments: Weinberg present  Musculoskeletal:         General: No tenderness. Right lower leg: No edema. Left lower leg: No edema. Comments: Upper extremities:  Left: adducted and flexed against chest   Right: abducted and flexed against chest    Lower extremities: bilaterally internally rotated and flexed   Skin:     General: Skin is warm. Capillary Refill: Capillary refill takes less than 2 seconds. Neurological:      Mental Status: He is alert. He is disoriented. Comments: Nonverbal at baseline   Psychiatric:      Comments: Unable to assess due to mental acuity        Data Review    Recent Results (from the past 24 hour(s))   METABOLIC PANEL, COMPREHENSIVE    Collection Time: 01/06/22 12:20 PM   Result Value Ref Range    Sodium 132 (L) 136 - 145 mmol/L    Potassium 4.1 3.5 - 5.1 mmol/L    Chloride 100 97 - 108 mmol/L    CO2 26 21 - 32 mmol/L    Anion gap 6 5 - 15 mmol/L    Glucose 126 (H) 65 - 100 mg/dL    BUN 10 6 - 20 mg/dL    Creatinine 0.56 (L) 0.70 - 1.30 mg/dL    BUN/Creatinine ratio 18 12 - 20      GFR est AA >60 >60 ml/min/1.73m2    GFR est non-AA >60 >60 ml/min/1.73m2    Calcium 8.9 8.5 - 10.1 mg/dL    Bilirubin, total 0.2 0.2 - 1.0 mg/dL    AST (SGOT) 29 15 - 37 U/L    ALT (SGPT) 27 12 - 78 U/L    Alk.  phosphatase 168 (H) 45 - 117 U/L    Protein, total 7.6 6.4 - 8.2 g/dL    Albumin 2.5 (L) 3.5 - 5.0 g/dL    Globulin 5.1 (H) 2.0 - 4.0 g/dL    A-G Ratio 0.5 (L) 1.1 - 2.2     CBC WITH AUTOMATED DIFF    Collection Time: 01/06/22 12:20 PM   Result Value Ref Range    WBC 9.7 4.1 - 11.1 K/uL    RBC 3.68 (L) 4.10 - 5.70 M/uL    HGB 11.0 (L) 12.1 - 17.0 g/dL    HCT 34.2 (L) 36.6 - 50.3 %    MCV 92.9 80.0 - 99.0 FL    MCH 29.9 26.0 - 34.0 PG    MCHC 32.2 30.0 - 36.5 g/dL    RDW 13.2 11.5 - 14.5 %    PLATELET 465 793 - 595 K/uL    MPV 9.0 8.9 - 12.9 FL    NRBC 0.0 0.0  WBC    ABSOLUTE NRBC 0.00 0.00 - 0.01 K/uL    NEUTROPHILS 75 32 - 75 %    LYMPHOCYTES 13 12 - 49 %    MONOCYTES 9 5 - 13 %    EOSINOPHILS 3 0 - 7 %    BASOPHILS 0 0 - 1 %    IMMATURE GRANULOCYTES 0 0 - 0.5 %    ABS. NEUTROPHILS 7.2 1.8 - 8.0 K/UL    ABS. LYMPHOCYTES 1.3 0.8 - 3.5 K/UL    ABS. MONOCYTES 0.9 0.0 - 1.0 K/UL    ABS. EOSINOPHILS 0.3 0.0 - 0.4 K/UL    ABS. BASOPHILS 0.0 0.0 - 0.1 K/UL    ABS. IMM. GRANS. 0.0 0.00 - 0.04 K/UL    DF AUTOMATED     URINALYSIS W/MICROSCOPIC    Collection Time: 01/07/22  6:00 AM   Result Value Ref Range    Color Yellow/Straw      Appearance Turbid (A) Clear      Specific gravity 1.024 1.003 - 1.030      pH (UA) 6.0 5.0 - 8.0      Protein 30 (A) Negative mg/dL    Glucose Negative Negative mg/dL    Ketone Negative Negative mg/dL    Bilirubin Negative Negative      Blood Negative Negative      Urobilinogen 0.1 0.1 - 1.0 EU/dL    Nitrites Negative Negative      Leukocyte Esterase Negative Negative      WBC 0-4 0 - 4 /hpf    RBC 20-50 0 - 5 /hpf    Bacteria Negative Negative /hpf    Mucus Trace /lpf   METABOLIC PANEL, COMPREHENSIVE    Collection Time: 01/07/22  7:17 AM   Result Value Ref Range    Sodium 132 (L) 136 - 145 mmol/L    Potassium 3.7 3.5 - 5.1 mmol/L    Chloride 100 97 - 108 mmol/L    CO2 25 21 - 32 mmol/L    Anion gap 7 5 - 15 mmol/L    Glucose 122 (H) 65 - 100 mg/dL    BUN 11 6 - 20 mg/dL    Creatinine 0.60 (L) 0.70 - 1.30 mg/dL    BUN/Creatinine ratio 18 12 - 20      GFR est AA >60 >60 ml/min/1.73m2    GFR est non-AA >60 >60 ml/min/1.73m2    Calcium 9.2 8.5 - 10.1 mg/dL    Bilirubin, total 0.2 0.2 - 1.0 mg/dL    AST (SGOT) 36 15 - 37 U/L    ALT (SGPT) 32 12 - 78 U/L    Alk.  phosphatase 169 (H) 45 - 117 U/L    Protein, total 7.4 6.4 - 8.2 g/dL    Albumin 2.6 (L) 3.5 - 5.0 g/dL    Globulin 4.8 (H) 2.0 - 4.0 g/dL    A-G Ratio 0.5 (L) 1.1 - 2.2     CBC WITH AUTOMATED DIFF    Collection Time: 01/07/22  7:17 AM   Result Value Ref Range    WBC 10.8 4.1 - 11.1 K/uL    RBC 3.55 (L) 4.10 - 5.70 M/uL    HGB 10.5 (L) 12.1 - 17.0 g/dL    HCT 33.3 (L) 36.6 - 50.3 %    MCV 93.8 80.0 - 99.0 FL    MCH 29.6 26.0 - 34.0 PG    MCHC 31.5 30.0 - 36.5 g/dL    RDW 13.2 11.5 - 14.5 %    PLATELET 188 089 - 051 K/uL    MPV 9.5 8.9 - 12.9 FL    NRBC 0.0 0.0  WBC    ABSOLUTE NRBC 0.00 0.00 - 0.01 K/uL    NEUTROPHILS 77 (H) 32 - 75 %    LYMPHOCYTES 9 (L) 12 - 49 %    MONOCYTES 9 5 - 13 %    EOSINOPHILS 4 0 - 7 %    BASOPHILS 1 0 - 1 %    IMMATURE GRANULOCYTES 0 0 - 0.5 %    ABS. NEUTROPHILS 8.4 (H) 1.8 - 8.0 K/UL    ABS. LYMPHOCYTES 1.0 0.8 - 3.5 K/UL    ABS. MONOCYTES 1.0 0.0 - 1.0 K/UL    ABS. EOSINOPHILS 0.5 (H) 0.0 - 0.4 K/UL    ABS. BASOPHILS 0.1 0.0 - 0.1 K/UL    ABS. IMM. GRANS. 0.0 0.00 - 0.04 K/UL    DF AUTOMATED          Assessment/Plan:     Active Problems:    Sepsis ([address] (12/17/2021)      Aspiration pneumonia ([facility] [address] (12/17/2021)      Respiratory failure with hypoxia ([address] (12/21/2021)      DVT (deep venous thrombosis) ([facility] [address] (12/23/2021)        Hospital Course:    Tio Hernandez is a 49-year-old male with a PMH significant for intellectual disabilities, seizure disorder, quadriplegia who lives in a group home who presented with a 4 day history of anorexia, constipation s/p enema, and vomiting. In emergency room he was hypertensive, febrile, tachycardic and altered from baseline. Chest x-ray consistent with aspiration pneumonia. CT abd/pelvis Admitted for further management of sepsis, aspiration pneumonia, respiratory failure with hypoxia, and breakthrough seizure activity. Blood pressure decreasing required ICU admission and septic shock and started on Levophed. Started on vancomycin and Zosyn for aspiration pneumonia. Required nasal cannula oxygen for acute hypoxic respiratory failure. Weaned off pressor therapy and transferred to medical floor for further management. Doppler study positive for left external iliac vein thrombosis, left common femoral vein thrombosis and left proximal femoral vein thrombosis. He was started on Eliquis. Decreased antibiotic to monotherapy IV Zosyn. Started on Ketotifen and prednisone eyedrop for left sclera edema. Due to high risk of aspiration secondary to chronic disabilities and anatomical swallowing weakness, inability to maintain hydration and nutrition status he would benefit from feeding tube placement. GI has been consulted. PEG tube placed on 12/27/21. Heparin drip for bridge therapy while holding Eliquis. On the night of 12/28/2021 patient began vomiting tube feeds and was tachycardic / tachypneic. Concern for sepsis secondary aspiration pneumonia. Started back on IV vancomycin and Zosyn. Repeat chest x-ray showed no developing consolidation but does show persistent postinflammatory or related to prior aspiration reticular nodule pattern. Patient did not have a white count or elevated inflammatory markers. Patient was initially in the 80's saturating but is now normal. Vancomycin and Zosyn discontinued and started on Augmentin for 14 doses. Decreasing phenytoin level from prior study during hospitalization. Patient had breakthrough seizure Dr. Linda Linda advised to increase Dilantin dosage to 130 mg twice daily. Unable to act with patient's neurologist in Dr. Vertis Landau who can be called at [phone]. Patient to be placed on 1/10 to group home. UA pending. COVID negative 1/4. CXR 1/5 c/w small right sided pleural effusion. Lasix 20mg given x1. 1/6 CXR with unchanged micronodular disease left greater than right. Will discontinue Augmentin after today's dose and monitor off antibiotics. UA + protein and 20-50 RBC.     Acute hypoxic respiratory failure secondary to aspiration pneumonia  S/p zosyn x10 days and s/p Augmentin x 7 days   12/18 blood: negative, final  12/29 blood: negative, final  UA + protein and 20-50 RBC  Aspiration precautions  ID following    Seizure disorder, breakthrough seizure  Continue with dilantin, keppra, and lamictal   Neurology signed off    Intellectual disability / quadriplegia  History of dysphagia  PEG tube placed on 12/27    Deep venous thrombosis  Venous doppler positive for DVT  Continue on Eliquis    Hypertension  Continue on midodrine 5 mg twice daily    Left sclera edema  Continue on ketotifen and prednisolone drops    DVT Prophylaxis: eliquis  GI Prophylaxis: protonix  Discharge and disposition barriers: medically clear waiting on 1/10 group home transfer    Spoke with [place] mother    Care Plan discussed with: Patient/Family, Nurse and     Total time spent with patient: 35 minutes.

## 2022-01-08 LAB
ALBUMIN SERPL-MCNC: 2.2 G/DL (ref 3.5–5)
ALBUMIN/GLOB SERPL: 0.4 {RATIO} (ref 1.1–2.2)
ALP SERPL-CCNC: 160 U/L (ref 45–117)
ALT SERPL-CCNC: 36 U/L (ref 12–78)
ANION GAP SERPL CALC-SCNC: 5 MMOL/L (ref 5–15)
AST SERPL W P-5'-P-CCNC: 32 U/L (ref 15–37)
BASOPHILS # BLD: 0 K/UL (ref 0–0.1)
BASOPHILS NFR BLD: 0 % (ref 0–1)
BILIRUB SERPL-MCNC: 0.1 MG/DL (ref 0.2–1)
BUN SERPL-MCNC: 10 MG/DL (ref 6–20)
BUN/CREAT SERPL: 19 (ref 12–20)
CA-I BLD-MCNC: 9 MG/DL (ref 8.5–10.1)
CHLORIDE SERPL-SCNC: 101 MMOL/L (ref 97–108)
CO2 SERPL-SCNC: 27 MMOL/L (ref 21–32)
CREAT SERPL-MCNC: 0.54 MG/DL (ref 0.7–1.3)
DIFFERENTIAL METHOD BLD: ABNORMAL
EOSINOPHIL # BLD: 0.3 K/UL (ref 0–0.4)
EOSINOPHIL NFR BLD: 3 % (ref 0–7)
ERYTHROCYTE [DISTWIDTH] IN BLOOD BY AUTOMATED COUNT: 13.2 % (ref 11.5–14.5)
GLOBULIN SER CALC-MCNC: 5.3 G/DL (ref 2–4)
GLUCOSE SERPL-MCNC: 115 MG/DL (ref 65–100)
HCT VFR BLD AUTO: 31.5 % (ref 36.6–50.3)
HGB BLD-MCNC: 10.1 G/DL (ref 12.1–17)
IMM GRANULOCYTES # BLD AUTO: 0 K/UL (ref 0–0.04)
IMM GRANULOCYTES NFR BLD AUTO: 0 % (ref 0–0.5)
LYMPHOCYTES # BLD: 1.8 K/UL (ref 0.8–3.5)
LYMPHOCYTES NFR BLD: 19 % (ref 12–49)
MCH RBC QN AUTO: 29.8 PG (ref 26–34)
MCHC RBC AUTO-ENTMCNC: 32.1 G/DL (ref 30–36.5)
MCV RBC AUTO: 92.9 FL (ref 80–99)
MONOCYTES # BLD: 1 K/UL (ref 0–1)
MONOCYTES NFR BLD: 10 % (ref 5–13)
NEUTS SEG # BLD: 6.3 K/UL (ref 1.8–8)
NEUTS SEG NFR BLD: 68 % (ref 32–75)
NRBC # BLD: 0 K/UL (ref 0–0.01)
NRBC BLD-RTO: 0 PER 100 WBC
PLATELET # BLD AUTO: 404 K/UL (ref 150–400)
PMV BLD AUTO: 9 FL (ref 8.9–12.9)
POTASSIUM SERPL-SCNC: 3.8 MMOL/L (ref 3.5–5.1)
PROT SERPL-MCNC: 7.5 G/DL (ref 6.4–8.2)
RBC # BLD AUTO: 3.39 M/UL (ref 4.1–5.7)
SODIUM SERPL-SCNC: 133 MMOL/L (ref 136–145)
WBC # BLD AUTO: 9.4 K/UL (ref 4.1–11.1)

## 2022-01-08 PROCEDURE — 36415 COLL VENOUS BLD VENIPUNCTURE: CPT

## 2022-01-08 PROCEDURE — 74011250637 HC RX REV CODE- 250/637: Performed by: HOSPITALIST

## 2022-01-08 PROCEDURE — 74011250637 HC RX REV CODE- 250/637: Performed by: NURSE PRACTITIONER

## 2022-01-08 PROCEDURE — 74011250636 HC RX REV CODE- 250/636: Performed by: STUDENT IN AN ORGANIZED HEALTH CARE EDUCATION/TRAINING PROGRAM

## 2022-01-08 PROCEDURE — 74011250637 HC RX REV CODE- 250/637: Performed by: STUDENT IN AN ORGANIZED HEALTH CARE EDUCATION/TRAINING PROGRAM

## 2022-01-08 PROCEDURE — 65270000029 HC RM PRIVATE

## 2022-01-08 PROCEDURE — 85025 COMPLETE CBC W/AUTO DIFF WBC: CPT

## 2022-01-08 PROCEDURE — 80053 COMPREHEN METABOLIC PANEL: CPT

## 2022-01-08 PROCEDURE — 97110 THERAPEUTIC EXERCISES: CPT

## 2022-01-08 RX ORDER — SODIUM CHLORIDE 9 MG/ML
50 INJECTION, SOLUTION INTRAVENOUS ONCE
Status: COMPLETED | OUTPATIENT
Start: 2022-01-08 | End: 2022-01-09

## 2022-01-08 RX ADMIN — APIXABAN 5 MG: 5 TABLET, FILM COATED ORAL at 22:26

## 2022-01-08 RX ADMIN — KETOTIFEN FUMARATE 1 DROP: 0.35 SOLUTION/ DROPS OPHTHALMIC at 09:34

## 2022-01-08 RX ADMIN — MIDODRINE HYDROCHLORIDE 5 MG: 5 TABLET ORAL at 09:33

## 2022-01-08 RX ADMIN — PHENYTOIN 130 MG: 125 SUSPENSION ORAL at 22:25

## 2022-01-08 RX ADMIN — PREDNISOLONE ACETATE 1 DROP: 10 SUSPENSION/ DROPS OPHTHALMIC at 09:34

## 2022-01-08 RX ADMIN — LEVETIRACETAM 1000 MG: 500 SOLUTION ORAL at 09:33

## 2022-01-08 RX ADMIN — CETIRIZINE HYDROCHLORIDE 10 MG: 10 TABLET, FILM COATED ORAL at 09:33

## 2022-01-08 RX ADMIN — LAMOTRIGINE 300 MG: 100 TABLET ORAL at 22:26

## 2022-01-08 RX ADMIN — LEVETIRACETAM 1000 MG: 500 SOLUTION ORAL at 22:25

## 2022-01-08 RX ADMIN — LAMOTRIGINE 300 MG: 100 TABLET ORAL at 09:33

## 2022-01-08 RX ADMIN — MIDODRINE HYDROCHLORIDE 5 MG: 5 TABLET ORAL at 17:00

## 2022-01-08 RX ADMIN — PSYLLIUM HUSK 1 PACKET: 3.4 POWDER ORAL at 09:33

## 2022-01-08 RX ADMIN — PANTOPRAZOLE SODIUM 40 MG: 40 GRANULE, DELAYED RELEASE ORAL at 09:33

## 2022-01-08 RX ADMIN — LACTULOSE 15 ML: 10 SOLUTION ORAL at 09:33

## 2022-01-08 RX ADMIN — PANTOPRAZOLE SODIUM 40 MG: 40 GRANULE, DELAYED RELEASE ORAL at 22:26

## 2022-01-08 RX ADMIN — MIDODRINE HYDROCHLORIDE 5 MG: 5 TABLET ORAL at 11:04

## 2022-01-08 RX ADMIN — KETOTIFEN FUMARATE 1 DROP: 0.35 SOLUTION/ DROPS OPHTHALMIC at 22:25

## 2022-01-08 RX ADMIN — APIXABAN 5 MG: 5 TABLET, FILM COATED ORAL at 09:33

## 2022-01-08 RX ADMIN — PHENYTOIN 130 MG: 125 SUSPENSION ORAL at 09:33

## 2022-01-08 RX ADMIN — SODIUM CHLORIDE 50 ML/HR: 9 INJECTION, SOLUTION INTRAVENOUS at 12:00

## 2022-01-08 RX ADMIN — PREDNISOLONE ACETATE 1 DROP: 10 SUSPENSION/ DROPS OPHTHALMIC at 22:25

## 2022-01-08 RX ADMIN — FOLIC ACID 1 MG: 1 TABLET ORAL at 09:33

## 2022-01-08 NOTE — PROGRESS NOTES
Problem: Mobility Impaired (Adult and Pediatric)  Goal: *Acute Goals and Plan of Care (Insert Text)  Description: Patient will be seen for passive ROM to LE to prevent and restore the rom at LES while in hosp. 1-2 x week  within 7 day(s). Outcome: Progressing Towards Goal   PHYSICAL THERAPY TREATMENT  Patient: Manolo Morrell (13 y.o. male)  Date: 1/8/2022  Diagnosis: Aspiration pneumonia ([facility] [address] [J69.0] <principal problem not specified>  Procedure(s) (LRB):  ESOPHAGOGASTRODUODENOSCOPY (EGD) (N/A)  PERCUTANEOUS ENDOSCOPIC GASTROSTOMY TUBE INSERTION (N/A) 12 Days Post-Op  Precautions:    Chart, physical therapy assessment, plan of care and goals were reviewed. ASSESSMENT  Patient continues with skilled PT services and is progressing towards goals. Pt seen this tx for supine therex. Occ command following noted. Mostly PROM, with occ AAROM. Towards end of tx, pt became more resistive. Tolerated stretches/exercises fair with notable discomfort with B knee extension. Current Level of Function Impacting Discharge (mobility/balance): Limited mobility available. Other factors to consider for discharge: Limited at baseline. PLAN :  Patient continues to benefit from skilled intervention to address the above impairments. Continue treatment per established plan of care. to address goals. Recommendation for discharge: (in order for the patient to meet his/her long term goals)  Home with [id number] [facility]  Pt returning to group home 1/10/2022         SUBJECTIVE:   Nonverbal.     OBJECTIVE DATA SUMMARY:   Critical Behavior:  Neurologic State: Eyes open spontaneously  Orientation Level: Unable to verbalize  Cognition: Decreased attention/concentration,Decreased command following     Functional Mobility Training:  Bed Mobility:   Not performed this tx. Therapeutic Exercises:   AP x10  Knee extension x10  Hip flex x10  Hip abd x10  PROM, occ limited AAROM. Became resistive towards end. Pain Rating:  Unable to verbalize. Occ facial grimace noted with knee extension. Activity Tolerance:   Fair      After treatment patient left in no apparent distress:   Supine in bed, Heels elevated for pressure relief, Call bell within reach, Bed / chair alarm activated, and Side rails x 3    COMMUNICATION/COLLABORATION:   The patients plan of care was discussed with: Physical therapist and Registered nurse.      Lynn Pain   Time Calculation: 15 mins

## 2022-01-08 NOTE — PROGRESS NOTES
Hospitalist Progress Note    Subjective:   Daily Progress Note: 1/8/2022 10:20 AM    Patient is resting comfortably. Non verbal at baseline. Patient is afebrile, mildly tachycardic at 105 and hypotensive at 95/55. Labs pending.      Current Facility-Administered Medications   Medication Dose Route Frequency    levETIRAcetam (KEPPRA) oral solution 1,000 mg  1,000 mg Oral BID    pantoprazole (PROTONIX) granules for oral suspension 40 mg  40 mg Per G Tube BID    LORazepam (ATIVAN) injection 2 mg  2 mg IntraVENous Q4H PRN    LORazepam (ATIVAN) injection 2 mg  2 mg IntraVENous Q5MIN PRN    phenytoin (DILANTIN) 100 mg/4 mL oral suspension 130 mg  130 mg Oral DAILY    midodrine (PROAMATINE) tablet 5 mg  5 mg Oral TID WITH MEALS    prednisoLONE acetate (PRED FORTE) 1 % ophthalmic suspension 1 Drop  1 Drop Left Eye BID    apixaban (ELIQUIS) tablet 5 mg  5 mg Oral BID    acetaminophen (TYLENOL) suppository 650 mg  650 mg Rectal Q4H PRN    ondansetron (ZOFRAN) injection 4 mg  4 mg IntraVENous Q6H PRN    phenytoin (DILANTIN) 100 mg/4 mL oral suspension 130 mg  130 mg Oral QHS    cetirizine (ZYRTEC) tablet 10 mg  10 mg Oral DAILY    folic acid (FOLVITE) tablet 1 mg  1 mg Oral DAILY    ketotifen (ZADITOR) 0.025 % (0.035 %) ophthalmic solution 1 Drop  1 Drop Both Eyes BID    lactulose (CHRONULAC) 10 gram/15 mL solution 15 mL  10 g Oral DAILY    lamoTRIgine (LaMICtal) tablet 300 mg  300 mg Oral BID    psyllium husk-aspartame (METAMUCIL FIBER) packet 1 Packet  1 Packet Oral DAILY    acetaminophen (TYLENOL) tablet 650 mg  650 mg Oral Q4H PRN    albuterol (PROVENTIL HFA, VENTOLIN HFA, PROAIR HFA) inhaler 2 Puff  2 Puff Inhalation Q4H PRN        Review of Systems  Review of Systems   Unable to perform ROS: Acuity of condition            Objective:     Visit Vitals  BP (!) 95/55   Pulse (!) 107   Temp 98.9 °F (37.2 °C)   Resp 17   Ht 5' 6\" (1.676 m)   Wt 72.8 kg (160 lb 7.9 oz)   SpO2 93%   BMI 25.90 kg/m²    O2 Flow Rate (L/min): 2 l/min O2 Device: None (Room air)    Temp (24hrs), Av.7 °F (37.1 °C), Min:98.1 °F (36.7 °C), Max:99.1 °F (37.3 °C)      No intake/output data recorded.  1901 -  0700  In: 1365   Out: 1400 [Urine:1400]    No results found for this or any previous visit (from the past 24 hour(s)). XR CHEST PORT   Final Result      XR CHEST PORT   Final Result      XR CHEST PORT   Final Result      Single portable AP view compared to 2021. Generator pack on the   left with electrode extending to the left side of the neck. Suboptimal   inspiratory film compromises visualization of the lower lung fields. Heart size normal. No mediastinal widening or shift. Reticular nodular pattern in the lungs persists and may be postinflammatory or   related to prior aspiration. No developing consolidation. Negative for pulmonary   edema, pleural effusion or pneumothorax. No acute fractures. Negative for subdiaphragmatic free air. XR CHEST PORT   Final Result   1. Nasogastric tube tip overlies the right upper quadrant, likely the gastric   antrum or duodenal bulb. 2. Persistent reticulonodular opacities in both lungs. CTA CHEST W OR W WO CONT   Final Result   1. Extensive bilateral reticulonodular infiltrates throughout both lungs similar   to the previous study although both lungs were not included in their entirety on   the previous study. Findings may represent infectious, inflammatory or   neoplastic process. Recommend follow-up to. No evidence of pulmonary embolus,   aortic aneurysm or aortic dissection. 3. Borderline enlarged gastrohepatic lymph node. 4. Thickened esophagus, recommend clinical evaluation. DUPLEX LOWER EXT VENOUS BILAT   Final Result      XR CHEST PORT   Final Result   No significant interval change. CT ABD PELV W CONT   Final Result      1. Small focus of intraluminal gas within the urinary bladder.  Please correlate   for recent instrumentation or other causes of intraluminal gas, including an   infectious process. 2. Somewhat reticular nodular opacities in the lung bases, suboptimally   evaluated due to respiratory motion. Some of these have what appears to be a   tree and bud morphology, suggesting either chronic aspiration or an infectious   or inflammatory small airways disease. Otherwise a fibrotic process should be   considered. This would be better characterized with dedicated chest CT as   clinically warranted. 3. Underdistention versus circumferential wall thickening of the distal   esophagus. 4. Probable left trochanteric bursitis with sterility of the fluid   indeterminate. 5. Probable myositis ossificans surrounding the left hip. Please correlate for   history of prior trauma. 6. Enlarged lymph nodes in the upper abdomen, nonspecific. Comparison with   outside imaging would be helpful, if available, to determine longer term   stability. Otherwise short interval follow-up in 3 months is recommended. XR CHEST SNGL V   Final Result   Patchy opacities within the lungs, nonspecific, but leading differential   considerations include infection or pulmonary edema. Superimposed acute disease   on an underlying chronic interstitial process is also possible. Radiographic   follow-up is recommended to exclude other causes of opacification. XR ABD (KUB)   Final Result   Mildly prominent gas-filled loops of bowel within the central abdomen with   overall nonobstructive bowel gas pattern. PHYSICAL EXAM:    Physical Exam  Vitals and nursing note reviewed. Constitutional:       Comments: More interactive with provider today   HENT:      Head: Normocephalic and atraumatic. Eyes:      General:         Right eye: No discharge. Left eye: No discharge. Conjunctiva/sclera: Conjunctivae normal.   Cardiovascular:      Rate and Rhythm: Normal rate and regular rhythm. Pulmonary:      Effort: No respiratory distress. Breath sounds: No wheezing. Abdominal:      General: Bowel sounds are normal. There is no distension. Tenderness: There is no abdominal tenderness. Comments: PEG tube in place   Genitourinary:     Comments: Weinberg present  Musculoskeletal:         General: No tenderness. Right lower leg: No edema. Left lower leg: No edema. Comments: Upper extremities:  Left: adducted and flexed against chest   Right: abducted and flexed against chest    Lower extremities: bilaterally internally rotated and flexed   Skin:     General: Skin is warm. Capillary Refill: Capillary refill takes less than 2 seconds. Neurological:      Mental Status: He is alert. He is disoriented. Comments: Nonverbal at baseline   Psychiatric:      Comments: Unable to assess due to mental acuity        Data Review    No results found for this or any previous visit (from the past 24 hour(s)). Assessment/Plan:     Active Problems:    Sepsis ([address] (12/17/2021)      Aspiration pneumonia ([address] (12/17/2021)      Respiratory failure with hypoxia ([address] (12/21/2021)      DVT (deep venous thrombosis) ([address] (12/23/2021)        Hospital Course:    Aramis Smith is a 75-year-old male with a PMH significant for intellectual disabilities, seizure disorder, quadriplegia who lives in a group home who presented with a 4 day history of anorexia, constipation s/p enema, and vomiting. In emergency room he was hypertensive, febrile, tachycardic and altered from baseline. Chest x-ray consistent with aspiration pneumonia. CT abd/pelvis Admitted for further management of sepsis, aspiration pneumonia, respiratory failure with hypoxia, and breakthrough seizure activity. Blood pressure decreasing required ICU admission and septic shock and started on Levophed. Started on vancomycin and Zosyn for aspiration pneumonia. Required nasal cannula oxygen for acute hypoxic respiratory failure.  Weaned off pressor therapy and transferred to medical floor for further management. Doppler study positive for left external iliac vein thrombosis, left common femoral vein thrombosis and left proximal femoral vein thrombosis. He was started on Eliquis. Decreased antibiotic to monotherapy IV Zosyn. Started on Ketotifen and prednisone eyedrop for left sclera edema. Due to high risk of aspiration secondary to chronic disabilities and anatomical swallowing weakness, inability to maintain hydration and nutrition status he would benefit from feeding tube placement. GI has been consulted. PEG tube placed on 12/27/21. Heparin drip for bridge therapy while holding Eliquis. On the night of 12/28/2021 patient began vomiting tube feeds and was tachycardic / tachypneic. Concern for sepsis secondary aspiration pneumonia. Started back on IV vancomycin and Zosyn. Repeat chest x-ray showed no developing consolidation but does show persistent postinflammatory or related to prior aspiration reticular nodule pattern. Patient did not have a white count or elevated inflammatory markers. Patient was initially in the 80's saturating but is now normal. Vancomycin and Zosyn discontinued and started on Augmentin for 14 doses. Decreasing phenytoin level from prior study during hospitalization. Patient had breakthrough seizure Dr. Quitman Lennox advised to increase Dilantin dosage to 130 mg twice daily. Unable to act with patient's neurologist in Dr. Ismael Alexander who can be called at [phone]. Patient to be placed on 1/10 to group home. UA pending. COVID negative 1/4. CXR 1/5 c/w small right sided pleural effusion. Lasix 20mg given x1. 1/6 CXR with unchanged micronodular disease left greater than right. Will discontinue Augmentin after today's dose and monitor off antibiotics. UA + protein and 20-50 RBC.     Acute hypoxic respiratory failure secondary to aspiration pneumonia  S/p zosyn x10 days and s/p Augmentin x 7 days   12/18 blood: negative, final  12/29 blood: negative, final  UA + protein and 20-50 RBC  Aspiration precautions  ID following    Seizure disorder, breakthrough seizure  Continue with dilantin, keppra, and lamictal   Neurology signed off    Intellectual disability / quadriplegia  History of dysphagia  PEG tube placed on 12/27    Deep venous thrombosis  Venous doppler positive for DVT  Continue on Eliquis    Hypertension  Continue on midodrine 5 mg twice daily    Left sclera edema  Continue on ketotifen and prednisolone drops    DVT Prophylaxis: eliquis  GI Prophylaxis: protonix  Discharge and disposition barriers: medically clear waiting on 1/10 group home transfer    Spoke with Haydee Perkins, mother    Care Plan discussed with: Patient/Family, Nurse and     Total time spent with patient: 35 minutes.

## 2022-01-09 PROCEDURE — 65270000029 HC RM PRIVATE

## 2022-01-09 PROCEDURE — 74011250637 HC RX REV CODE- 250/637: Performed by: STUDENT IN AN ORGANIZED HEALTH CARE EDUCATION/TRAINING PROGRAM

## 2022-01-09 PROCEDURE — 74011250637 HC RX REV CODE- 250/637: Performed by: HOSPITALIST

## 2022-01-09 RX ORDER — ALBUTEROL SULFATE 90 UG/1
2 AEROSOL, METERED RESPIRATORY (INHALATION)
Qty: 18 G | Refills: 0 | Status: SHIPPED | OUTPATIENT
Start: 2022-01-09

## 2022-01-09 RX ORDER — PHENYTOIN 125 MG/5ML
230 SUSPENSION ORAL
Qty: 300 ML | Refills: 0 | Status: SHIPPED | OUTPATIENT
Start: 2022-01-09

## 2022-01-09 RX ORDER — PREDNISOLONE ACETATE 10 MG/ML
1 SUSPENSION/ DROPS OPHTHALMIC 2 TIMES DAILY
Qty: 5 ML | Refills: 0 | Status: SHIPPED | OUTPATIENT
Start: 2022-01-09

## 2022-01-09 RX ORDER — MIDODRINE HYDROCHLORIDE 5 MG/1
10 TABLET ORAL
Status: DISCONTINUED | OUTPATIENT
Start: 2022-01-09 | End: 2022-01-10 | Stop reason: HOSPADM

## 2022-01-09 RX ORDER — MIDODRINE HYDROCHLORIDE 10 MG/1
10 TABLET ORAL
Qty: 90 TABLET | Refills: 0 | Status: SHIPPED | OUTPATIENT
Start: 2022-01-09 | End: 2022-02-08

## 2022-01-09 RX ADMIN — MIDODRINE HYDROCHLORIDE 10 MG: 5 TABLET ORAL at 20:01

## 2022-01-09 RX ADMIN — MIDODRINE HYDROCHLORIDE 10 MG: 5 TABLET ORAL at 11:00

## 2022-01-09 RX ADMIN — PHENYTOIN 130 MG: 125 SUSPENSION ORAL at 10:48

## 2022-01-09 RX ADMIN — LACTULOSE 15 ML: 10 SOLUTION ORAL at 10:47

## 2022-01-09 RX ADMIN — LAMOTRIGINE 300 MG: 100 TABLET ORAL at 20:01

## 2022-01-09 RX ADMIN — CETIRIZINE HYDROCHLORIDE 10 MG: 10 TABLET, FILM COATED ORAL at 11:00

## 2022-01-09 RX ADMIN — KETOTIFEN FUMARATE 1 DROP: 0.35 SOLUTION/ DROPS OPHTHALMIC at 11:21

## 2022-01-09 RX ADMIN — LEVETIRACETAM 1000 MG: 500 SOLUTION ORAL at 20:01

## 2022-01-09 RX ADMIN — KETOTIFEN FUMARATE 1 DROP: 0.35 SOLUTION/ DROPS OPHTHALMIC at 20:02

## 2022-01-09 RX ADMIN — PANTOPRAZOLE SODIUM 40 MG: 40 GRANULE, DELAYED RELEASE ORAL at 20:01

## 2022-01-09 RX ADMIN — PSYLLIUM HUSK 1 PACKET: 3.4 POWDER ORAL at 10:49

## 2022-01-09 RX ADMIN — PREDNISOLONE ACETATE 1 DROP: 10 SUSPENSION/ DROPS OPHTHALMIC at 20:02

## 2022-01-09 RX ADMIN — APIXABAN 5 MG: 5 TABLET, FILM COATED ORAL at 10:49

## 2022-01-09 RX ADMIN — LAMOTRIGINE 300 MG: 100 TABLET ORAL at 10:49

## 2022-01-09 RX ADMIN — FOLIC ACID 1 MG: 1 TABLET ORAL at 10:49

## 2022-01-09 RX ADMIN — APIXABAN 5 MG: 5 TABLET, FILM COATED ORAL at 20:01

## 2022-01-09 RX ADMIN — PANTOPRAZOLE SODIUM 40 MG: 40 GRANULE, DELAYED RELEASE ORAL at 10:49

## 2022-01-09 RX ADMIN — PHENYTOIN 130 MG: 125 SUSPENSION ORAL at 20:01

## 2022-01-09 RX ADMIN — LEVETIRACETAM 1000 MG: 500 SOLUTION ORAL at 10:48

## 2022-01-09 RX ADMIN — PREDNISOLONE ACETATE 1 DROP: 10 SUSPENSION/ DROPS OPHTHALMIC at 11:21

## 2022-01-09 NOTE — DISCHARGE SUMMARY
Admit date: 12/17/2021   Admitting Provider: Curtis Martinez MD    Discharge date: 1/9/2022  Discharging Provider: MARCO A Dalton      * Admission Diagnoses: Aspiration pneumonia [facility]) [J69.0]    * Discharge Diagnoses:    Hospital Problems as of 1/9/2022 Date Reviewed: 12/17/2021          Codes Class Noted - Resolved POA    DVT (deep venous thrombosis) ([facility] ICD-10-CM: I82.409  ICD-9-CM: 453.40  12/23/2021 - Present Unknown        Respiratory failure with hypoxia ([facility] ICD-10-CM: J96.91  ICD-9-CM: 518.81  12/21/2021 - Present Unknown        Sepsis ([facility] ICD-10-CM: A41.9  ICD-9-CM: 038.9, 995.91  12/17/2021 - Present Yes        Aspiration pneumonia [facility]) ICD-10-CM: J69.0  ICD-9-CM: 507.0  12/17/2021 - Present Unknown              * Hospital Course:     Caroline Vega is a 59-year-old male with a PMH significant for intellectual disabilities, seizure disorder, and quadriplegia who lives in a group home who presented with a 4 day history of anorexia, constipation s/p enema, and vomiting. In the emergency room he was hypertensive, febrile, tachycardic and altered from baseline. Chest x-ray consistent with aspiration pneumonia. CT abd/pelvis Admitted for further management of sepsis, aspiration pneumonia, respiratory failure with hypoxia, and breakthrough seizure activity. Blood pressure decreasing required ICU admission and septic shock and started on Levophed. Started on vancomycin and Zosyn for aspiration pneumonia. Required nasal cannula oxygen for acute hypoxic respiratory failure. Weaned off pressor therapy and transferred to medical floor for further management. Doppler study positive for left external iliac vein thrombosis, left common femoral vein thrombosis and left proximal femoral vein thrombosis. He was started on Eliquis. Decreased antibiotic to monotherapy IV Zosyn. Started on Ketotifen and prednisone eyedrop for left sclera edema.  Due to high risk of aspiration secondary to chronic disabilities and anatomical swallowing weakness, inability to maintain hydration and nutrition status he would benefit from feeding tube placement. GI has been consulted. PEG tube placed on 12/27/21. Heparin drip for bridge therapy while holding Eliquis. On the night of 12/28/21 patient began vomiting tube feeds, tachycardic, and tachypneic. Concern for sepsis secondary aspiration pneumonia so was started back on IV vancomycin and Zosyn. Repeat chest x-ray showed no developing consolidation but does show persistent postinflammatory or related to prior aspiration reticular nodule pattern. Patient did not have a white count or elevated inflammatory markers. Patient was initially in the 80's saturating but is now normal. Vancomycin and Zosyn discontinued and started on Augmentin for 14 doses. Decreasing phenytoin level from prior study during hospitalization. Patient had breakthrough seizure Dr. Chaitanya Aceves advised to increase Dilantin dosage to 130 mg twice daily. Unable to act with patient's neurologist in Dr. Dara Mcclelland who can be reached at [phone]. UA + protein and 20-50 RBC. COVID negative 1/4. CXR 1/5 c/w small right sided pleural effusion. Lasix 20mg given x1. 1/6 CXR with unchanged micronodular disease left greater than right. Will discontinue Augmentin after today's dose and monitor off antibiotics. Due to persistently low BP will increase midodrine to 10mg TID. Patient medically clear for discharge to group home scheduled on 1/10. Patient's mother does not have pharmacy or PCP set up due to new group home selection but says to contact Sara Fajardo RN from the group home for further clarification of this. Will have printed scripts for now. Patient's mother is in agreement with current treatment and discharge plan.      * Procedures:   Procedure(s):  ESOPHAGOGASTRODUODENOSCOPY (EGD)  PERCUTANEOUS ENDOSCOPIC GASTROSTOMY TUBE INSERTION      Consults:   Infectious disease, neurology, gastroenterology, and hematology    Significant Diagnostic Studies: As discussed in hospital course    Discharge Exam:  Visit Vitals  BP (!) 91/48   Pulse (!) 101   Temp 98.8 °F (37.1 °C)   Resp 18   Ht 5' 6\" (1.676 m)   Wt 73 kg (160 lb 15 oz)   SpO2 92%   BMI 25.98 kg/m²       Physical Exam  Vitals and nursing note reviewed. Constitutional:       Comments: More interactive with provider today   HENT:      Head: Normocephalic and atraumatic. Eyes:      General:         Right eye: No discharge. Left eye: No discharge. Conjunctiva/sclera: Conjunctivae normal.   Cardiovascular:      Rate and Rhythm: Normal rate and regular rhythm. Pulmonary:      Effort: No respiratory distress. Breath sounds: No wheezing. Abdominal:      General: Bowel sounds are normal. There is no distension. Tenderness: There is no abdominal tenderness. Comments: PEG tube in place   Genitourinary:     Comments: Weinberg present  Musculoskeletal:         General: No tenderness. Right lower leg: No edema. Left lower leg: No edema. Comments: Upper extremities:  Left: adducted and flexed against chest   Right: abducted and flexed against chest  Lower extremities: bilaterally internally rotated and flexed   Skin:     General: Skin is warm. Capillary Refill: Capillary refill takes less than 2 seconds. Neurological:      Mental Status: He is alert. He is disoriented. Comments: Nonverbal at baseline   Psychiatric:      Comments: Unable to assess due to mental acuity       * Discharge Condition: improved  * Disposition: Group home    Discharge Medications:  Current Discharge Medication List      START taking these medications    Details   albuterol (PROVENTIL HFA, VENTOLIN HFA, PROAIR HFA) 90 mcg/actuation inhaler Take 2 Puffs by inhalation every four (4) hours as needed for Wheezing or Shortness of Breath.   Qty: 18 g, Refills: 0  Start date: 1/9/2022      apixaban (ELIQUIS) 5 mg tablet Take 1 Tablet by mouth two (2) times a day. Qty: 60 Tablet, Refills: 0  Start date: 1/9/2022      midodrine (PROAMATINE) 10 mg tablet Take 1 Tablet by mouth three (3) times daily (with meals) for 30 days. Qty: 90 Tablet, Refills: 0  Start date: 1/9/2022, End date: 2/8/2022      prednisoLONE acetate (PRED FORTE) 1 % ophthalmic suspension Administer 1 Drop to left eye two (2) times a day. Qty: 5 mL, Refills: 0  Start date: 1/9/2022      phenytoin (DILANTIN) 100 mg/4 mL susp oral suspension Take 9.2 mL by mouth nightly. 100mg AM + 130mg QHS = 230mg daily  Hazardous medications: Use proper procedures for handling and disposal of hazardous medications. Qty: 300 mL, Refills: 0  Start date: 1/9/2022         CONTINUE these medications which have NOT CHANGED    Details   cetirizine (ZYRTEC) 10 mg tablet Take 10 mg by mouth daily. phenytoin ER (Dilantin) 100 mg ER capsule Take 230 mg by mouth daily. Schedule at noon      folic acid (FOLVITE) 1 mg tablet Take 1 mg by mouth daily. ketotifen (ZADITOR) 0.025 % (0.035 %) ophthalmic solution Administer 1 Drop to both eyes two (2) times a day. lactulose (CHRONULAC) 10 gram/15 mL solution Take 10 g by mouth daily. lamoTRIgine (LaMICtal) 150 mg tablet Take 300 mg by mouth two (2) times a day. levETIRAcetam 1,000 mg tablet Take 1,000 mg by mouth two (2) times a day. psyllium husk-aspartame (Natural Fiber Supplemnt,asprt,) 3.4 gram pwpk packet Take 1 Packet by mouth daily. * Follow-up Care/Patient Instructions:   Activity: PT/OT Eval and Treat  Diet: PEG feeding Jevity 1.5 at 45mL/hr, 120mL free water q4hr  Wound Care: None needed    Follow-up Information     Follow up With Specialties Details Why [facility] 1/11/2022 [address]   [address]  [address]  [phone] [address] [facility] for [employer] supplies [facility], [address] [place] [address] [address], [address]  [phone]     Kris Laughlin MD Neurology Schedule an appointment as soon as possible for a visit in 2 weeks routine followup [address]  [address] [place] [id number] [phone]            Discharge summary greater than 35 minutes spent with the patient performing discharge instructions, medication review and physical exam    Signed:  MARCO A Santiago  1/9/2022  9:37 AM

## 2022-01-09 NOTE — PROGRESS NOTES
DC order noted. Chart reviewed. Pt is scheduled to return to his [address] [place] [address] on Monday. Please see  note of [id number] for details.   Delay entered

## 2022-01-09 NOTE — PROGRESS NOTES
Problem: Pressure Injury - Risk of  Goal: *Prevention of pressure injury  Description: Document Martin Scale and appropriate interventions in the flowsheet.   Outcome: Progressing Towards Goal  Note: Pressure Injury Interventions:  Sensory Interventions: Avoid rigorous massage over bony prominences,Keep linens dry and wrinkle-free,Minimize linen layers    Moisture Interventions: Absorbent underpads    Activity Interventions: Assess need for specialty bed    Mobility Interventions: Float heels,HOB 30 degrees or less,Pressure redistribution bed/mattress (bed type),PT/OT evaluation,Suspension boots    Nutrition Interventions: Document food/fluid/supplement intake    Friction and Shear Interventions: HOB 30 degrees or less                Problem: Patient Education: Go to Patient Education Activity  Goal: Patient/Family Education  Outcome: Progressing Towards Goal     Problem: Patient Education: Go to Patient Education Activity  Goal: Patient/Family Education  Outcome: Progressing Towards Goal

## 2022-01-10 VITALS
RESPIRATION RATE: 20 BRPM | DIASTOLIC BLOOD PRESSURE: 66 MMHG | SYSTOLIC BLOOD PRESSURE: 107 MMHG | HEART RATE: 100 BPM | WEIGHT: 160.94 LBS | TEMPERATURE: 99.5 F | HEIGHT: 66 IN | BODY MASS INDEX: 25.86 KG/M2 | OXYGEN SATURATION: 95 %

## 2022-01-10 LAB
ALBUMIN SERPL-MCNC: 2.5 G/DL (ref 3.5–5)
ALBUMIN/GLOB SERPL: 0.5 {RATIO} (ref 1.1–2.2)
ALP SERPL-CCNC: 178 U/L (ref 45–117)
ALT SERPL-CCNC: 38 U/L (ref 12–78)
ANION GAP SERPL CALC-SCNC: 5 MMOL/L (ref 5–15)
AST SERPL W P-5'-P-CCNC: 31 U/L (ref 15–37)
BASOPHILS # BLD: 0 K/UL (ref 0–0.1)
BASOPHILS NFR BLD: 0 % (ref 0–1)
BILIRUB SERPL-MCNC: 0.2 MG/DL (ref 0.2–1)
BUN SERPL-MCNC: 10 MG/DL (ref 6–20)
BUN/CREAT SERPL: 16 (ref 12–20)
CA-I BLD-MCNC: 9.2 MG/DL (ref 8.5–10.1)
CHLORIDE SERPL-SCNC: 100 MMOL/L (ref 97–108)
CO2 SERPL-SCNC: 28 MMOL/L (ref 21–32)
CREAT SERPL-MCNC: 0.62 MG/DL (ref 0.7–1.3)
DIFFERENTIAL METHOD BLD: ABNORMAL
EOSINOPHIL # BLD: 0.3 K/UL (ref 0–0.4)
EOSINOPHIL NFR BLD: 3 % (ref 0–7)
ERYTHROCYTE [DISTWIDTH] IN BLOOD BY AUTOMATED COUNT: 13.1 % (ref 11.5–14.5)
GLOBULIN SER CALC-MCNC: 4.7 G/DL (ref 2–4)
GLUCOSE SERPL-MCNC: 118 MG/DL (ref 65–100)
HCT VFR BLD AUTO: 33.8 % (ref 36.6–50.3)
HGB BLD-MCNC: 10.7 G/DL (ref 12.1–17)
IMM GRANULOCYTES # BLD AUTO: 0 K/UL (ref 0–0.04)
IMM GRANULOCYTES NFR BLD AUTO: 0 % (ref 0–0.5)
LYMPHOCYTES # BLD: 1.6 K/UL (ref 0.8–3.5)
LYMPHOCYTES NFR BLD: 18 % (ref 12–49)
MCH RBC QN AUTO: 29.8 PG (ref 26–34)
MCHC RBC AUTO-ENTMCNC: 31.7 G/DL (ref 30–36.5)
MCV RBC AUTO: 94.2 FL (ref 80–99)
MONOCYTES # BLD: 0.7 K/UL (ref 0–1)
MONOCYTES NFR BLD: 7 % (ref 5–13)
NEUTS SEG # BLD: 6.5 K/UL (ref 1.8–8)
NEUTS SEG NFR BLD: 72 % (ref 32–75)
NRBC # BLD: 0 K/UL (ref 0–0.01)
NRBC BLD-RTO: 0 PER 100 WBC
PLATELET # BLD AUTO: 435 K/UL (ref 150–400)
PMV BLD AUTO: 9.2 FL (ref 8.9–12.9)
POTASSIUM SERPL-SCNC: 4.1 MMOL/L (ref 3.5–5.1)
PROT SERPL-MCNC: 7.2 G/DL (ref 6.4–8.2)
RBC # BLD AUTO: 3.59 M/UL (ref 4.1–5.7)
SODIUM SERPL-SCNC: 133 MMOL/L (ref 136–145)
WBC # BLD AUTO: 9.2 K/UL (ref 4.1–11.1)

## 2022-01-10 PROCEDURE — 74011250637 HC RX REV CODE- 250/637: Performed by: HOSPITALIST

## 2022-01-10 PROCEDURE — 85025 COMPLETE CBC W/AUTO DIFF WBC: CPT

## 2022-01-10 PROCEDURE — 36415 COLL VENOUS BLD VENIPUNCTURE: CPT

## 2022-01-10 PROCEDURE — 74011250637 HC RX REV CODE- 250/637: Performed by: STUDENT IN AN ORGANIZED HEALTH CARE EDUCATION/TRAINING PROGRAM

## 2022-01-10 PROCEDURE — 80053 COMPREHEN METABOLIC PANEL: CPT

## 2022-01-10 RX ADMIN — PANTOPRAZOLE SODIUM 40 MG: 40 GRANULE, DELAYED RELEASE ORAL at 08:25

## 2022-01-10 RX ADMIN — LACTULOSE 15 ML: 10 SOLUTION ORAL at 08:24

## 2022-01-10 RX ADMIN — FOLIC ACID 1 MG: 1 TABLET ORAL at 08:25

## 2022-01-10 RX ADMIN — APIXABAN 5 MG: 5 TABLET, FILM COATED ORAL at 08:25

## 2022-01-10 RX ADMIN — PHENYTOIN 130 MG: 125 SUSPENSION ORAL at 08:24

## 2022-01-10 RX ADMIN — LAMOTRIGINE 300 MG: 100 TABLET ORAL at 08:25

## 2022-01-10 RX ADMIN — LEVETIRACETAM 1000 MG: 500 SOLUTION ORAL at 08:25

## 2022-01-10 RX ADMIN — MIDODRINE HYDROCHLORIDE 10 MG: 5 TABLET ORAL at 08:25

## 2022-01-10 RX ADMIN — PSYLLIUM HUSK 1 PACKET: 3.4 POWDER ORAL at 08:24

## 2022-01-10 RX ADMIN — CETIRIZINE HYDROCHLORIDE 10 MG: 10 TABLET, FILM COATED ORAL at 08:25

## 2022-01-10 NOTE — PROGRESS NOTES
Report called to Faiza Ritchie RN at the group home.  faxed all necessary paperwork to Faiza Ritchie.

## 2022-02-11 NOTE — ANESTHESIA POSTPROCEDURE EVALUATION
Procedure(s):  ESOPHAGOGASTRODUODENOSCOPY (EGD)  PERCUTANEOUS ENDOSCOPIC GASTROSTOMY TUBE INSERTION. total IV anesthesia, MAC    Anesthesia Post Evaluation      Multimodal analgesia: multimodal analgesia not used between 6 hours prior to anesthesia start to PACU discharge  Patient location during evaluation: bedside (Endoscopy suite)  Patient participation: complete - patient cannot participate  Level of consciousness: sleepy but conscious  Pain score: 0  Pain management: adequate  Airway patency: patent  Anesthetic complications: no  Cardiovascular status: acceptable  Respiratory status: acceptable and nasal cannula  Hydration status: acceptable  Comments: This patient remained on the stretcher. The patient was handed off to the endoscopy nursing team.  All questions regarding pre-, intra-, and postoperative care were answered.   Post anesthesia nausea and vomiting:  none      INITIAL Post-op Vital signs:   Vitals Value Taken Time   /66 01/10/22 0923   Temp 37.5 °C (99.5 °F) 01/10/22 0923   Pulse 100 01/10/22 0923   Resp 20 01/10/22 0923   SpO2 95 % 01/10/22 0923

## 2022-02-24 NOTE — PROGRESS NOTES
Progress Note    Patient: Idalmis Thakur MRN: [id number]  SSN: [id number]    YOB: 1975  Age: 55 y.o. Sex: male      Admit Date: 12/17/2021    LOS: 3 days     Subjective:     55 y.o. male with flexion contractures and bedridden status requiring total care but with oral intake with soft diet, intellectual developmental delay, seizure disorder brought to the emergency room by the caregivers from the group home as patient was not eating and drinking for 4 days. He did not also have any bowel movement, was given enema with not much success. He started having vomiting, several times but unable to get more information from them. He is found with a temperature on arrival to the emergency room and hypertension tachycardia meeting sepsis criteria. Chest x-ray with suspected aspiration pneumonia, CT of the abdomen pelvis done was showing some nonspecific abnormalities but no significant intra-abdominal pathology. To summarise: 46M, with acute hypoxic respiratory failure s.t aspiration pneumonia s.t possible seixure. Complicated by DVT in right DVT. Rule out PE    Patient was taking PO AC likelt eliquis but family doesnot know the name of the medication. Review of Systems:  Unable to deteremine s/t mental status      Objective:     Vitals:    12/20/21 0900 12/20/21 1000 12/20/21 1100 12/20/21 1200   BP: 106/75 101/79 105/77 103/88   Pulse: 80 80 86 87   Resp: 17 17 16 16   Temp:   98.7 °F (37.1 °C)    SpO2: 97% 97% 99% 100%   Weight:       Height:            Intake and Output:  Current Shift: 12/20 0701 - 12/20 1900  In: -   Out: 2000 [Urine:2000]  Last three shifts: 12/18 1901 - 12/20 0700  In: 2659.7 [I.V.:2659.7]  Out: 0348 [Urine:3450]      Physical Exam:   General : Open eyes to the commands but unable to stay awake. Well-built,  HEENT : PERRLA, dry oral mucosa, atraumatic normocephalic, Normal ear and nose. Neck : Supple, no JVD, no masses noted, no carotid bruit.   Lungs : Breath sounds SAADIA Menendez/Alessia BLANCO with moderate air entry bilaterally, rhonchi noted, not using accessory muscles to breathe. CVS : Rhythm rate regular, S1+, S2+, tachycardia noted. No murmur or gallop. Abdomen : Soft, nontender, no  bowel sounds active. Extremities : Upper extremities wrist flexion contractures, lower extremities difficult to move. .  Musculoskeletal : Increased muscle tone power with contracture abnormalities. Skin : Dry, poor skin turgor. no pathological rash. Lymphatic : No cervical lymphadenopathy. Neurological : Unable to evaluate   psychiatric :  unable to evaluate. Lab/Data Review:  Recent Results (from the past 24 hour(s))   GLUCOSE, POC    Collection Time: 12/19/21  4:52 PM   Result Value Ref Range    Glucose (POC) 126 (H) 65 - 117 mg/dL    Performed by Kadie Berman    GLUCOSE, POC    Collection Time: 12/19/21  9:33 PM   Result Value Ref Range    Glucose (POC) 86 65 - 117 mg/dL    Performed by Marc Chun Ute    CBC WITH AUTOMATED DIFF    Collection Time: 12/20/21  5:00 AM   Result Value Ref Range    WBC 6.6 4.1 - 11.1 K/uL    RBC 3.23 (L) 4.10 - 5.70 M/uL    HGB 10.0 (L) 12.1 - 17.0 g/dL    HCT 31.1 (L) 36.6 - 50.3 %    MCV 96.3 80.0 - 99.0 FL    MCH 31.0 26.0 - 34.0 PG    MCHC 32.2 30.0 - 36.5 g/dL    RDW 12.8 11.5 - 14.5 %    PLATELET 322 441 - 543 K/uL    MPV 9.6 8.9 - 12.9 FL    NRBC 0.0 0.0  WBC    ABSOLUTE NRBC 0.00 0.00 - 0.01 K/uL    NEUTROPHILS 59 32 - 75 %    LYMPHOCYTES 22 12 - 49 %    MONOCYTES 9 5 - 13 %    EOSINOPHILS 9 (H) 0 - 7 %    BASOPHILS 1 0 - 1 %    IMMATURE GRANULOCYTES 0 0 - 0.5 %    ABS. NEUTROPHILS 3.9 1.8 - 8.0 K/UL    ABS. LYMPHOCYTES 1.5 0.8 - 3.5 K/UL    ABS. MONOCYTES 0.6 0.0 - 1.0 K/UL    ABS. EOSINOPHILS 0.6 (H) 0.0 - 0.4 K/UL    ABS. BASOPHILS 0.0 0.0 - 0.1 K/UL    ABS. IMM.  GRANS. 0.0 0.00 - 0.04 K/UL    DF AUTOMATED     C REACTIVE PROTEIN, QT    Collection Time: 12/20/21  5:00 AM   Result Value Ref Range    C-Reactive protein 6.44 (H) 0.00 - 0.60 mg/dL   GLUCOSE, POC    Collection Time: 12/20/21  7:09 AM   Result Value Ref Range    Glucose (POC) 93 65 - 117 mg/dL    Performed by [address] [place] [address], POC    Collection Time: 12/20/21 11:15 AM   Result Value Ref Range    Glucose (POC) 86 65 - 117 mg/dL    Performed by Kelly Horn, RANDOM    Collection Time: 12/20/21 12:40 PM   Result Value Ref Range    Vancomycin, random 11.0 ug/mL    Reported dose date Blood      Reported dose: Blood Units   PHENYTOIN    Collection Time: 12/20/21 12:40 PM   Result Value Ref Range    Phenytoin 5.6 (L) 10 - 20 ug/mL    Reported dose date Blood      Reported dose: Blood Units         Assessment and plan:      (1) acute hypoxic respiratory failure: 3L NC. Wean to keep saturation     (2) septic shock: on levophed, weaned : IVF, vanc and zosyn    (3) Aspiration pneumonia : speech, follow speech recommendations    (4) seizure disorder: resume home dose, most liekly had seizure, another epoisode while inpatient,.     (5) intellectual disability and quadriplegia: complicates all aspects ofc are    (6) DVT and possible PE: eliquis      DISPO: will eventually go home once more alsert, off of oxygen and PE ruled out.      Spoke to mother    Signed By: Herb Benjamin MD     December 20, 2021

## 2022-03-18 PROBLEM — A41.9 SEPSIS (HCC): Status: ACTIVE | Noted: 2021-12-17

## 2022-03-19 PROBLEM — I82.409 DVT (DEEP VENOUS THROMBOSIS) (HCC): Status: ACTIVE | Noted: 2021-12-23

## 2022-03-19 PROBLEM — J69.0 ASPIRATION PNEUMONIA (HCC): Status: ACTIVE | Noted: 2021-12-17

## 2022-03-19 PROBLEM — J96.91 RESPIRATORY FAILURE WITH HYPOXIA (HCC): Status: ACTIVE | Noted: 2021-12-21

## (undated) DEVICE — YANKAUER,BULB TIP,W/O VENT,RIGID,STERILE: Brand: MEDLINE

## (undated) DEVICE — KIT GASTMY PERC PEG PULL 20FR -- ENDOVIVE BX/2

## (undated) DEVICE — CANNULA NSL O2 AD 7 FT END-TIDAL CARBON DIOX VENTFLO

## (undated) DEVICE — MASK 14X6.5MM O2 PVC ADLT ADPT -- 2 ENTRY PORT ELAS STRP NSE CL

## (undated) DEVICE — ENDO KIT 1: Brand: MEDLINE INDUSTRIES, INC.

## (undated) DEVICE — THE BITE BLOCK INTERMEDIATE, LATEX FREE STRAP IS USED TO PROTECT THE ENDOSCOPE INSERTION TUBE FROM BEING BITTEN BY THE PATIENT.